# Patient Record
Sex: FEMALE | Race: BLACK OR AFRICAN AMERICAN | NOT HISPANIC OR LATINO | Employment: OTHER | URBAN - METROPOLITAN AREA
[De-identification: names, ages, dates, MRNs, and addresses within clinical notes are randomized per-mention and may not be internally consistent; named-entity substitution may affect disease eponyms.]

---

## 2023-05-10 ENCOUNTER — TELEPHONE (OUTPATIENT)
Dept: FAMILY MEDICINE | Facility: CLINIC | Age: 63
End: 2023-05-10
Payer: COMMERCIAL

## 2023-05-11 ENCOUNTER — LAB VISIT (OUTPATIENT)
Dept: LAB | Facility: HOSPITAL | Age: 63
End: 2023-05-11
Attending: FAMILY MEDICINE
Payer: COMMERCIAL

## 2023-05-11 ENCOUNTER — OFFICE VISIT (OUTPATIENT)
Dept: FAMILY MEDICINE | Facility: CLINIC | Age: 63
End: 2023-05-11
Payer: COMMERCIAL

## 2023-05-11 VITALS
OXYGEN SATURATION: 95 % | SYSTOLIC BLOOD PRESSURE: 136 MMHG | TEMPERATURE: 98 F | DIASTOLIC BLOOD PRESSURE: 80 MMHG | RESPIRATION RATE: 18 BRPM | HEART RATE: 82 BPM | BODY MASS INDEX: 33.72 KG/M2 | HEIGHT: 65 IN | WEIGHT: 202.38 LBS

## 2023-05-11 DIAGNOSIS — Z00.00 WELL WOMAN EXAM WITHOUT GYNECOLOGICAL EXAM: ICD-10-CM

## 2023-05-11 DIAGNOSIS — Z12.11 ENCOUNTER FOR SCREENING COLONOSCOPY: ICD-10-CM

## 2023-05-11 DIAGNOSIS — Z01.419 WELL WOMAN EXAM WITH ROUTINE GYNECOLOGICAL EXAM: ICD-10-CM

## 2023-05-11 DIAGNOSIS — E11.9 NEW ONSET TYPE 2 DIABETES MELLITUS: ICD-10-CM

## 2023-05-11 DIAGNOSIS — Z12.31 SCREENING MAMMOGRAM FOR BREAST CANCER: ICD-10-CM

## 2023-05-11 DIAGNOSIS — Z00.00 WELL WOMAN EXAM WITHOUT GYNECOLOGICAL EXAM: Primary | ICD-10-CM

## 2023-05-11 DIAGNOSIS — Z23 NEED FOR TDAP VACCINATION: ICD-10-CM

## 2023-05-11 LAB
ALBUMIN SERPL BCP-MCNC: 2.9 G/DL (ref 3.5–5.2)
ALP SERPL-CCNC: 73 U/L (ref 55–135)
ALT SERPL W/O P-5'-P-CCNC: 17 U/L (ref 10–44)
ANION GAP SERPL CALC-SCNC: 6 MMOL/L (ref 8–16)
AST SERPL-CCNC: 21 U/L (ref 10–40)
BASOPHILS # BLD AUTO: 0.01 K/UL (ref 0–0.2)
BASOPHILS NFR BLD: 0.2 % (ref 0–1.9)
BILIRUB SERPL-MCNC: 0.5 MG/DL (ref 0.1–1)
BUN SERPL-MCNC: 18 MG/DL (ref 8–23)
CALCIUM SERPL-MCNC: 8.7 MG/DL (ref 8.7–10.5)
CHLORIDE SERPL-SCNC: 102 MMOL/L (ref 95–110)
CHOLEST SERPL-MCNC: 259 MG/DL (ref 120–199)
CHOLEST/HDLC SERPL: 2.7 {RATIO} (ref 2–5)
CO2 SERPL-SCNC: 28 MMOL/L (ref 23–29)
CREAT SERPL-MCNC: 0.9 MG/DL (ref 0.5–1.4)
DIFFERENTIAL METHOD: ABNORMAL
EOSINOPHIL # BLD AUTO: 0.1 K/UL (ref 0–0.5)
EOSINOPHIL NFR BLD: 2.3 % (ref 0–8)
ERYTHROCYTE [DISTWIDTH] IN BLOOD BY AUTOMATED COUNT: 12.6 % (ref 11.5–14.5)
EST. GFR  (NO RACE VARIABLE): >60 ML/MIN/1.73 M^2
ESTIMATED AVG GLUCOSE: 292 MG/DL (ref 68–131)
GLUCOSE SERPL-MCNC: 263 MG/DL (ref 70–110)
HBA1C MFR BLD: 11.8 % (ref 4–5.6)
HCT VFR BLD AUTO: 37.4 % (ref 37–48.5)
HCV AB SERPL QL IA: NORMAL
HDLC SERPL-MCNC: 95 MG/DL (ref 40–75)
HDLC SERPL: 36.7 % (ref 20–50)
HGB BLD-MCNC: 11.7 G/DL (ref 12–16)
IMM GRANULOCYTES # BLD AUTO: 0.01 K/UL (ref 0–0.04)
IMM GRANULOCYTES NFR BLD AUTO: 0.2 % (ref 0–0.5)
LDLC SERPL CALC-MCNC: 149 MG/DL (ref 63–159)
LYMPHOCYTES # BLD AUTO: 1.4 K/UL (ref 1–4.8)
LYMPHOCYTES NFR BLD: 27.3 % (ref 18–48)
MCH RBC QN AUTO: 29.8 PG (ref 27–31)
MCHC RBC AUTO-ENTMCNC: 31.3 G/DL (ref 32–36)
MCV RBC AUTO: 95 FL (ref 82–98)
MONOCYTES # BLD AUTO: 0.2 K/UL (ref 0.3–1)
MONOCYTES NFR BLD: 3.5 % (ref 4–15)
NEUTROPHILS # BLD AUTO: 3.4 K/UL (ref 1.8–7.7)
NEUTROPHILS NFR BLD: 66.5 % (ref 38–73)
NONHDLC SERPL-MCNC: 164 MG/DL
NRBC BLD-RTO: 0 /100 WBC
PLATELET # BLD AUTO: 128 K/UL (ref 150–450)
PMV BLD AUTO: 13.2 FL (ref 9.2–12.9)
POTASSIUM SERPL-SCNC: 4.3 MMOL/L (ref 3.5–5.1)
PROT SERPL-MCNC: 6.2 G/DL (ref 6–8.4)
RBC # BLD AUTO: 3.93 M/UL (ref 4–5.4)
SODIUM SERPL-SCNC: 136 MMOL/L (ref 136–145)
TRIGL SERPL-MCNC: 75 MG/DL (ref 30–150)
TSH SERPL DL<=0.005 MIU/L-ACNC: 1.19 UIU/ML (ref 0.4–4)
WBC # BLD AUTO: 5.13 K/UL (ref 3.9–12.7)

## 2023-05-11 PROCEDURE — 85025 COMPLETE CBC W/AUTO DIFF WBC: CPT | Performed by: FAMILY MEDICINE

## 2023-05-11 PROCEDURE — 90715 TDAP VACCINE 7 YRS/> IM: CPT | Mod: S$GLB,,, | Performed by: FAMILY MEDICINE

## 2023-05-11 PROCEDURE — 90715 TDAP VACCINE GREATER THAN OR EQUAL TO 7YO IM: ICD-10-PCS | Mod: S$GLB,,, | Performed by: FAMILY MEDICINE

## 2023-05-11 PROCEDURE — 99386 PREV VISIT NEW AGE 40-64: CPT | Mod: 25,S$GLB,, | Performed by: FAMILY MEDICINE

## 2023-05-11 PROCEDURE — 99999 PR PBB SHADOW E&M-EST. PATIENT-LVL V: CPT | Mod: PBBFAC,,, | Performed by: FAMILY MEDICINE

## 2023-05-11 PROCEDURE — 1160F RVW MEDS BY RX/DR IN RCRD: CPT | Mod: CPTII,S$GLB,, | Performed by: FAMILY MEDICINE

## 2023-05-11 PROCEDURE — 3008F BODY MASS INDEX DOCD: CPT | Mod: CPTII,S$GLB,, | Performed by: FAMILY MEDICINE

## 2023-05-11 PROCEDURE — 90471 IMMUNIZATION ADMIN: CPT | Mod: S$GLB,,, | Performed by: FAMILY MEDICINE

## 2023-05-11 PROCEDURE — 86803 HEPATITIS C AB TEST: CPT | Performed by: FAMILY MEDICINE

## 2023-05-11 PROCEDURE — 90471 TDAP VACCINE GREATER THAN OR EQUAL TO 7YO IM: ICD-10-PCS | Mod: S$GLB,,, | Performed by: FAMILY MEDICINE

## 2023-05-11 PROCEDURE — 3079F PR MOST RECENT DIASTOLIC BLOOD PRESSURE 80-89 MM HG: ICD-10-PCS | Mod: CPTII,S$GLB,, | Performed by: FAMILY MEDICINE

## 2023-05-11 PROCEDURE — 3075F PR MOST RECENT SYSTOLIC BLOOD PRESS GE 130-139MM HG: ICD-10-PCS | Mod: CPTII,S$GLB,, | Performed by: FAMILY MEDICINE

## 2023-05-11 PROCEDURE — 3075F SYST BP GE 130 - 139MM HG: CPT | Mod: CPTII,S$GLB,, | Performed by: FAMILY MEDICINE

## 2023-05-11 PROCEDURE — 1160F PR REVIEW ALL MEDS BY PRESCRIBER/CLIN PHARMACIST DOCUMENTED: ICD-10-PCS | Mod: CPTII,S$GLB,, | Performed by: FAMILY MEDICINE

## 2023-05-11 PROCEDURE — 84443 ASSAY THYROID STIM HORMONE: CPT | Performed by: FAMILY MEDICINE

## 2023-05-11 PROCEDURE — 99386 PR PREVENTIVE VISIT,NEW,40-64: ICD-10-PCS | Mod: 25,S$GLB,, | Performed by: FAMILY MEDICINE

## 2023-05-11 PROCEDURE — 99999 PR PBB SHADOW E&M-EST. PATIENT-LVL V: ICD-10-PCS | Mod: PBBFAC,,, | Performed by: FAMILY MEDICINE

## 2023-05-11 PROCEDURE — 80061 LIPID PANEL: CPT | Performed by: FAMILY MEDICINE

## 2023-05-11 PROCEDURE — 3079F DIAST BP 80-89 MM HG: CPT | Mod: CPTII,S$GLB,, | Performed by: FAMILY MEDICINE

## 2023-05-11 PROCEDURE — 3008F PR BODY MASS INDEX (BMI) DOCUMENTED: ICD-10-PCS | Mod: CPTII,S$GLB,, | Performed by: FAMILY MEDICINE

## 2023-05-11 PROCEDURE — 83036 HEMOGLOBIN GLYCOSYLATED A1C: CPT | Performed by: FAMILY MEDICINE

## 2023-05-11 PROCEDURE — 36415 COLL VENOUS BLD VENIPUNCTURE: CPT | Mod: PO | Performed by: FAMILY MEDICINE

## 2023-05-11 PROCEDURE — 1159F PR MEDICATION LIST DOCUMENTED IN MEDICAL RECORD: ICD-10-PCS | Mod: CPTII,S$GLB,, | Performed by: FAMILY MEDICINE

## 2023-05-11 PROCEDURE — 80053 COMPREHEN METABOLIC PANEL: CPT | Performed by: FAMILY MEDICINE

## 2023-05-11 PROCEDURE — 1159F MED LIST DOCD IN RCRD: CPT | Mod: CPTII,S$GLB,, | Performed by: FAMILY MEDICINE

## 2023-05-11 RX ORDER — ZOLPIDEM TARTRATE 10 MG/1
TABLET ORAL
COMMUNITY
Start: 2022-10-28 | End: 2024-02-15 | Stop reason: SDUPTHER

## 2023-05-11 RX ORDER — ALPRAZOLAM 0.25 MG/1
0.25 TABLET ORAL DAILY PRN
COMMUNITY
Start: 2023-05-05

## 2023-05-11 RX ORDER — LANCETS
EACH MISCELLANEOUS
Qty: 100 EACH | Refills: 2 | Status: SHIPPED | OUTPATIENT
Start: 2023-05-11

## 2023-05-11 RX ORDER — INSULIN PUMP SYRINGE, 3 ML
EACH MISCELLANEOUS
Qty: 1 EACH | Refills: 0 | Status: SHIPPED | OUTPATIENT
Start: 2023-05-11 | End: 2024-05-10

## 2023-05-11 RX ORDER — GLYBURIDE 5 MG/1
5 TABLET ORAL
Qty: 90 TABLET | Refills: 0 | Status: SHIPPED | OUTPATIENT
Start: 2023-05-11 | End: 2023-07-19

## 2023-05-11 RX ORDER — ESCITALOPRAM OXALATE 20 MG/1
TABLET ORAL
COMMUNITY
Start: 2022-11-10 | End: 2024-02-15

## 2023-05-11 NOTE — PROGRESS NOTES
"Well Woman VISIT      CHIEF COMPLAINT  Chief Complaint   Patient presents with    [facility]       HPI  Machelle Morin is a 63 y.o. female who presents for wellness.     Social Factors  Tobacco use: No  Ready to Quit: No  Alcohol: Yes on occasion  Intimate partner violence screening  "Do you feel safe in your current relationship?"   "Have you ever been in a relationship in which your partner frightened you or hurt you?" No  Living Will/POA: No  Regular Exercise: No    Depression  Over the past two weeks, have you felt down, depressed, or hopeless? No  Over the past two weeks, have you felt little interest or pleasure in doing things? No    Reproductive Health  Dr. Awad    CHD, HTN, DM2  CHD Risk Factors: diabetes mellitus and obesity (BMI >= 30 kg/m2)  Women 45 years and older should be screened for dyslipidemia if at increased risk of CHD  Women 20 to 45 years of age should be screened for dyslipidemia if at increased risk of CHD  Asymptomatic adults with sustained blood pressure greater than 135/80 mm Hg (treated or untreated) should be screened for type 2 diabetes mellitus    Estimated body mass index is 33.68 kg/m² as calculated from the following:    Height as of this encounter: 5' 5" (1.651 m).    Weight as of this encounter: 91.8 kg (202 lb 6.1 oz).      Screening  Mammogram needed: order today  Colonoscopy needed: oredered  Osteoporosis screen needed: n/a     Women 50 to 74 years of age should be screened for breast cancer with mammography biennially.  Women should be screened for cervical cancer with Pap tests beginning at 21 years of age. Low-risk women should receive Pap testing every three years. Co-testing for human papillomavirus is an option beginning at 30 years of age, and can extend the screening interval to five years. Cervical cancer screening should be discontinued at 65 years of age or after total hysterectomy if the woman has a benign gynecologic history  Adults 50 to " "75 years of age should be screened for colorectal cancer with an FOBT annually, sigmoidoscopy every five years with an FOBT every three years, or colonoscopy every 10 years.  Women 65 years and older should be screened for osteoporosis. Women younger than 65 years should be screened if the risk of fracture is greater than or equal to that of a 65-year-old white woman without additional risk factors.      Immunizations  up to date and documented    ALLERGIES and MEDS were verified.   PMHx, PSHx, FHx, SOCIALHx were updated as pertinent.    REVIEW OF SYSTEMS  Review of Systems   Constitutional: Negative.    HENT: Negative.     Eyes:  Positive for blurred vision.   Respiratory: Negative.     Cardiovascular: Negative.    Gastrointestinal: Negative.    Genitourinary: Negative.    Musculoskeletal:  Positive for joint pain. Negative for myalgias.   Skin: Negative.        PHYSICAL EXAM  VITAL SIGNS: /80   Pulse 82   Temp 98.3 °F (36.8 °C) (Oral)   Resp 18   Ht 5' 5" (1.651 m)   Wt 91.8 kg (202 lb 6.1 oz)   SpO2 95%   BMI 33.68 kg/m²   GEN: Well developed, Well nourished, No acute distress.  HENT: Normocephalic, Atraumatic, Bilateral external ears normal, Nose normal, Oropharynx moist, No oral exudates.   Eyes: PERRL, EOMI, Conjunctiva normal, No discharge.   Neck: Supple, No tenderness.  Lymphatic: No cervical or supraclavicular lymphadenopathy noted.   Cardiovascular: Normal heart rate, Normal rhythm, No murmurs, No rubs, No gallops.   Thorax & Lungs: Normal breath sounds, No respiratory distress, No wheezing.  Abdomen: Soft, No tenderness, Bowel sounds normal.   Skin: Warm, Dry, No erythema, No rash.   Extremities: No edema, No tenderness.       ASSESSMENT/PLAN    Machelle was seen today for establish care.    Diagnoses and all orders for this visit:    Well woman exam without gynecological exam  -     CBC Auto Differential; Future  -     Comprehensive Metabolic Panel; Future  -     Lipid Panel; Future  -     " Urinalysis; Future  -     Hemoglobin A1C; Future  -     Hepatitis C Antibody; Future  -     TSH; Future    Screening mammogram for breast cancer  -     Mammo Digital Screening Bilat w/ Jesus; Future    Encounter for screening colonoscopy  -     Ambulatory referral/consult to Endo Procedure ; Future    Need for Tdap vaccination  -     (In Office Administered) Tdap Vaccine    New onset type 2 diabetes mellitus  -     blood-glucose meter kit; To check BG 2 times daily, to use with insurance preferred meter  -     lancets Misc; To check BG 2 times daily, to use with insurance preferred meter  -     blood sugar diagnostic Strp; To check BG 2 times daily, to use with insurance preferred meter  -     glyBURIDE (DIABETA) 5 MG tablet; Take 1 tablet (5 mg total) by mouth daily with breakfast.    Well woman exam with routine gynecological exam  -     Ambulatory referral/consult to Obstetrics / Gynecology; Future         FOLLOW UP: 3 months or sooner if needed  Will try on glyburide as a1c done by andres was 12 and she doesn't want metformin due to effects it had on her husbands kidneys  Testing supplies ordered  Once blood sugars come down some will look at trying on GLP1  Tdap given  Will add ARB and statin once I have a chance to see how medications effect her and to prevent confusion as to what medication would cause any potential side effect        MD Casper Urbano MD

## 2023-05-11 NOTE — PROGRESS NOTES
Patient given tdap vaccine via injection. 0 complaints of, tolerated well. VIS given & advised to wait in lobby 15 mins for monitoring. Verbalized understanding.

## 2023-05-12 ENCOUNTER — TELEPHONE (OUTPATIENT)
Dept: FAMILY MEDICINE | Facility: CLINIC | Age: 63
End: 2023-05-12
Payer: COMMERCIAL

## 2023-05-12 ENCOUNTER — TELEPHONE (OUTPATIENT)
Dept: OBSTETRICS AND GYNECOLOGY | Facility: CLINIC | Age: 63
End: 2023-05-12
Payer: COMMERCIAL

## 2023-05-12 NOTE — TELEPHONE ENCOUNTER
----- Message from Elise Henry MA sent at 5/12/2023  2:34 PM CDT -----  Regarding: referral  Patient has a referral in for Gynecology and is requesting to see Dr Awad  or [facility] can you please assist with scheduling.                      Thanks In Advance

## 2023-05-12 NOTE — TELEPHONE ENCOUNTER
----- Message from Reji Hicks sent at 5/12/2023 12:15 PM CDT -----  Regarding: Returning Call  .Type:  Patient Returning Call    Who Called: Self     Who Left Message for Patient: Renae    Does the patient know what this is regarding?: No    Would the patient rather a call back or a response via [facility]? Call back    Best Call Back Number: [phone]     Additional Information:

## 2023-05-12 NOTE — TELEPHONE ENCOUNTER
----- Message from Casper Saucedo MD sent at 5/12/2023  9:53 AM CDT -----  Please let patient know that her labs are back and her blood sugars are definitely too high as is her cholesterol.  Please check to see if she started her new medication for diabetes and checked her blood sugars.    Thanks,  Dr. Saucedo

## 2023-05-12 NOTE — TELEPHONE ENCOUNTER
Called patient and scheduled new patient appointment for 07/25/23 at 9:00 am. Patient said thanks.

## 2023-05-12 NOTE — PROGRESS NOTES
Please let patient know that her labs are back and her blood sugars are definitely too high as is her cholesterol.  Please check to see if she started her new medication for diabetes and checked her blood sugars.    Thanks,  Dr. Saucedo

## 2023-05-12 NOTE — TELEPHONE ENCOUNTER
Below information given to patient, verbalized   understanding . States the pharmacy was out, will  and start right away, will monitor blood sugars and call on Monday with update

## 2023-05-17 ENCOUNTER — TELEPHONE (OUTPATIENT)
Dept: FAMILY MEDICINE | Facility: CLINIC | Age: 63
End: 2023-05-17
Payer: COMMERCIAL

## 2023-05-17 ENCOUNTER — TELEPHONE (OUTPATIENT)
Dept: OBSTETRICS AND GYNECOLOGY | Facility: CLINIC | Age: 63
End: 2023-05-17
Payer: COMMERCIAL

## 2023-05-17 NOTE — TELEPHONE ENCOUNTER
----- Message from Travis Posey MA sent at 5/12/2023  2:46 PM CDT -----  Regarding: FW: referral    ----- Message -----  From: Elise Henry MA  Sent: 5/12/2023   2:36 PM CDT  To: [facility] ANN Staff, #  Subject: referral                                         Patient has a referral in for Gynecology and is requesting to see Dr Awad  or Guerrero can you please assist with scheduling.                      Thanks In Advance

## 2023-06-02 ENCOUNTER — PATIENT MESSAGE (OUTPATIENT)
Dept: FAMILY MEDICINE | Facility: CLINIC | Age: 63
End: 2023-06-02
Payer: COMMERCIAL

## 2023-06-03 ENCOUNTER — PATIENT MESSAGE (OUTPATIENT)
Dept: FAMILY MEDICINE | Facility: CLINIC | Age: 63
End: 2023-06-03
Payer: COMMERCIAL

## 2023-06-03 DIAGNOSIS — E78.49 OTHER HYPERLIPIDEMIA: ICD-10-CM

## 2023-06-03 DIAGNOSIS — E66.09 CLASS 1 OBESITY DUE TO EXCESS CALORIES WITH SERIOUS COMORBIDITY AND BODY MASS INDEX (BMI) OF 33.0 TO 33.9 IN ADULT: ICD-10-CM

## 2023-06-03 DIAGNOSIS — E11.65 TYPE 2 DIABETES MELLITUS WITH HYPERGLYCEMIA, WITHOUT LONG-TERM CURRENT USE OF INSULIN: ICD-10-CM

## 2023-06-06 PROBLEM — E78.49 OTHER HYPERLIPIDEMIA: Status: ACTIVE | Noted: 2023-06-06

## 2023-06-06 PROBLEM — E66.811 CLASS 1 OBESITY DUE TO EXCESS CALORIES WITH SERIOUS COMORBIDITY AND BODY MASS INDEX (BMI) OF 33.0 TO 33.9 IN ADULT: Status: ACTIVE | Noted: 2023-06-06

## 2023-06-06 PROBLEM — E11.65 TYPE 2 DIABETES MELLITUS WITH HYPERGLYCEMIA, WITHOUT LONG-TERM CURRENT USE OF INSULIN: Status: ACTIVE | Noted: 2023-06-06

## 2023-06-06 PROBLEM — E66.09 CLASS 1 OBESITY DUE TO EXCESS CALORIES WITH SERIOUS COMORBIDITY AND BODY MASS INDEX (BMI) OF 33.0 TO 33.9 IN ADULT: Status: ACTIVE | Noted: 2023-06-06

## 2023-07-11 ENCOUNTER — HOSPITAL ENCOUNTER (OUTPATIENT)
Dept: RADIOLOGY | Facility: HOSPITAL | Age: 63
Discharge: HOME | End: 2023-07-11
Attending: FAMILY MEDICINE
Payer: COMMERCIAL

## 2023-07-11 DIAGNOSIS — Z12.31 SCREENING MAMMOGRAM FOR BREAST CANCER: ICD-10-CM

## 2023-07-11 PROCEDURE — 77067 MAMMO DIGITAL SCREENING BILAT WITH TOMO: ICD-10-PCS | Mod: 26,,, | Performed by: RADIOLOGY

## 2023-07-11 PROCEDURE — 77063 BREAST TOMOSYNTHESIS BI: CPT | Mod: 26,,, | Performed by: RADIOLOGY

## 2023-07-11 PROCEDURE — 77067 SCR MAMMO BI INCL CAD: CPT | Mod: 26,,, | Performed by: RADIOLOGY

## 2023-07-11 PROCEDURE — 77067 SCR MAMMO BI INCL CAD: CPT | Mod: TC,PO

## 2023-07-11 PROCEDURE — 77063 MAMMO DIGITAL SCREENING BILAT WITH TOMO: ICD-10-PCS | Mod: 26,,, | Performed by: RADIOLOGY

## 2023-07-19 ENCOUNTER — OFFICE VISIT (OUTPATIENT)
Dept: ENDOCRINOLOGY | Facility: CLINIC | Age: 63
End: 2023-07-19
Payer: COMMERCIAL

## 2023-07-19 ENCOUNTER — LAB VISIT (OUTPATIENT)
Dept: LAB | Facility: HOSPITAL | Age: 63
End: 2023-07-19
Attending: NURSE PRACTITIONER
Payer: COMMERCIAL

## 2023-07-19 VITALS
SYSTOLIC BLOOD PRESSURE: 144 MMHG | HEART RATE: 76 BPM | BODY MASS INDEX: 31.62 KG/M2 | TEMPERATURE: 98 F | WEIGHT: 190 LBS | DIASTOLIC BLOOD PRESSURE: 76 MMHG

## 2023-07-19 DIAGNOSIS — E11.65 TYPE 2 DIABETES MELLITUS WITH HYPERGLYCEMIA, WITHOUT LONG-TERM CURRENT USE OF INSULIN: ICD-10-CM

## 2023-07-19 DIAGNOSIS — E66.9 NON MORBID OBESITY, UNSPECIFIED OBESITY TYPE: ICD-10-CM

## 2023-07-19 DIAGNOSIS — E11.65 TYPE 2 DIABETES MELLITUS WITH HYPERGLYCEMIA, WITHOUT LONG-TERM CURRENT USE OF INSULIN: Primary | ICD-10-CM

## 2023-07-19 LAB
ESTIMATED AVG GLUCOSE: 192 MG/DL (ref 68–131)
HBA1C MFR BLD: 8.3 % (ref 4–5.6)

## 2023-07-19 PROCEDURE — 1160F PR REVIEW ALL MEDS BY PRESCRIBER/CLIN PHARMACIST DOCUMENTED: ICD-10-PCS | Mod: CPTII,S$GLB,, | Performed by: NURSE PRACTITIONER

## 2023-07-19 PROCEDURE — 99999 PR PBB SHADOW E&M-EST. PATIENT-LVL IV: CPT | Mod: PBBFAC,,, | Performed by: NURSE PRACTITIONER

## 2023-07-19 PROCEDURE — 1160F RVW MEDS BY RX/DR IN RCRD: CPT | Mod: CPTII,S$GLB,, | Performed by: NURSE PRACTITIONER

## 2023-07-19 PROCEDURE — 3046F PR MOST RECENT HEMOGLOBIN A1C LEVEL > 9.0%: ICD-10-PCS | Mod: CPTII,S$GLB,, | Performed by: NURSE PRACTITIONER

## 2023-07-19 PROCEDURE — 3008F BODY MASS INDEX DOCD: CPT | Mod: CPTII,S$GLB,, | Performed by: NURSE PRACTITIONER

## 2023-07-19 PROCEDURE — 1159F PR MEDICATION LIST DOCUMENTED IN MEDICAL RECORD: ICD-10-PCS | Mod: CPTII,S$GLB,, | Performed by: NURSE PRACTITIONER

## 2023-07-19 PROCEDURE — 99204 PR OFFICE/OUTPT VISIT, NEW, LEVL IV, 45-59 MIN: ICD-10-PCS | Mod: S$GLB,,, | Performed by: NURSE PRACTITIONER

## 2023-07-19 PROCEDURE — 36415 COLL VENOUS BLD VENIPUNCTURE: CPT | Performed by: NURSE PRACTITIONER

## 2023-07-19 PROCEDURE — 99999 PR PBB SHADOW E&M-EST. PATIENT-LVL IV: ICD-10-PCS | Mod: PBBFAC,,, | Performed by: NURSE PRACTITIONER

## 2023-07-19 PROCEDURE — 99204 OFFICE O/P NEW MOD 45 MIN: CPT | Mod: S$GLB,,, | Performed by: NURSE PRACTITIONER

## 2023-07-19 PROCEDURE — 3078F DIAST BP <80 MM HG: CPT | Mod: CPTII,S$GLB,, | Performed by: NURSE PRACTITIONER

## 2023-07-19 PROCEDURE — 3046F HEMOGLOBIN A1C LEVEL >9.0%: CPT | Mod: CPTII,S$GLB,, | Performed by: NURSE PRACTITIONER

## 2023-07-19 PROCEDURE — 3077F PR MOST RECENT SYSTOLIC BLOOD PRESSURE >= 140 MM HG: ICD-10-PCS | Mod: CPTII,S$GLB,, | Performed by: NURSE PRACTITIONER

## 2023-07-19 PROCEDURE — 83036 HEMOGLOBIN GLYCOSYLATED A1C: CPT | Performed by: NURSE PRACTITIONER

## 2023-07-19 PROCEDURE — 1159F MED LIST DOCD IN RCRD: CPT | Mod: CPTII,S$GLB,, | Performed by: NURSE PRACTITIONER

## 2023-07-19 PROCEDURE — 3077F SYST BP >= 140 MM HG: CPT | Mod: CPTII,S$GLB,, | Performed by: NURSE PRACTITIONER

## 2023-07-19 PROCEDURE — 3008F PR BODY MASS INDEX (BMI) DOCUMENTED: ICD-10-PCS | Mod: CPTII,S$GLB,, | Performed by: NURSE PRACTITIONER

## 2023-07-19 PROCEDURE — 3078F PR MOST RECENT DIASTOLIC BLOOD PRESSURE < 80 MM HG: ICD-10-PCS | Mod: CPTII,S$GLB,, | Performed by: NURSE PRACTITIONER

## 2023-07-19 RX ORDER — DULAGLUTIDE 0.75 MG/.5ML
0.75 INJECTION, SOLUTION SUBCUTANEOUS
Qty: 4 PEN | Refills: 3 | Status: SHIPPED | OUTPATIENT
Start: 2023-07-19 | End: 2023-10-10 | Stop reason: SDUPTHER

## 2023-07-19 NOTE — PATIENT INSTRUCTIONS
A1C goal: <7%  Fasting/premeal blood glucose goal:   2 hour post-meal blood glucose goal: less than 180    Increase intensity of physical activity as tolerated - discussed dumbbells and brisk walking.   Cut back on coffee for both glycemic and blood pressure.   See Diabetes Education for comprehensive education and injection training.     Discussed oral medication options including metformin (not nephrotoxic). Pt definitely does not want metformin. Interested in Trulicity because her SANDHYA does well on it.     Start Trulicity 0.75 mg once weekly. Potential side effects: nausea, diarrhea, constipation, bloating. Nausea for the first week or two is common.  Avoid big food portions and greasy/heavy foods.     Stop glyburide when Trulicity is started.     Test glucose 1-2x/day. Declines personal CGM today.     Return to clinic in 3 months with a1c prior. Virtual   A1c today.

## 2023-07-19 NOTE — PROGRESS NOTES
CC: This 63 y.o. Black or  female  is here for evaluation of  T2DM along with comorbidities indicated in the Visit Diagnosis section of this encounter.    HPI: Machelle Morin was diagnosed with T2DM in May 2023. Glyburide started at the time of diagnosis.     DM COMPLICATIONS: none    New to Endocrine. Referred by Dr. Saucedo her PCP.   Pt has lost 12 lb since May when she was dx'd with DM. Has cut down significantly on her carb intake.   Pt had messaged Dr. Saucedo that she did not want weekly injection. However, she does not recall this message and she actually is interested in Trulicity as her  brother-in-law has done well on it.   Does not want metformin as she believes it damaged her 's kidneys.       LAST DIABETES EDUCATION: never     HOSPITALIZED FOR DIABETES OR RELATED COMPLICATIONS -  No.    SIGNIFICANT DIABETES MED HISTORY: n/a     PRESCRIBED DIABETES MEDICATIONS:   Glyburide 5 mg once daily before breakfast     Misses medication doses - No        SELF MONITORING BLOOD GLUCOSE: Monitors blood glucose at home 1x/day. Brings logs.           HYPOGLYCEMIC EPISODES: lowest BG was 83, afternoon, likely skipped lunch that day. Felt very weak.   Corrects with peppermint.      CURRENT DIET: drinks coffee with creamer and Truvia - 2 tumblers per day; drinks water and occ diet sprite.   Breakfast - 1 slice of toast; tries to eat breakfast to get 3 meals in per day but doesn't care for it.   Lunch - salad.   Dinner - veggies and protein   Snacks - almonds or pecans. (Switched from chips)       CURRENT EXERCISE: walks in neighborhood 1/2 hour daily.     SOCIAL:   had DM       BP (!) 163/77   Pulse 76   Temp 97.6 °F (36.4 °C)   Wt 86.2 kg (190 lb)   BMI 31.62 kg/m²     ROS:   CONSTITUTIONAL: Appetite good, denies fatigue   EYES: +  visual disturbances - recent   : No urinary frequency  OTHER: no polydipsia     PHYSICAL EXAM:  GENERAL: Well developed, well nourished. No acute  distress.   PSYCH: AAOx3, appropriate mood and affect, conversant, well-groomed. Judgement and insight good.   NEURO: Cranial nerves grossly intact. Speech clear.   CHEST: Respirations even and unlabored.   SKIN: no acanthosis nigricans.        Hemoglobin A1C   Date Value Ref Range Status   05/11/2023 11.8 (H) 4.0 - 5.6 % Final     Comment:     ADA Screening Guidelines:  5.7-6.4%  Consistent with prediabetes  >or=6.5%  Consistent with diabetes    High levels of fetal hemoglobin interfere with the HbA1C  assay. Heterozygous hemoglobin variants (HbS, HgC, etc)do  not significantly interfere with this assay.   However, presence of multiple variants may affect accuracy.         No results found for: CPEPTIDE, GLUTAMICACID, ISLETCELLANT, FRUCTOSAMINE     Lab Results   Component Value Date    CHOL 259 (H) 05/11/2023     Lab Results   Component Value Date    HDL 95 (H) 05/11/2023     Lab Results   Component Value Date    LDLCALC 149.0 05/11/2023     Lab Results   Component Value Date    TRIG 75 05/11/2023     Lab Results   Component Value Date    CHOLHDL 36.7 05/11/2023         Chemistry        Component Value Date/Time     05/11/2023 0920    K 4.3 05/11/2023 0920     05/11/2023 0920    CO2 28 05/11/2023 0920    BUN 18 05/11/2023 0920    CREATININE 0.9 05/11/2023 0920     (H) 05/11/2023 0920        Component Value Date/Time    CALCIUM 8.7 05/11/2023 0920    ALKPHOS 73 05/11/2023 0920    AST 21 05/11/2023 0920    ALT 17 05/11/2023 0920    BILITOT 0.5 05/11/2023 0920           Latest Reference Range & Units Most Recent   BUN 8 - 23 mg/dL 18  5/11/23 09:20   Creatinine 0.5 - 1.4 mg/dL 0.9  5/11/23 09:20   eGFR >60 mL/min/1.73 m^2 >60.0  5/11/23 09:20       No results found for: LABMICR, CREATRANDUR, MICALBCREAT          ASSESSMENT and PLAN:    A1C GOAL: < 7 %     1. Type 2 diabetes mellitus with hyperglycemia, without long-term current use of insulin  Discussed progression of DM disease, long term  complications, and tx options. Reviewed A1c and BG goals.     Increase intensity of physical activity as tolerated - discussed dumbbells and brisk walking.   Cut back on coffee for both glycemic and blood pressure.   See Diabetes Education for comprehensive education and injection training.     Discussed oral medication options including metformin (not nephrotoxic, risk of lactic acidosis). Pt definitely does not want metformin.     Start Trulicity 0.75 mg once weekly. Potential side effects: nausea, diarrhea, constipation, bloating. Nausea for the first week or two is common.  Avoid big food portions and greasy/heavy foods.     Stop glyburide when Trulicity is started.     Test glucose 1-2x/day. Declines personal CGM today.     Return to clinic in 3 months with a1c prior.   A1c today.     Ambulatory referral/consult to Endocrinology    Hemoglobin A1C    Ambulatory referral/consult to Diabetes Education      2. Non morbid obesity, unspecified obesity type  Weight loss noted.           Orders Placed This Encounter   Procedures    Hemoglobin A1C     Standing Status:   Standing     Number of Occurrences:   2     Standing Expiration Date:   9/16/2024    Ambulatory referral/consult to Diabetes Education     Standing Status:   Future     Standing Expiration Date:   7/19/2024     Referral Priority:   Routine     Referral Type:   Consultation     Referral Reason:   Specialty Services Required     Requested Specialty:   Endocrinology     Number of Visits Requested:   1     Expiration Date:   7/19/2024        Follow up in about 3 months (around 10/19/2023) for Virtual Visit.     Thank you very much for allowing me to participate in Machelle Morin's care.

## 2023-07-24 ENCOUNTER — PATIENT MESSAGE (OUTPATIENT)
Dept: RESEARCH | Facility: HOSPITAL | Age: 63
End: 2023-07-24
Payer: COMMERCIAL

## 2023-07-26 DIAGNOSIS — E11.9 TYPE 2 DIABETES MELLITUS WITHOUT COMPLICATION: ICD-10-CM

## 2023-07-26 DIAGNOSIS — E11.9 TYPE 2 DIABETES MELLITUS WITHOUT COMPLICATION, UNSPECIFIED WHETHER LONG TERM INSULIN USE: ICD-10-CM

## 2023-07-27 ENCOUNTER — CLINICAL SUPPORT (OUTPATIENT)
Dept: DIABETES | Facility: CLINIC | Age: 63
End: 2023-07-27
Payer: COMMERCIAL

## 2023-07-27 VITALS — HEIGHT: 65 IN | BODY MASS INDEX: 31.63 KG/M2 | WEIGHT: 189.81 LBS

## 2023-07-27 DIAGNOSIS — E11.65 TYPE 2 DIABETES MELLITUS WITH HYPERGLYCEMIA, WITHOUT LONG-TERM CURRENT USE OF INSULIN: ICD-10-CM

## 2023-07-27 PROCEDURE — G0108 DIAB MANAGE TRN  PER INDIV: HCPCS | Mod: S$GLB,,, | Performed by: FAMILY MEDICINE

## 2023-07-27 PROCEDURE — G0108 PR DIAB MANAGE TRN  PER INDIV: ICD-10-PCS | Mod: S$GLB,,, | Performed by: FAMILY MEDICINE

## 2023-07-27 PROCEDURE — 99999 PR PBB SHADOW E&M-EST. PATIENT-LVL II: ICD-10-PCS | Mod: PBBFAC,,,

## 2023-07-27 PROCEDURE — 99999 PR PBB SHADOW E&M-EST. PATIENT-LVL II: CPT | Mod: PBBFAC,,,

## 2023-07-27 NOTE — PROGRESS NOTES
Diabetes Care Specialist Progress Note  Author: Sarah Reyna RN  Date: 7/27/2023    Program Intake  Reason for Diabetes Program Visit:: Initial Diabetes Assessment  Current diabetes risk level:: moderate  In the last 12 months, have you:: none  Permission to speak with others about care:: yes    Lab Results   Component Value Date    HGBA1C 8.3 (H) 07/19/2023       Clinical  Patient Health Rating  Compared to other people your age, how would you rate your health?: Good  Problem Review  Active comorbidities affecting diabetes self-care.: no  Clinical Assessment  Current Diabetes Treatment: Oral Medication, Injectable (trulicity 0.75mg weekly and Glyburide 5mg before breakfast)  Have you ever experienced hypoglycemia (low blood sugar)?: no  Have you ever experienced hyperglycemia (high blood sugar)?: no    Medication Information  How do you obtain your medications?: Patient drives  How many days a week do you miss your medications?: Never  Do you sometimes have difficulty refilling your medications?: No  Medication adherence impacting ability to self-manage diabetes?: No    Labs  Do you have regular lab work to monitor your medications?: Yes  Type of Regular Lab Work: A1c  Where do you get your labs drawn?: [facility]  Lab Compliance Barriers: No    Nutritional Status  Diet: Regular  Meal Plan 24 Hour Recall: Breakfast, Lunch, Dinner, Snack  Meal Plan 24 Hour Recall - Breakfast: 1 slice of wheat bread and coffee w/truvia and cream  Meal Plan 24 Hour Recall - Lunch: salad w/lettuce, tomatoes, carrots, and avocados + yogurt with water or zero 7up  Meal Plan 24 Hour Recall - Dinner: baked chicken and grilled vegetables (squash, zucchini, eggplant) and water  Meal Plan 24 Hour Recall - Snack: nuts (almonds and pecans)  Change in appetite?: Yes  Recent Changes in Weight: Weight Loss  Was weight loss intentional or unintentional?: Intentional  Current nutritional status an area of need that is impacting patient's ability to  self-manage diabetes?: No    Additional Social History  Support  Does anyone support you with your diabetes care?: yes  Who supports you?: self  Who takes you to your medical appointments?: self  Does the current support meet the patient's needs?: Yes  Is Support an area impacting ability to self-manage diabetes?: No    Access to Mass Media & Technology  Does the patient have access to any of the following devices or technologies?: Smart phone  Media or technology needs impacting ability to self-manage diabetes?: No    Cognitive/Behavioral Health  Alert and Oriented: Yes  Difficulty Thinking: No  Requires Prompting: No  Requires assistance for routine expression?: No  Cognitive or behavioral barriers impacting ability to self-manage diabetes?: No    Culture/Alevism  Culture or Gnosticism beliefs that may impact ability to access healthcare: No    Communication  Language preference: English  Hearing Problems: No  Vision Problems: Yes  Vision problem type:: Decreased Vision  Vision Assistance: Glasses  Communication needs impacting ability to self-manage diabetes?: No    Health Literacy  Preferred Learning Method: Face to Face, Reading Materials  How often do you need to have someone help you read instructions, pamphlets, or written material from your doctor or pharmacy?: Never  Health literacy needs impacting ability to self-manage diabetes?: No      Diabetes Self-Management Skills Assessment  Diabetes Disease Process/Treatment Options  Patient/caregiver able to state what happens when someone has diabetes.: no  Patient/caregiver knows what type of diabetes they have.: yes  Diabetes Type : Type II  Patient/caregiver able to identify at least three signs and symptoms of diabetes.: no  Patient able to identify at least three risk factors for diabetes.: no  Diabetes Disease Process/Treatment Options: Skills Assessment Completed: Yes  Assessment indicates:: Instruction Needed, Knowledge deficit  Area of need?:  Yes    Nutrition/Healthy Eating  Challenges to healthy eating:: portion control  Method of carbohydrate measurement:: no method  Patient can identify foods that impact blood sugar.: yes  Patient-identified foods:: soda, starches (bread, pasta, rice, cereal), sweets  Nutrition/Healthy Eating Skills Assessment Completed:: Yes  Assessment indicates:: Instruction Needed, Knowledge deficit  Area of need?: Yes    Physical Activity/Exercise  Physical Activity/Exercise Skills Assessment Completed: : No  Deffered due to:: Time    Medications  Patient is able to describe current diabetes management routine.: yes  Diabetes management routine:: oral medications, injectable medications  Patient is able to identify current diabetes medications, dosages, and appropriate timing of medications.: yes  Patient understands the purpose of the medications taken for diabetes.: no  Patient reports problems or concerns with current medication regimen.: no  Medication Skills Assessment Completed:: Yes  Assessment indicates:: Instruction Needed, Knowledge deficit  Area of need?: Yes    Home Blood Glucose Monitoring  Patient states that blood sugar is checked at home daily.: yes  Monitoring Method:: home glucometer  Home glucometer meter type:: Insurance Preferred Meter  How often do you check your blood sugar?: Once a day  When do you check your blood sugar?: Before breakfast  When you check what is your typical blood sugar range? : 127,157,187,198,215  Blood glucose logs:: no, encouraged to keep logs, encouraged to bring logs to provider visits  Blood glucose logs reviewed today?: no  Home Blood Glucose Monitoring Skills Assessment Completed: : Yes  Assessment indicates:: Instruction Needed, Knowledge deficit  Area of need?: Yes    Acute Complications  Patient is able to identify types of acute complications: Yes  Patient Identified:: Hypoglycemia  Patient is able to state the basic meaning of hypoglycemia?: No  Able to state the blood sugar  range for hypoglycemia?: yes  Patient stated range:: 70  Patient can identify general symptoms of hypoglycemia: yes  Patient identified:: dizziness  Able to state proper treatment of hypoglycemia?: yes  Patient identified:: 5-6 pieces of hard candy  Acute Complications Skills Assessment Completed: : Yes  Assessment indicates:: Instruction Needed, Knowledge deficit  Area of need?: Yes    Chronic Complications  Chronic Complications Skills Assessment Completed: : No  Deferred due to:: Time    Psychosocial/Coping  Psychosocial/Coping Skills Assessment Completed: : No  Deffered due to:: Time    Assessment Summary and Plan    Based on today's diabetes care assessment, the following areas of need were identified:      Social 7/27/2023   Support No   Access to Mass Media/Tech No   Cognitive/Behavioral Health No   Culture/Bahai No   Communication No   Health Literacy No        Clinical 7/27/2023   Medication Adherence No   Lab Compliance No   Nutritional Status No        Diabetes Self-Management Skills 7/27/2023   Diabetes Disease Process/Treatment Options Yes- Provided DM management guide and discussed the significance of current A1c and blood glucose goals.   Nutrition/Healthy Eating Yes- see care planning   Medication Yes- Reviewed Patient's current medication regimen. MOA reviewed including common side effects.   Home Blood Glucose Monitoring Yes- Patient encouraged to document glucose results and bring them to every clinic visit for review and maintenance of medication.   Acute Complications Yes- Reviewed blood sugar values, prevention, detection, signs and symptoms, and treatment of hypoglycemia following rule of 15. Advised to carry a source of fast acting carbs.           Today's interventions were provided through individual discussion, instruction, and written materials were provided.      Patient verbalized understanding of instruction and written materials.  Pt was able to return back demonstration of  instructions today. Patient understood key points, needs reinforcement and further instruction.     Diabetes Self-Management Care Plan:    Today's Diabetes Self-Management Care Plan was developed with Machelle's input. Macehlle has agreed to work toward the following goal(s) to improve his/her overall diabetes control.      Care Plan: Diabetes Management   Updates made since 6/27/2023 12:00 AM        Problem: Healthy Eating         Goal: Eat 2-3 meals daily with 30-45g/2-3 servings of Carbohydrate per meal. Limit snacking in between meal to 1 serving (15 grams).    Start Date: 7/27/2023   Expected End Date: 8/18/2023   Priority: High   Barriers: No Barriers Identified   Note:    7/27/23 - - We concentrated on portion sizes at today's session. Overall meal planning and various choices for meals. Discussed carb vs non-carb foods, appropriate amount of carbs to have at meals/snacks and acceptable serving sizes of individual carb items. Obtained a 24-hr food recall from patient. Discussed various meal plan options to promote healthy eating. Pt says since May, she's cut out snacking a lot and sweets like apple fritters etc.     - - Practiced reading food labels on various food items with patient focusing on serving size and total carbohydrate intake (not sugar intake). Discussed having lean protein at all meals and adding non starchy vegetables at lunch and dinner. Instructed pt to aim for 2-3 evenly spaced meals or a small carb snack in place of a missed meal. Written education information provided to patient for use at home. Pt verbalized understanding of all the above.       Task: Reviewed the sources and role of Carbohydrate, Protein, and Fat and how each nutrient impacts blood sugar. Completed 7/27/2023        Task: Provided visual examples using dry measuring cups, food models, and other familiar objects such as computer mouse, deck or cards, tennis ball etc. to help with visualization of portions. Completed 7/27/2023      Task: Recommended replacing beverages containing high sugar content with noncaloric/sugar free options and/or water. Completed 7/27/2023        Task: Review the importance of balancing carbohydrates with each meal using portion control techniques to count servings of carbohydrate and label reading to identify serving size and amount of total carbs per serving. Completed 7/27/2023        Task: Provided Sample plate method and reviewed the use of the plate to estimate amounts of carbohydrate per meal. Completed 7/27/2023          Follow Up Plan     Follow up in about 22 days (around 8/18/2023) for F/U #1 review BS logs and meal planning.    Today's care plan and follow up schedule was discussed with patient.  Machelle verbalized understanding of the care plan, goals, and agrees to follow up plan.        The patient was encouraged to communicate with his/her health care provider/physician and care team regarding his/her condition(s) and treatment.  I provided the patient with my contact information today and encouraged to contact me via phone or [facility] Patient Portal as needed.     Length of Visit   Total Time: 60 Minutes

## 2023-07-31 ENCOUNTER — PATIENT MESSAGE (OUTPATIENT)
Dept: RESEARCH | Facility: HOSPITAL | Age: 63
End: 2023-07-31
Payer: COMMERCIAL

## 2023-08-11 ENCOUNTER — OFFICE VISIT (OUTPATIENT)
Dept: FAMILY MEDICINE | Facility: CLINIC | Age: 63
End: 2023-08-11
Payer: COMMERCIAL

## 2023-08-11 VITALS
WEIGHT: 191.69 LBS | DIASTOLIC BLOOD PRESSURE: 72 MMHG | TEMPERATURE: 98 F | HEART RATE: 80 BPM | BODY MASS INDEX: 31.94 KG/M2 | OXYGEN SATURATION: 95 % | HEIGHT: 65 IN | SYSTOLIC BLOOD PRESSURE: 134 MMHG | RESPIRATION RATE: 18 BRPM

## 2023-08-11 DIAGNOSIS — R07.81 RIB PAIN ON RIGHT SIDE: Primary | ICD-10-CM
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
## 2023-08-11 PROCEDURE — 99999 PR PBB SHADOW E&M-EST. PATIENT-LVL IV: CPT | Mod: PBBFAC,,, | Performed by: FAMILY MEDICINE

## 2023-08-11 PROCEDURE — 3008F PR BODY MASS INDEX (BMI) DOCUMENTED: ICD-10-PCS | Mod: CPTII,S$GLB,, | Performed by: FAMILY MEDICINE

## 2023-08-11 PROCEDURE — 1159F PR MEDICATION LIST DOCUMENTED IN MEDICAL RECORD: ICD-10-PCS | Mod: CPTII,S$GLB,, | Performed by: FAMILY MEDICINE

## 2023-08-11 PROCEDURE — 3052F PR MOST RECENT HEMOGLOBIN A1C LEVEL 8.0 - < 9.0%: ICD-10-PCS | Mod: CPTII,S$GLB,, | Performed by: FAMILY MEDICINE

## 2023-08-11 PROCEDURE — 1160F RVW MEDS BY RX/DR IN RCRD: CPT | Mod: CPTII,S$GLB,, | Performed by: FAMILY MEDICINE

## 2023-08-11 PROCEDURE — 3077F SYST BP >= 140 MM HG: CPT | Mod: CPTII,S$GLB,, | Performed by: FAMILY MEDICINE

## 2023-08-11 PROCEDURE — 1159F MED LIST DOCD IN RCRD: CPT | Mod: CPTII,S$GLB,, | Performed by: FAMILY MEDICINE

## 2023-08-11 PROCEDURE — 3077F PR MOST RECENT SYSTOLIC BLOOD PRESSURE >= 140 MM HG: ICD-10-PCS | Mod: CPTII,S$GLB,, | Performed by: FAMILY MEDICINE

## 2023-08-11 PROCEDURE — 99214 OFFICE O/P EST MOD 30 MIN: CPT | Mod: S$GLB,,, | Performed by: FAMILY MEDICINE

## 2023-08-11 PROCEDURE — 3052F HG A1C>EQUAL 8.0%<EQUAL 9.0%: CPT | Mod: CPTII,S$GLB,, | Performed by: FAMILY MEDICINE

## 2023-08-11 PROCEDURE — 3008F BODY MASS INDEX DOCD: CPT | Mod: CPTII,S$GLB,, | Performed by: FAMILY MEDICINE

## 2023-08-11 PROCEDURE — 3079F DIAST BP 80-89 MM HG: CPT | Mod: CPTII,S$GLB,, | Performed by: FAMILY MEDICINE

## 2023-08-11 PROCEDURE — 1160F PR REVIEW ALL MEDS BY PRESCRIBER/CLIN PHARMACIST DOCUMENTED: ICD-10-PCS | Mod: CPTII,S$GLB,, | Performed by: FAMILY MEDICINE

## 2023-08-11 PROCEDURE — 99999 PR PBB SHADOW E&M-EST. PATIENT-LVL IV: ICD-10-PCS | Mod: PBBFAC,,, | Performed by: FAMILY MEDICINE

## 2023-08-11 PROCEDURE — 3079F PR MOST RECENT DIASTOLIC BLOOD PRESSURE 80-89 MM HG: ICD-10-PCS | Mod: CPTII,S$GLB,, | Performed by: FAMILY MEDICINE

## 2023-08-11 PROCEDURE — 99214 PR OFFICE/OUTPT VISIT, EST, LEVL IV, 30-39 MIN: ICD-10-PCS | Mod: S$GLB,,, | Performed by: FAMILY MEDICINE

## 2023-08-11 RX ORDER — IBUPROFEN 800 MG/1
800 TABLET ORAL 3 TIMES DAILY PRN
Qty: 60 TABLET | Refills: 1 | Status: SHIPPED | OUTPATIENT
Start: 2023-08-11 | End: 2024-02-15

## 2023-08-11 RX ORDER — ZOLPIDEM TARTRATE 10 MG/1
TABLET ORAL
Status: CANCELLED | OUTPATIENT
Start: 2023-08-11

## 2023-08-11 RX ORDER — ALPRAZOLAM 0.25 MG/1
TABLET ORAL
Status: CANCELLED | OUTPATIENT
Start: 2023-08-11

## 2023-08-11 NOTE — PROGRESS NOTES
Routine Office Visit    Patient Name: Machelle Morin    : 1960  MRN: [id number]    Subjective:  Machelle is a 63 y.o. female who presents today for:    1. Rib pain  Patient following up after falling out of the tub in a hotel in [place].  She was seen in urgent care up there and pain has improved, but still very much present. There has been some shortness of breath.  No coughing or hemoptysis.  She did hit her head, but no headaches since the fall.  She reports having imaging done at urgent care, but no fractures.    Past Medical History  History reviewed. No pertinent past medical history.    Past Surgical History  History reviewed. No pertinent surgical history.    Family History  Family History   Problem Relation Age of Onset    Miscarriages / Stillbirths Mother     Hypertension Mother     Cancer Mother     Arthritis Mother     Alcohol abuse Father     Miscarriages / Stillbirths Sister     Asthma Sister     Asthma Daughter     Breast cancer Paternal Aunt     Early death Brother     Early death Brother        Social History  Social History     Socioeconomic History    Marital status:     Number of children: 1   Tobacco Use    Smoking status: Never     Passive exposure: Never    Smokeless tobacco: Never   Substance and Sexual Activity    Alcohol use: Yes     Alcohol/week: 1.0 standard drink of alcohol     Types: 1 Glasses of wine per week    Sexual activity: Not Currently     Partners: Male       Current Medications  Current Outpatient Medications on File Prior to Visit   Medication Sig Dispense Refill    ALPRAZolam (XANAX) 0.25 MG tablet       blood sugar diagnostic Strp To check BG 2 times daily, to use with insurance preferred meter 100 each 2    dulaglutide (TRULICITY) 0.75 mg/0.5 mL pen injector Inject 0.75 mg into the skin every 7 days. 4 pen 3    EScitalopram oxalate (LEXAPRO) 20 MG tablet Lexapro 20 mg tablet   Take 1 tablet every day by oral route.      lancets Misc To check BG 2 times  "daily, to use with insurance preferred meter 100 each 2    zolpidem (AMBIEN) 10 mg Tab Ambien 10 mg tablet   Take 1 tablet every day by oral route.      blood-glucose meter kit To check BG 2 times daily, to use with insurance preferred meter (Patient not taking: Reported on 8/11/2023) 1 each 0     No current facility-administered medications on file prior to visit.       Allergies   Review of patient's allergies indicates:   Allergen Reactions    Sulfa (sulfonamide antibiotics) Itching       Review of Systems (Pertinent positives)  Review of Systems   Constitutional: Negative.    HENT: Negative.     Eyes: Negative.    Respiratory: Negative.     Cardiovascular: Negative.    Gastrointestinal: Negative.    Musculoskeletal:  Positive for myalgias.   Skin: Negative.          /72   Pulse 80   Temp 98.3 °F (36.8 °C) (Oral)   Resp 18   Ht 5' 5" (1.651 m)   Wt 87 kg (191 lb 11 oz)   SpO2 95%   BMI 31.90 kg/m²     GENERAL APPEARANCE: in no apparent distress and well developed and well nourished  HEENT: PERRL, EOMI, Sclera clear, anicteric, Oropharynx clear, no lesions, Neck supple with midline trachea  NECK: normal, supple, no adenopathy, thyroid normal in size  RESPIRATORY: appears well, vitals normal, no respiratory distress, acyanotic, normal RR, chest clear, no wheezing, crepitations, rhonchi, normal symmetric air entry  HEART: regular rate and rhythm, S1, S2 normal, no murmur, click, rub or gallop.    ABDOMEN: abdomen is soft without tenderness, no masses, no hernias, no organomegaly, no rebound, no guarding.   SKIN: no rashes, no wounds, no other lesions  PSYCH: Alert, oriented x 3, thought content appropriate, speech normal, pleasant and cooperative, good eye contact, well groomed,    Assessment/Plan:  Machelle Morin is a 63 y.o. female who presents today for :    Machelle was seen today for follow-up, diabetes, medication refill and fall.    Diagnoses and all orders for this visit:    Fall, " subsequent encounter  - Ibuprofen for pain control  - Ibuprofen to be taken with food  - Ice packs or heat for 20 minute intervasl    Rib pain on right side  - Call if no improvement my next week  - Avoid pressure to affected side        Casper Saucedo MD

## 2023-08-18 ENCOUNTER — CLINICAL SUPPORT (OUTPATIENT)
Dept: DIABETES | Facility: CLINIC | Age: 63
End: 2023-08-18
Payer: COMMERCIAL

## 2023-08-18 VITALS — HEIGHT: 65 IN | BODY MASS INDEX: 32.05 KG/M2 | WEIGHT: 192.38 LBS

## 2023-08-18 DIAGNOSIS — E11.65 TYPE 2 DIABETES MELLITUS WITH HYPERGLYCEMIA, WITHOUT LONG-TERM CURRENT USE OF INSULIN: Primary | ICD-10-CM

## 2023-08-18 PROCEDURE — 99999 PR PBB SHADOW E&M-EST. PATIENT-LVL I: ICD-10-PCS | Mod: PBBFAC,,,

## 2023-08-18 PROCEDURE — G0108 DIAB MANAGE TRN  PER INDIV: HCPCS | Mod: S$GLB,,, | Performed by: FAMILY MEDICINE

## 2023-08-18 PROCEDURE — 99999 PR PBB SHADOW E&M-EST. PATIENT-LVL I: CPT | Mod: PBBFAC,,,

## 2023-08-18 PROCEDURE — G0108 PR DIAB MANAGE TRN  PER INDIV: ICD-10-PCS | Mod: S$GLB,,, | Performed by: FAMILY MEDICINE

## 2023-08-18 NOTE — PROGRESS NOTES
Diabetes Care Specialist Progress Note  Author: Sarah Reyna RN  Date: 8/18/2023    Program Intake  Reason for Diabetes Program Visit:: Intervention  Type of Intervention:: Individual  Individual: Education  Education: Self-Management Skill Review, Nutrition and Meal Planning  Current diabetes risk level:: moderate  In the last 12 months, have you:: used emergency room services (Pt had a fall in [place]; went to urgent care)  Was the ER or hospital admission related to diabetes?: No  Permission to speak with others about care:: yes    Lab Results   Component Value Date    HGBA1C 8.3 (H) 07/19/2023       Clinical  Patient Health Rating  Compared to other people your age, how would you rate your health?: Fair    Problem Review  Active comorbidities affecting diabetes self-care.: no    Clinical Assessment  Current Diabetes Treatment: Injectable (Trulicity 0.75mg  weekly)  Have you ever experienced hypoglycemia (low blood sugar)?: yes  In the last month, how often have you experienced low blood sugar?: once every other week  Are you able to tell when your blood sugar is low?: Yes  What symptoms do you experience?: Dizzy/Light-headed  Have you ever been hospitalized because your blood sugar was too low?: no  How do you treat hypoglycemia (low blood sugar)?: 5-6 pieces of hard candy  Have you ever experienced hyperglycemia (high blood sugar)?: no    Medication Information  How do you obtain your medications?: Patient drives  How many days a week do you miss your medications?: Never  Do you sometimes have difficulty refilling your medications?: No  Medication adherence impacting ability to self-manage diabetes?: No    Labs  Do you have regular lab work to monitor your medications?: Yes  Type of Regular Lab Work: A1c  Where do you get your labs drawn?: [facility]  Lab Compliance Barriers: No    Nutritional Status  Diet: Regular  Meal Plan 24 Hour Recall: Breakfast, Lunch, Dinner, Snack  Meal Plan 24 Hour Recall - Breakfast:  bagel w/peanut butter and coffee  Meal Plan 24 Hour Recall - Lunch: sometimes skip  Meal Plan 24 Hour Recall - Dinner: beans and chicken or meat w/carrots, squash, and cucumbers  Meal Plan 24 Hour Recall - Snack: nuts (almonds and pecans)  Change in appetite?: Yes  Recent Changes in Weight: No Recent Weight Change  Current nutritional status an area of need that is impacting patient's ability to self-manage diabetes?: No    Additional Social History  Support  Does anyone support you with your diabetes care?: yes  Who supports you?: self, son/daughter  Who takes you to your medical appointments?: self  Does the current support meet the patient's needs?: Yes  Is Support an area impacting ability to self-manage diabetes?: No    Access to Mass Media & Technology  Does the patient have access to any of the following devices or technologies?: Smart phone  Media or technology needs impacting ability to self-manage diabetes?: No    Cognitive/Behavioral Health  Alert and Oriented: Yes  Difficulty Thinking: No  Requires Prompting: No  Requires assistance for routine expression?: No  Cognitive or behavioral barriers impacting ability to self-manage diabetes?: No    Culture/Tenriism  Culture or Synagogue beliefs that may impact ability to access healthcare: No    Communication  Language preference: English  Hearing Problems: No  Vision Problems: Yes  Vision problem type:: Decreased Vision  Vision Assistance: Glasses  Communication needs impacting ability to self-manage diabetes?: No    Health Literacy  Preferred Learning Method: Face to Face, Reading Materials  How often do you need to have someone help you read instructions, pamphlets, or written material from your doctor or pharmacy?: Never  Health literacy needs impacting ability to self-manage diabetes?: No      Diabetes Self-Management Skills Assessment  Diabetes Disease Process/Treatment Options  Patient/caregiver able to state what happens when someone has diabetes.:  somewhat  Patient/caregiver knows what type of diabetes they have.: yes  Diabetes Type : Type II  Patient/caregiver able to identify at least three signs and symptoms of diabetes.: no  Patient able to identify at least three risk factors for diabetes.: no  Diabetes Disease Process/Treatment Options: Skills Assessment Completed: Yes  Assessment indicates:: Instruction Needed  Area of need?: Yes    Nutrition/Healthy Eating  Challenges to healthy eating:: portion control  Method of carbohydrate measurement:: eyeballing/guessing  Patient can identify foods that impact blood sugar.: yes  Patient-identified foods:: fruit/fruit juice, milk, soda, starches (bread, pasta, rice, cereal), sweets, starchy vegetables (corn, peas, beans)  Nutrition/Healthy Eating Skills Assessment Completed:: Yes  Assessment indicates:: Instruction Needed, Knowledge deficit  Area of need?: Yes    Physical Activity/Exercise  Patient's daily activity level:: lightly active (No exercise since her fall 1 week ago)  Patient formally exercises outside of work.: yes  Exercise Type: walking  Intensity: Low  Frequency: four or more times a week  Duration: 45 min  Patient can identify forms of physical activity.: yes  Stated forms of physical activity:: any movement performed by muscles that uses energy  Patient can identify reasons why exercise/physical activity is important in diabetes management.: no  Physical Activity/Exercise Skills Assessment Completed: : Yes  Assessment indicates:: Instruction Needed, Knowledge deficit  Area of need?: No    Medications  Patient is able to describe current diabetes management routine.: yes  Diabetes management routine:: diet, exercise, injectable medications  Patient is able to identify current diabetes medications, dosages, and appropriate timing of medications.: yes  Patient understands the purpose of the medications taken for diabetes.: no  Patient reports problems or concerns with current medication regimen.:  no  Medication Skills Assessment Completed:: Yes  Assessment indicates:: Instruction Needed, Knowledge deficit  Area of need?: Yes    Home Blood Glucose Monitoring  Patient states that blood sugar is checked at home daily.: yes (Pt have not check BS within the last week due to increase pain following a recent fall.)  Monitoring Method:: home glucometer  Home glucometer meter type:: Insurance Preferred Meter  How often do you check your blood sugar?: Once a day  When do you check your blood sugar?: Before breakfast  Blood glucose logs:: no, encouraged to keep logs, encouraged to bring logs to provider visits  Blood glucose logs reviewed today?: no  Home Blood Glucose Monitoring Skills Assessment Completed: : Yes  Assessment indicates:: Instruction Needed, Knowledge deficit  Area of need?: Yes    Acute Complications  Patient is able to identify types of acute complications: Yes  Patient Identified:: Hypoglycemia  Patient is able to state the basic meaning of hypoglycemia?: No  Able to state the blood sugar range for hypoglycemia?: yes  Patient stated range:: <70  Patient can identify general symptoms of hypoglycemia: yes  Patient identified:: fatigue, dizziness  Able to state proper treatment of hypoglycemia?: yes  Patient identified:: 5-6 pieces of hard candy  Acute Complications Skills Assessment Completed: : Yes  Assessment indicates:: Instruction Needed  Area of need?: Yes    Chronic Complications  Chronic Complications Skills Assessment Completed: : No  Deferred due to:: Time    Psychosocial/Coping  Psychosocial/Coping Skills Assessment Completed: : No  Deffered due to:: Time      Assessment Summary and Plan    Based on today's diabetes care assessment, the following areas of need were identified:      Social 8/18/2023   Support No   Access to Mass Media/Tech No   Cognitive/Behavioral Health No   Culture/Mu-ism No   Communication No   Health Literacy No        Clinical 8/18/2023   Medication Adherence No   Lab  Compliance No   Nutritional Status No        Diabetes Self-Management Skills 8/18/2023   Diabetes Disease Process/Treatment Options Yes- Reviewed DM management guide and discussed the significance of current A1c    Nutrition/Healthy Eating Yes- see care planning   Physical Activity/Exercise No   Medication Yes- Discuss the MOA and common side-effects of Trulicity.   Home Blood Glucose Monitoring Yes- Pt to re-start monitoring/recording BS again; wasn't monitoring post fall 1 week ago. Discussed BS goals and importance of monitoring for review and maintenance of medication.   Acute Complications Yes- Pt had 2 hypo episodes due to skipping meals. Reviewed prevention, detection, signs and symptoms, and treatment of hypoglycemia following rule of 15.           Today's interventions were provided through individual discussion, instruction, and written materials were provided.      Patient verbalized understanding of instruction and written materials.  Pt was able to return back demonstration of instructions today. Patient understood key points, needs reinforcement and further instruction.     Diabetes Self-Management Care Plan:    Today's Diabetes Self-Management Care Plan was developed with Machelle's input. Machelle has agreed to work toward the following goal(s) to improve his/her overall diabetes control.      Care Plan: Diabetes Management   Updates made since 7/19/2023 12:00 AM        Problem: Healthy Eating         Goal: Eat 2-3 meals daily with 30-45g/2-3 servings of Carbohydrate per meal. Limit snacking in between meal to 1 serving (15 grams).    Start Date: 7/27/2023   Expected End Date: 8/18/2023   This Visit's Progress: On track   Priority: High   Barriers: No Barriers Identified   Note:    7/27/23 - - We concentrated on portion sizes at today's session. Overall meal planning and various choices for meals. Discussed carb vs non-carb foods, appropriate amount of carbs to have at meals/snacks and acceptable serving  sizes of individual carb items. Obtained a 24-hr food recall from patient. Discussed various meal plan options to promote healthy eating. Pt says since May, she's cut out snacking a lot and sweets like apple fritters etc.     - - Practiced reading food labels on various food items with patient focusing on serving size and total carbohydrate intake (not sugar intake). Discussed having lean protein at all meals and adding non starchy vegetables at lunch and dinner. Instructed pt to aim for 2-3 evenly spaced meals or a small carb snack in place of a missed meal. Written education information provided to patient for use at home. Pt verbalized understanding of all the above.    Care Plan Update 8/18/23 - - Pt says she hasn't been eating much and she's on Trulicity. Says she's eyeballing her portion sizes and not monitoring. Reminded pt of appropriate amount of carbs to have at meals/snacks and acceptable serving sizes of individual carb items. Also discussed carb sources of foods and having lean protein at all meals and non starchy vegetables at lunch and dinner. Pt verbalized understanding.        Task: Reviewed the sources and role of Carbohydrate, Protein, and Fat and how each nutrient impacts blood sugar. Completed 7/27/2023        Task: Provided visual examples using dry measuring cups, food models, and other familiar objects such as computer mouse, deck or cards, tennis ball etc. to help with visualization of portions. Completed 7/27/2023     Task: Recommended replacing beverages containing high sugar content with noncaloric/sugar free options and/or water. Completed 7/27/2023        Task: Review the importance of balancing carbohydrates with each meal using portion control techniques to count servings of carbohydrate and label reading to identify serving size and amount of total carbs per serving. Completed 7/27/2023        Task: Provided Sample plate method and reviewed the use of the plate to estimate amounts of  carbohydrate per meal. Completed 7/27/2023          Follow Up Plan     Follow up in about 5 weeks (around 9/22/2023) for F/U #2 review BS log and meal planning.    Today's care plan and follow up schedule was discussed with patient.  Machelle verbalized understanding of the care plan, goals, and agrees to follow up plan.        The patient was encouraged to communicate with his/her health care provider/physician and care team regarding his/her condition(s) and treatment.  I provided the patient with my contact information today and encouraged to contact me via phone or [facility] Patient Portal as needed.     Length of Visit   Total Time: 60 Minutes

## 2023-08-22 ENCOUNTER — PATIENT MESSAGE (OUTPATIENT)
Dept: FAMILY MEDICINE | Facility: CLINIC | Age: 63
End: 2023-08-22
Payer: COMMERCIAL

## 2023-09-05 ENCOUNTER — OFFICE VISIT (OUTPATIENT)
Dept: OBSTETRICS AND GYNECOLOGY | Facility: CLINIC | Age: 63
End: 2023-09-05
Attending: OBSTETRICS & GYNECOLOGY
Payer: COMMERCIAL

## 2023-09-05 VITALS
DIASTOLIC BLOOD PRESSURE: 90 MMHG | WEIGHT: 192.81 LBS | HEIGHT: 65 IN | SYSTOLIC BLOOD PRESSURE: 154 MMHG | BODY MASS INDEX: 32.12 KG/M2

## 2023-09-05 DIAGNOSIS — Z01.419 ENCOUNTER FOR GYNECOLOGICAL EXAMINATION WITHOUT ABNORMAL FINDING: Primary | ICD-10-CM

## 2023-09-05 DIAGNOSIS — Z12.4 ENCOUNTER FOR PAPANICOLAOU SMEAR FOR CERVICAL CANCER SCREENING: ICD-10-CM

## 2023-09-05 DIAGNOSIS — N95.2 POSTMENOPAUSAL ATROPHIC VAGINITIS: ICD-10-CM

## 2023-09-05 PROCEDURE — 3008F PR BODY MASS INDEX (BMI) DOCUMENTED: ICD-10-PCS | Mod: CPTII,S$GLB,, | Performed by: OBSTETRICS & GYNECOLOGY

## 2023-09-05 PROCEDURE — 3052F PR MOST RECENT HEMOGLOBIN A1C LEVEL 8.0 - < 9.0%: ICD-10-PCS | Mod: CPTII,S$GLB,, | Performed by: OBSTETRICS & GYNECOLOGY

## 2023-09-05 PROCEDURE — 99999 PR PBB SHADOW E&M-EST. PATIENT-LVL III: ICD-10-PCS | Mod: PBBFAC,,, | Performed by: OBSTETRICS & GYNECOLOGY

## 2023-09-05 PROCEDURE — 87624 HPV HI-RISK TYP POOLED RSLT: CPT | Performed by: OBSTETRICS & GYNECOLOGY

## 2023-09-05 PROCEDURE — 3008F BODY MASS INDEX DOCD: CPT | Mod: CPTII,S$GLB,, | Performed by: OBSTETRICS & GYNECOLOGY

## 2023-09-05 PROCEDURE — 87625 HPV TYPES 16 & 18 ONLY: CPT | Mod: 59 | Performed by: OBSTETRICS & GYNECOLOGY

## 2023-09-05 PROCEDURE — 1159F PR MEDICATION LIST DOCUMENTED IN MEDICAL RECORD: ICD-10-PCS | Mod: CPTII,S$GLB,, | Performed by: OBSTETRICS & GYNECOLOGY

## 2023-09-05 PROCEDURE — 1160F PR REVIEW ALL MEDS BY PRESCRIBER/CLIN PHARMACIST DOCUMENTED: ICD-10-PCS | Mod: CPTII,S$GLB,, | Performed by: OBSTETRICS & GYNECOLOGY

## 2023-09-05 PROCEDURE — 3052F HG A1C>EQUAL 8.0%<EQUAL 9.0%: CPT | Mod: CPTII,S$GLB,, | Performed by: OBSTETRICS & GYNECOLOGY

## 2023-09-05 PROCEDURE — 3080F DIAST BP >= 90 MM HG: CPT | Mod: CPTII,S$GLB,, | Performed by: OBSTETRICS & GYNECOLOGY

## 2023-09-05 PROCEDURE — 3077F SYST BP >= 140 MM HG: CPT | Mod: CPTII,S$GLB,, | Performed by: OBSTETRICS & GYNECOLOGY

## 2023-09-05 PROCEDURE — 99386 PR PREVENTIVE VISIT,NEW,40-64: ICD-10-PCS | Mod: S$GLB,,, | Performed by: OBSTETRICS & GYNECOLOGY

## 2023-09-05 PROCEDURE — 3080F PR MOST RECENT DIASTOLIC BLOOD PRESSURE >= 90 MM HG: ICD-10-PCS | Mod: CPTII,S$GLB,, | Performed by: OBSTETRICS & GYNECOLOGY

## 2023-09-05 PROCEDURE — 88175 CYTOPATH C/V AUTO FLUID REDO: CPT | Performed by: OBSTETRICS & GYNECOLOGY

## 2023-09-05 PROCEDURE — 3077F PR MOST RECENT SYSTOLIC BLOOD PRESSURE >= 140 MM HG: ICD-10-PCS | Mod: CPTII,S$GLB,, | Performed by: OBSTETRICS & GYNECOLOGY

## 2023-09-05 PROCEDURE — 1159F MED LIST DOCD IN RCRD: CPT | Mod: CPTII,S$GLB,, | Performed by: OBSTETRICS & GYNECOLOGY

## 2023-09-05 PROCEDURE — 99999 PR PBB SHADOW E&M-EST. PATIENT-LVL III: CPT | Mod: PBBFAC,,, | Performed by: OBSTETRICS & GYNECOLOGY

## 2023-09-05 PROCEDURE — 99386 PREV VISIT NEW AGE 40-64: CPT | Mod: S$GLB,,, | Performed by: OBSTETRICS & GYNECOLOGY

## 2023-09-05 PROCEDURE — 1160F RVW MEDS BY RX/DR IN RCRD: CPT | Mod: CPTII,S$GLB,, | Performed by: OBSTETRICS & GYNECOLOGY

## 2023-09-05 RX ORDER — ESTRADIOL 0.1 MG/G
1 CREAM VAGINAL
Qty: 42 G | Refills: 3 | Status: SHIPPED | OUTPATIENT
Start: 2023-09-07 | End: 2023-11-14

## 2023-09-05 NOTE — PROGRESS NOTES
Subjective:       Patient ID: Machelle Morin is a 63 y.o. female.    Chief Complaint:  Annual Exam and Gynecologic Exam      History of Present Illness  Gynecologic Exam  Pertinent negatives include no abdominal pain, back pain or headaches.       Machelle Morin is a 63 y.o. female  here for her annual GYN exam.  She has not been seen in many years. Has been caring for her spouse with multiple medical issues due to Agent Orange, he passed away in .    She is menopausal since age 53, and is not on HRT.  denies break through bleeding.   Reports vaginal itching or irritation.(Has had recurrent yeast infections for the past few years), currently no problems was treated with Boric Acid recently by her PCP.   Denies vaginal discharge.  She is not sexually active. She is  since  after 38 years     History of abnormal pap: No  Last Pap: was normal and patient does not recall when last pap was  Last MMG: normal-2023-routine follow-up in 12 months  Last Colonoscopy:  colonoscopy 10 years ago without abnormalities.(Scheduled)  denies domestic violence. She does feel safe at home. (Lives with her daughter)    Past Medical History:   Diagnosis Date    Diabetes mellitus      History reviewed. No pertinent surgical history.  Social History     Socioeconomic History    Marital status:     Number of children: 1   Tobacco Use    Smoking status: Never     Passive exposure: Never    Smokeless tobacco: Never   Substance and Sexual Activity    Alcohol use: Yes     Alcohol/week: 1.0 standard drink of alcohol     Types: 1 Glasses of wine per week    Sexual activity: Not Currently     Partners: Male     Comment:  since  after 38 years marriage     Family History   Problem Relation Age of Onset    Alcohol abuse Father     Miscarriages / Stillbirths Mother     Hypertension Mother     Cancer Mother     Arthritis Mother     Pancreatic cancer Mother 83    Early death Brother     Early  "death Brother     Miscarriages / Stillbirths Sister     Asthma Sister     Asthma Daughter     Colon cancer Neg Hx     Diabetes Neg Hx     Stroke Neg Hx      OB History          2    Para   1    Term   1            AB   1    Living   1         SAB   1    IAB        Ectopic        Multiple        Live Births   1                 BP (!) 154/90   Ht 5' 5" (1.651 m)   Wt 87.5 kg (192 lb 12.8 oz)   BMI 32.08 kg/m²         GYN & OB History  No LMP recorded. Patient is postmenopausal.   Date of Last Pap: No result found    OB History    Para Term  AB Living   2 1 1   1 1   SAB IAB Ectopic Multiple Live Births   1       1      # Outcome Date GA Lbr Liban/2nd Weight Sex Delivery Anes PTL Lv   2 Term     F CS-LTranv   MIKY   1 SAB                Review of Systems  Review of Systems   Constitutional:  Negative for activity change, appetite change, fatigue and unexpected weight change.   HENT: Negative.     Eyes:  Negative for visual disturbance.   Respiratory:  Negative for shortness of breath and wheezing.    Cardiovascular:  Negative for chest pain, palpitations and leg swelling.   Gastrointestinal:  Negative for abdominal pain, bloating and blood in stool.   Endocrine: Positive for diabetes. Negative for hair loss.   Genitourinary:  Positive for vaginal dryness.   Musculoskeletal:  Negative for back pain and joint swelling.   Integumentary:  Negative for acne, hair changes and nipple discharge.   Neurological:  Negative for headaches.   Hematological:  Does not bruise/bleed easily.   Psychiatric/Behavioral:  Positive for depression. Negative for sleep disturbance. The patient is not nervous/anxious.    Breast: Negative for mastodynia and nipple discharge          Objective:      Physical Exam:   Constitutional: She is oriented to person, place, and time. She appears well-developed and well-nourished.    HENT:   Head: Normocephalic and atraumatic.    Eyes: Pupils are equal, round, and reactive to " light. EOM are normal.     Cardiovascular:  Normal rate and regular rhythm.             Pulmonary/Chest: Effort normal and breath sounds normal.   BREASTS:  no mass, no tenderness, no deformity and no retraction. Right breast exhibits no inverted nipple, no mass, no nipple discharge, no skin change, no tenderness, no bleeding and no swelling. Left breast exhibits no inverted nipple, no mass, no nipple discharge, no skin change, no tenderness, no bleeding and no swelling. Breasts are symmetrical.              Abdominal: Soft. Bowel sounds are normal.     Genitourinary:    Pelvic exam was performed with patient supine.      Genitourinary Comments: PELVIC: Normal external genitalia without lesions.  Normal hair distribution.  Adequate perineal body, normal urethral meatus.  Vagina  Dry and poorly rugated, atrophic, without lesions or discharge.  Cervix pink, without lesions, discharge or tenderness.  No significant cystocele or rectocele.  Bimanual exam shows uterus to be normal size, regular, mobile and nontender.  Adnexa without masses or tenderness.    RECTAL:Deferred                 Musculoskeletal: Normal range of motion and moves all extremeties.       Neurological: She is alert and oriented to person, place, and time.    Skin: Skin is warm and dry.    Psychiatric: She has a normal mood and affect.              Assessment:        1. Encounter for gynecological examination without abnormal finding    2. Encounter for Papanicolaou smear for cervical cancer screening    3. Postmenopausal atrophic vaginitis                Plan:        Problem List Items Addressed This Visit    None  Visit Diagnoses       Encounter for gynecological examination without abnormal finding    -  Primary  COUNSELING:  The patient was counseled today on regular weight bearing exercise. Patient was counseled today on the new ACS guidelines for cervical cytology screening as well as the current recommendations for breast cancer screening.  Counseling session lasted approximately 10 minutes, and all her questions were answered. She was advised to see her primary care physician for all other health maintenance.   FOLLOW-UP with me for next routine visit.       Encounter for Papanicolaou smear for cervical cancer screening        Relevant Orders    HPV High Risk Genotypes, PCR    Liquid-Based Pap Smear, Screening    Postmenopausal atrophic vaginitis        Relevant Medications    estradioL (ESTRACE) 0.01 % (0.1 mg/gram) vaginal cream (Start on 9/7/2023)             Follow up in about 1 year (around 9/5/2024).

## 2023-09-12 LAB
CLINICAL INFO: NORMAL
CYTO CVX: NORMAL
CYTOLOGIST CVX/VAG CYTO: NORMAL
CYTOLOGIST CVX/VAG CYTO: NORMAL
CYTOLOGY CMNT CVX/VAG CYTO-IMP: NORMAL
CYTOLOGY PAP THIN PREP EXPLANATION: NORMAL
DATE OF PREVIOUS PAP: NORMAL
DATE PREVIOUS BX: NO
GEN CATEG CVX/VAG CYTO-IMP: NORMAL
HPV I/H RISK 4 DNA CVX QL NAA+PROBE: DETECTED
HPV16 DNA CVX QL PROBE+SIG AMP: DETECTED
HPV18 DNA CVX QL PROBE+SIG AMP: NOT DETECTED
LMP START DATE: NORMAL
MICROORGANISM CVX/VAG CYTO: NORMAL
PATHOLOGIST CVX/VAG CYTO: NORMAL
SERVICE CMNT-IMP: NORMAL
SPECIMEN SOURCE CVX/VAG CYTO: NORMAL
STAT OF ADQ CVX/VAG CYTO-IMP: NORMAL

## 2023-09-15 ENCOUNTER — TELEPHONE (OUTPATIENT)
Dept: FAMILY MEDICINE | Facility: CLINIC | Age: 63
End: 2023-09-15
Payer: COMMERCIAL

## 2023-09-15 NOTE — TELEPHONE ENCOUNTER
Spoke with pt about her [place] [employer] paper work pt states that she has uncontrolled Diabetes and having problems with her eye pt sees the eye doctor once a week vs

## 2023-09-19 ENCOUNTER — CLINICAL SUPPORT (OUTPATIENT)
Dept: ENDOSCOPY | Facility: HOSPITAL | Age: 63
End: 2023-09-19
Attending: FAMILY MEDICINE
Payer: COMMERCIAL

## 2023-09-19 ENCOUNTER — TELEPHONE (OUTPATIENT)
Dept: ENDOSCOPY | Facility: HOSPITAL | Age: 63
End: 2023-09-19

## 2023-09-19 DIAGNOSIS — Z12.11 ENCOUNTER FOR SCREENING COLONOSCOPY: ICD-10-CM

## 2023-09-19 NOTE — TELEPHONE ENCOUNTER
Called patient for PAT appointment to schedule colonoscopy. Patient wants to schedule [place] [facility] [place]. [facility] [place] schedule is completely booked through December. Patient stated she will wait for the January schedule.

## 2023-09-19 NOTE — PLAN OF CARE
Called patient for PAT appointment to schedule colonoscopy. Patient wants to schedule at [facility] endoscopy. [facility] endoscopy schedule is completely booked through December. Patient stated she will wait for the January schedule.

## 2023-09-20 ENCOUNTER — TELEPHONE (OUTPATIENT)
Dept: FAMILY MEDICINE | Facility: CLINIC | Age: 63
End: 2023-09-20
Payer: COMMERCIAL

## 2023-09-22 ENCOUNTER — CLINICAL SUPPORT (OUTPATIENT)
Dept: DIABETES | Facility: CLINIC | Age: 63
End: 2023-09-22
Payer: COMMERCIAL

## 2023-09-22 VITALS — WEIGHT: 192.19 LBS | HEIGHT: 65 IN | BODY MASS INDEX: 32.02 KG/M2

## 2023-09-22 DIAGNOSIS — E11.65 TYPE 2 DIABETES MELLITUS WITH HYPERGLYCEMIA, WITHOUT LONG-TERM CURRENT USE OF INSULIN: Primary | ICD-10-CM

## 2023-09-22 PROCEDURE — 99999 PR PBB SHADOW E&M-EST. PATIENT-LVL II: CPT | Mod: PBBFAC,,,

## 2023-09-22 PROCEDURE — G0108 PR DIAB MANAGE TRN  PER INDIV: ICD-10-PCS | Mod: S$GLB,,, | Performed by: FAMILY MEDICINE

## 2023-09-22 PROCEDURE — 99999 PR PBB SHADOW E&M-EST. PATIENT-LVL II: ICD-10-PCS | Mod: PBBFAC,,,

## 2023-09-22 PROCEDURE — G0108 DIAB MANAGE TRN  PER INDIV: HCPCS | Mod: S$GLB,,, | Performed by: FAMILY MEDICINE

## 2023-09-25 NOTE — PROGRESS NOTES
"Diabetes Care Specialist Progress Note  Author: Sarah Reyna RN  Date: 9/22/2023    Program Intake  Reason for Diabetes Program Visit:: Intervention  Type of Intervention:: Individual  Individual: Education  Education: Self-Management Skill Review, Nutrition and Meal Planning  Current diabetes risk level:: moderate  In the last 12 months, have you:: used emergency room services  Was the ER or hospital admission related to diabetes?: No  Permission to speak with others about care:: yes    Lab Results   Component Value Date    HGBA1C 8.3 (H) 07/19/2023       Clinical  Weight: 87.2 kg (192 lb 3.2 oz)   Height: 5' 5" (165.1 cm)   Body mass index is 31.98 kg/m².    Patient Health Rating  Compared to other people your age, how would you rate your health?: Good    Problem Review  Active comorbidities affecting diabetes self-care.: no    Clinical Assessment  Current Diabetes Treatment: Injectable (Trulicity 0.75 mg weekly)  Have you ever experienced hypoglycemia (low blood sugar)?: no  Are you able to tell when your blood sugar is low?: No  Have you ever been hospitalized because your blood sugar was too low?: no    Medication Information  How do you obtain your medications?: Patient drives  How many days a week do you miss your medications?: Never  Do you sometimes have difficulty refilling your medications?: No  Medication adherence impacting ability to self-manage diabetes?: No    Labs  Do you have regular lab work to monitor your medications?: Yes  Type of Regular Lab Work: A1c  Where do you get your labs drawn?: [facility]  Lab Compliance Barriers: No    Nutritional Status  Diet: Regular  Meal Plan 24 Hour Recall: Breakfast, Lunch, Dinner  Meal Plan 24 Hour Recall - Breakfast: turkey and cheese sandwich on keto bread with coffee  Meal Plan 24 Hour Recall - Lunch: skip 2-3 days.....otherwise, smoked turkey and cheese sandwich on keto bread  Meal Plan 24 Hour Recall - Dinner: non-starchy vegetables, w/baked chicken and " sprite zero  Change in appetite?: No  Recent Changes in Weight: No Recent Weight Change  Was weight loss intentional or unintentional?: Intentional  Current nutritional status an area of need that is impacting patient's ability to self-manage diabetes?: No    Additional Social History  Support  Does anyone support you with your diabetes care?: yes  Who supports you?: son/daughter  Who takes you to your medical appointments?: self  Does the current support meet the patient's needs?: Yes  Is Support an area impacting ability to self-manage diabetes?: No    Access to Mass Media & Technology  Does the patient have access to any of the following devices or technologies?: Smart phone  Media or technology needs impacting ability to self-manage diabetes?: No    Cognitive/Behavioral Health  Alert and Oriented: Yes  Difficulty Thinking: No  Requires Prompting: No  Requires assistance for routine expression?: No  Cognitive or behavioral barriers impacting ability to self-manage diabetes?: No    Culture/Judaism  Culture or Christianity beliefs that may impact ability to access healthcare: No    Communication  Language preference: English  Hearing Problems: No  Vision Problems: Yes  Vision problem type:: Decreased Vision  Vision Assistance: Glasses  Communication needs impacting ability to self-manage diabetes?: No    Health Literacy  Preferred Learning Method: Face to Face, Reading Materials  How often do you need to have someone help you read instructions, pamphlets, or written material from your doctor or pharmacy?: Never  Health literacy needs impacting ability to self-manage diabetes?: No      Diabetes Self-Management Skills Assessment  Diabetes Disease Process/Treatment Options  Patient/caregiver able to state what happens when someone has diabetes.: somewhat  Patient/caregiver knows what type of diabetes they have.: yes  Diabetes Type : Type II  Patient/caregiver able to identify at least three signs and symptoms of  diabetes.: no  Patient able to identify at least three risk factors for diabetes.: no  Diabetes Disease Process/Treatment Options: Skills Assessment Completed: Yes  Assessment indicates:: Instruction Needed, Knowledge deficit  Area of need?: Yes    Nutrition/Healthy Eating  Challenges to healthy eating:: portion control  Method of carbohydrate measurement:: no method  Patient can identify foods that impact blood sugar.: yes  Patient-identified foods:: soda, sweets, starches (bread, pasta, rice, cereal)  Assessment indicates:: Instruction Needed, Knowledge deficit  Area of need?: Yes    Physical Activity/Exercise  Physical Activity/Exercise Skills Assessment Completed: : No  Area of need?: Yes    Medications  Patient is able to describe current diabetes management routine.: yes  Diabetes management routine:: diet, injectable medications  Patient is able to identify current diabetes medications, dosages, and appropriate timing of medications.: yes  Patient understands the purpose of the medications taken for diabetes.: no  Patient reports problems or concerns with current medication regimen.: no  Medication Skills Assessment Completed:: Yes  Assessment indicates:: Instruction Needed  Area of need?: No    Home Blood Glucose Monitoring  Patient states that blood sugar is checked at home daily.: yes  Monitoring Method:: home glucometer  Home glucometer meter type:: Insurance Preferred Meter  How often do you check your blood sugar?: Once a day  When do you check your blood sugar?: Before breakfast  When you check what is your typical blood sugar range? : 109, 119, 160  Blood glucose logs:: no, encouraged to keep logs, encouraged to bring logs to provider visits  Blood glucose logs reviewed today?: no  Home Blood Glucose Monitoring Skills Assessment Completed: : Yes  Assessment indicates:: Instruction Needed  Area of need?: Yes    Acute Complications  Patient is able to identify types of acute complications: No  Acute  Complications Skills Assessment Completed: : Yes  Area of need?: Yes    Chronic Complications  Chronic Complications Skills Assessment Completed: : No  Deferred due to:: Time    Psychosocial/Coping  Patient can identify ways of coping with chronic disease.: no (see comments)  Psychosocial/Coping Skills Assessment Completed: : Yes  Assessment indicates:: Instruction Needed  Area of need?: Yes      Assessment Summary and Plan    Based on today's diabetes care assessment, the following areas of need were identified:          9/22/2023    12:01 AM   Social   Support No   Access to Mass Media/Tech No   Cognitive/Behavioral Health No   Culture/Taoism No   Communication No   Health Literacy No            9/22/2023    12:01 AM   Clinical   Medication Adherence No   Lab Compliance No   Nutritional Status No            9/22/2023    12:01 AM   Diabetes Self-Management Skills   Diabetes Disease Process/Treatment Options Yes- Provided DM management guide and discussed what is diabetes and the significance of current A1c.    Nutrition/Healthy Eating Yes- see care planning   Physical Activity/Exercise Yes- Stressed importance of exercise as it relates to insulin resistance and weight loss.   Medication No   Home Blood Glucose Monitoring Yes- Discussed BS goals and importance of monitoring and recording BS for review and maintenance of medication.   Acute Complications Yes- Discussed prevention, detection, signs and symptoms, and treatment of hypoglycemia following rule of 15.    Psychosocial/Coping Yes- Discussed stress and diabetes and ways to cope with stress such as breathing exercises, movement, and reaching out to family/friends for support, and etc.          Today's interventions were provided through individual discussion, instruction, and written materials were provided.      Patient verbalized understanding of instruction and written materials.  Pt was able to return back demonstration of instructions today. Patient  understood key points, needs reinforcement and further instruction.     Diabetes Self-Management Care Plan:    Today's Diabetes Self-Management Care Plan was developed with Machelle's input. Machelle has agreed to work toward the following goal(s) to improve his/her overall diabetes control.      Care Plan: Diabetes Management   Updates made since 8/26/2023 12:00 AM        Problem: Healthy Eating         Goal: Eat 2-3 meals daily with 30-45g/2-3 servings of Carbohydrate per meal. Limit snacking in between meal to 1 serving (15 grams).    Start Date: 7/27/2023   Expected End Date: 8/18/2023   This Visit's Progress: No change   Recent Progress: On track   Priority: High   Barriers: No Barriers Identified   Note:    7/27/23 - - We concentrated on portion sizes at today's session. Overall meal planning and various choices for meals. Discussed carb vs non-carb foods, appropriate amount of carbs to have at meals/snacks and acceptable serving sizes of individual carb items. Obtained a 24-hr food recall from patient. Discussed various meal plan options to promote healthy eating. Pt says since May, she's cut out snacking a lot and sweets like apple fritters etc.     - - Practiced reading food labels on various food items with patient focusing on serving size and total carbohydrate intake (not sugar intake). Discussed having lean protein at all meals and adding non starchy vegetables at lunch and dinner. Instructed pt to aim for 2-3 evenly spaced meals or a small carb snack in place of a missed meal. Written education information provided to patient for use at home. Pt verbalized understanding of all the above.    Care Plan Update 8/18/23 - - Pt says she hasn't been eating much and she's on Trulicity. Says she's eyeballing her portion sizes and not monitoring. Reminded pt of appropriate amount of carbs to have at meals/snacks and acceptable serving sizes of individual carb items. Also discussed carb sources of foods and having  lean protein at all meals and non starchy vegetables at lunch and dinner. Pt verbalized understanding.     Care Plan Update 9/22/23 - - Pt was confused about serving sizes and sources of carbohydrate foods. We discussed in details carb vs non-carb foods, appropriate amount of carbs to have at meals/snacks and acceptable serving sizes of individual carb items. We reviewed reading food labels on various food items with patient focusing on serving size and total carbohydrate intake (not sugar intake). Encouraged pt to aim for 3 evenly spaced meals or a small carb snack in place of a missed meal. Pt verbalized understanding.        Follow Up Plan     Follow up in about 18 days (around 10/10/2023) for F/U #3 review portion sizes/carb counting and review BS log.    Today's care plan and follow up schedule was discussed with patient.  Machelle verbalized understanding of the care plan, goals, and agrees to follow up plan.        The patient was encouraged to communicate with his/her health care provider/physician and care team regarding his/her condition(s) and treatment.  I provided the patient with my contact information today and encouraged to contact me via phone or [facility] Patient Portal as needed.     Length of Visit   Total Time: 60 Minutes

## 2023-10-02 ENCOUNTER — TELEPHONE (OUTPATIENT)
Dept: FAMILY MEDICINE | Facility: CLINIC | Age: 63
End: 2023-10-02
Payer: COMMERCIAL

## 2023-10-02 NOTE — TELEPHONE ENCOUNTER
Pt states that  she is on her way to urgent care right for headache pt will call me back after leaving the urgent care vs

## 2023-10-03 ENCOUNTER — LAB VISIT (OUTPATIENT)
Dept: LAB | Facility: HOSPITAL | Age: 63
End: 2023-10-03
Attending: NURSE PRACTITIONER
Payer: COMMERCIAL

## 2023-10-03 DIAGNOSIS — E11.65 TYPE 2 DIABETES MELLITUS WITH HYPERGLYCEMIA, WITHOUT LONG-TERM CURRENT USE OF INSULIN: ICD-10-CM

## 2023-10-03 LAB
ESTIMATED AVG GLUCOSE: 154 MG/DL (ref 68–131)
HBA1C MFR BLD: 7 % (ref 4–5.6)

## 2023-10-03 PROCEDURE — 83036 HEMOGLOBIN GLYCOSYLATED A1C: CPT | Performed by: NURSE PRACTITIONER

## 2023-10-03 PROCEDURE — 36415 COLL VENOUS BLD VENIPUNCTURE: CPT | Performed by: NURSE PRACTITIONER

## 2023-10-10 ENCOUNTER — OFFICE VISIT (OUTPATIENT)
Dept: ENDOCRINOLOGY | Facility: CLINIC | Age: 63
End: 2023-10-10
Payer: COMMERCIAL

## 2023-10-10 ENCOUNTER — CLINICAL SUPPORT (OUTPATIENT)
Dept: DIABETES | Facility: CLINIC | Age: 63
End: 2023-10-10
Payer: COMMERCIAL

## 2023-10-10 DIAGNOSIS — E11.65 TYPE 2 DIABETES MELLITUS WITH HYPERGLYCEMIA, WITHOUT LONG-TERM CURRENT USE OF INSULIN: Primary | ICD-10-CM

## 2023-10-10 DIAGNOSIS — E78.5 HYPERLIPIDEMIA, UNSPECIFIED HYPERLIPIDEMIA TYPE: ICD-10-CM

## 2023-10-10 DIAGNOSIS — E66.9 NON MORBID OBESITY, UNSPECIFIED OBESITY TYPE: ICD-10-CM

## 2023-10-10 PROCEDURE — G0108 DIAB MANAGE TRN  PER INDIV: HCPCS | Mod: 95,,, | Performed by: FAMILY MEDICINE

## 2023-10-10 PROCEDURE — 1159F MED LIST DOCD IN RCRD: CPT | Mod: CPTII,95,, | Performed by: NURSE PRACTITIONER

## 2023-10-10 PROCEDURE — 1160F RVW MEDS BY RX/DR IN RCRD: CPT | Mod: CPTII,95,, | Performed by: NURSE PRACTITIONER

## 2023-10-10 PROCEDURE — G0108 PR DIAB MANAGE TRN  PER INDIV: ICD-10-PCS | Mod: 95,,, | Performed by: FAMILY MEDICINE

## 2023-10-10 PROCEDURE — 99214 PR OFFICE/OUTPT VISIT, EST, LEVL IV, 30-39 MIN: ICD-10-PCS | Mod: 95,,, | Performed by: NURSE PRACTITIONER

## 2023-10-10 PROCEDURE — 1159F PR MEDICATION LIST DOCUMENTED IN MEDICAL RECORD: ICD-10-PCS | Mod: CPTII,95,, | Performed by: NURSE PRACTITIONER

## 2023-10-10 PROCEDURE — 99214 OFFICE O/P EST MOD 30 MIN: CPT | Mod: 95,,, | Performed by: NURSE PRACTITIONER

## 2023-10-10 PROCEDURE — 1160F PR REVIEW ALL MEDS BY PRESCRIBER/CLIN PHARMACIST DOCUMENTED: ICD-10-PCS | Mod: CPTII,95,, | Performed by: NURSE PRACTITIONER

## 2023-10-10 PROCEDURE — 3051F PR MOST RECENT HEMOGLOBIN A1C LEVEL 7.0 - < 8.0%: ICD-10-PCS | Mod: CPTII,95,, | Performed by: NURSE PRACTITIONER

## 2023-10-10 PROCEDURE — 3051F HG A1C>EQUAL 7.0%<8.0%: CPT | Mod: CPTII,95,, | Performed by: NURSE PRACTITIONER

## 2023-10-10 RX ORDER — DULAGLUTIDE 0.75 MG/.5ML
0.75 INJECTION, SOLUTION SUBCUTANEOUS
Qty: 4 PEN | Refills: 3 | Status: SHIPPED | OUTPATIENT
Start: 2023-10-10 | End: 2024-01-10 | Stop reason: SDUPTHER

## 2023-10-10 NOTE — PATIENT INSTRUCTIONS
Discussed increasing Trulicity dose - pt opted to wait until next visit and will focus on resuming exercise regimen.   Test glucose 1x/day - pre-meal/2 hours after meals. Reviewed glycemic goals.   Return to clinic in 3 months with labs prior.

## 2023-10-10 NOTE — PROGRESS NOTES
Diabetes Care Specialist Virtual Visit Note    The patient location is: [place]  The chief complaint leading to consultation is: Diabetes  Visit type: audiovisual  Total time spent with patient: 30 min   Each patient to whom he or she provides medical services by telemedicine is:  (1) informed of the relationship between the physician and patient and the respective role of any other health care provider with respect to management of the patient; and (2) notified that he or she may decline to receive medical services by telemedicine and may withdraw from such care at any time.       Diabetes Care Specialist Progress Note  Author: Sarah Reyna RN  Date: 10/10/2023    Program Intake  Reason for Diabetes Program Visit:: Intervention  Type of Intervention:: Individual  Individual: Education  Education: Self-Management Skill Review, Nutrition and Meal Planning  Current diabetes risk level:: moderate  In the last 12 months, have you:: used emergency room services  Was the ER or hospital admission related to diabetes?: No  Permission to speak with others about care:: yes    Lab Results   Component Value Date    HGBA1C 7.0 (H) 10/03/2023       Clinical  Problem Review  Active comorbidities affecting diabetes self-care.: no    Clinical Assessment  Current Diabetes Treatment: Injectable (Trulicity 0.75 mg weekly)    Medication Information  How do you obtain your medications?: Patient drives  How many days a week do you miss your medications?: Never  Do you sometimes have difficulty refilling your medications?: No  Medication adherence impacting ability to self-manage diabetes?: No    Labs  Do you have regular lab work to monitor your medications?: Yes  Type of Regular Lab Work: A1c  Where do you get your labs drawn?: [facility]  Lab Compliance Barriers: No    Nutritional Status  Diet: Regular  Meal Plan 24 Hour Recall:  (Pt has cut back carb intake. Eats more vegetables + protein at meal time.)  Change in appetite?:  No  Recent Changes in Weight: No Recent Weight Change  Current nutritional status an area of need that is impacting patient's ability to self-manage diabetes?: No    Additional Social History  Support  Does anyone support you with your diabetes care?: yes  Who supports you?: son/daughter  Who takes you to your medical appointments?: self  Does the current support meet the patient's needs?: Yes  Is Support an area impacting ability to self-manage diabetes?: No    Access to Mass Media & Technology  Does the patient have access to any of the following devices or technologies?: Smart phone  Media or technology needs impacting ability to self-manage diabetes?: No    Cognitive/Behavioral Health  Alert and Oriented: Yes  Difficulty Thinking: No  Requires Prompting: No  Requires assistance for routine expression?: No  Cognitive or behavioral barriers impacting ability to self-manage diabetes?: No    Culture/Oriental orthodox  Culture or Mandaen beliefs that may impact ability to access healthcare: No    Communication  Language preference: English  Hearing Problems: No  Vision Problems: Yes  Vision problem type:: Decreased Vision  Vision Assistance: Glasses  Communication needs impacting ability to self-manage diabetes?: No    Health Literacy  Preferred Learning Method: Face to Face, Reading Materials  How often do you need to have someone help you read instructions, pamphlets, or written material from your doctor or pharmacy?: Never  Health literacy needs impacting ability to self-manage diabetes?: No      Diabetes Self-Management Skills Assessment  Diabetes Disease Process/Treatment Options  Diabetes Disease Process/Treatment Options: Skills Assessment Completed: No  Area of need?: No    Nutrition/Healthy Eating  Challenges to healthy eating:: portion control  Method of carbohydrate measurement:: portion plate  Patient can identify foods that impact blood sugar.: yes  Patient-identified foods:: fruit/fruit juice, milk, soda,  starchy vegetables (corn, peas, beans), starches (bread, pasta, rice, cereal), sweets  Nutrition/Healthy Eating Skills Assessment Completed:: Yes  Assessment indicates:: Instruction Needed, Knowledge deficit  Area of need?: Yes    Physical Activity/Exercise  Patient's daily activity level:: lightly active  Patient formally exercises outside of work.: yes  Exercise Type: walking  Intensity: Low  Stated forms of physical activity:: any movement performed by muscles that uses energy  Patient can identify reasons why exercise/physical activity is important in diabetes management.: no  Physical Activity/Exercise Skills Assessment Completed: : Yes  Assessment indicates:: Instruction Needed  Area of need?: Yes    Medications  Medication Skills Assessment Completed:: No  Area of need?: No    Home Blood Glucose Monitoring  Patient states that blood sugar is checked at home daily.: yes  Monitoring Method:: home glucometer  Home glucometer meter type:: Insurance Preferred Meter  How often do you check your blood sugar?: Once a day  When do you check your blood sugar?: Before breakfast  When you check what is your typical blood sugar range? : 130  Blood glucose logs:: no, encouraged to keep logs, encouraged to bring logs to provider visits  Blood glucose logs reviewed today?: no  Home Blood Glucose Monitoring Skills Assessment Completed: : Yes  Assessment indicates:: Instruction Needed, Knowledge deficit  Area of need?: Yes    Acute Complications  Acute Complications Skills Assessment Completed: : No  Area of need?: No    Chronic Complications  Chronic Complications Skills Assessment Completed: : No  Deferred due to:: Time    Psychosocial/Coping  Psychosocial/Coping Skills Assessment Completed: : No  Area of need?: No      Assessment Summary and Plan    Based on today's diabetes care assessment, the following areas of need were identified:          10/10/2023    12:05 AM   Social   Support No   Access to [employer]/Tech No    Cognitive/Behavioral Health No   Culture/Anabaptist No   Communication No   Health Literacy No            10/10/2023    12:05 AM   Clinical   Medication Adherence No   Lab Compliance No   Nutritional Status No            10/10/2023    12:05 AM   Diabetes Self-Management Skills   Diabetes Disease Process/Treatment Options No   Nutrition/Healthy Eating Yes- see care planning   Physical Activity/Exercise Yes- Pt to start exercising and increase as tolerated. Discussed benefits of exercise as it relates to insulin resistance and weight loss.   Medication No   Home Blood Glucose Monitoring Yes- Discussed BS goals and importance of monitoring and recording BS for review and maintenance of medication.   Acute Complications No   Psychosocial/Coping No      Today's interventions were provided through individual discussion, instruction, and written materials were provided.      Patient verbalized understanding of instruction and written materials.  Pt was able to return back demonstration of instructions today. Patient understood key points, needs reinforcement and further instruction.     Diabetes Self-Management Care Plan:    Today's Diabetes Self-Management Care Plan was developed with Machelle's input. Machelle has agreed to work toward the following goal(s) to improve his/her overall diabetes control.      Care Plan: Diabetes Management   Updates made since 9/10/2023 12:00 AM        Problem: Healthy Eating         Goal: Eat 2-3 meals daily with 30-45g/2-3 servings of Carbohydrate per meal. Limit snacking in between meal to 1 serving (15 grams).    Start Date: 7/27/2023   Expected End Date: 8/18/2023   This Visit's Progress: On track   Recent Progress: No change   Priority: High   Barriers: No Barriers Identified   Note:    7/27/23 - - We concentrated on portion sizes at today's session. Overall meal planning and various choices for meals. Discussed carb vs non-carb foods, appropriate amount of carbs to have at meals/snacks  and acceptable serving sizes of individual carb items. Obtained a 24-hr food recall from patient. Discussed various meal plan options to promote healthy eating. Pt says since May, she's cut out snacking a lot and sweets like apple fritters etc.     - - Practiced reading food labels on various food items with patient focusing on serving size and total carbohydrate intake (not sugar intake). Discussed having lean protein at all meals and adding non starchy vegetables at lunch and dinner. Instructed pt to aim for 2-3 evenly spaced meals or a small carb snack in place of a missed meal. Written education information provided to patient for use at home. Pt verbalized understanding of all the above.    Care Plan Update 8/18/23 - - Pt says she hasn't been eating much and she's on Trulicity. Says she's eyeballing her portion sizes and not monitoring. Reminded pt of appropriate amount of carbs to have at meals/snacks and acceptable serving sizes of individual carb items. Also discussed carb sources of foods and having lean protein at all meals and non starchy vegetables at lunch and dinner. Pt verbalized understanding.     Care Plan Update 9/22/23 - - Pt was confused about serving sizes and sources of carbohydrate foods. We discussed in details carb vs non-carb foods, appropriate amount of carbs to have at meals/snacks and acceptable serving sizes of individual carb items. We reviewed reading food labels on various food items with patient focusing on serving size and total carbohydrate intake (not sugar intake). Encouraged pt to aim for 3 evenly spaced meals or a small carb snack in place of a missed meal. Pt verbalized understanding.     Care Plan Update 10/10/23 - -  Pt has cut back carb intake. She eats more vegetables + protein at meal time. We reviewed in details carb sources of foods, appropriate amount of carbs to have at meals/snacks and acceptable serving sizes of individual carb items. Encouraged pt to continue  monitoring portion sizes; may use hand method and/or plate method for accurate serving sizes.  Pt verbalized understanding.        Follow Up Plan     No follow-ups on file.    Today's care plan and follow up schedule was discussed with patient.  Machelle verbalized understanding of the care plan, goals, and agrees to follow up plan.        The patient was encouraged to communicate with his/her health care provider/physician and care team regarding his/her condition(s) and treatment.  I provided the patient with my contact information today and encouraged to contact me via phone or [facility] Patient Portal as needed.     Length of Visit   Total Time: 30 Minutes

## 2023-10-10 NOTE — PROGRESS NOTES
CC: This 63 y.o. Black or  female  is here for evaluation of  T2DM along with comorbidities indicated in the Visit Diagnosis section of this encounter.    HPI: Machelle Morin was diagnosed with T2DM in May 2023. Glyburide started at the time of diagnosis.     DM COMPLICATIONS: none    Initial visit 7/19/23  New to Endocrine. Referred by Dr. Saucedo her PCP.   Pt has lost 12 lb since May when she was dx'd with DM. Has cut down significantly on her carb intake.   Pt had messaged Dr. Saucedo that she did not want weekly injection. However, she does not recall this message and she actually is interested in Trulicity as her  brother-in-law has done well on it.   Does not want metformin as she believes it damaged her 's kidneys.   Plan   Increase intensity of physical activity as tolerated - discussed dumbbells and brisk walking.   Cut back on coffee for both glycemic and blood pressure.   See Diabetes Education for comprehensive education and injection training.   Discussed oral medication options including metformin (not nephrotoxic, risk of lactic acidosis). Pt definitely does not want metformin.   Start Trulicity 0.75 mg once weekly. Potential side effects: nausea, diarrhea, constipation, bloating. Nausea for the first week or two is common.  Avoid big food portions and greasy/heavy foods.   Stop glyburide when Trulicity is started.   Test glucose 1-2x/day. Declines personal CGM today.   Return to clinic in 3 months with a1c prior. A1c today.       Interval hx virtual visit   A1c is down from 8.3 to 7%.   She has stopped glyburide and started Trulicity. C/o nausea for 1 day after Trulicity injection. Appetite lower, reports she has lost weight but does not weigh herself.   She has found visits with diabetes educator helpful.       LAST DIABETES EDUCATION: July, Aug, Sept 2023 with ANSON Reyna RN     HOSPITALIZED FOR DIABETES OR RELATED COMPLICATIONS -  No.    SIGNIFICANT DIABETES MED HISTORY:  n/a     PRESCRIBED DIABETES MEDICATIONS:   Trulicity 0.75 mg weekly on        Misses medication doses - No        SELF MONITORING BLOOD GLUCOSE: Monitors blood glucose at home 1x/day at different times of day. No logs. Recalls: 106-150, mostly 130-140s        HYPOGLYCEMIC EPISODES: none      CURRENT DIET: drinks coffee with creamer and Truvia - 1 tumbler per day, sometimes 2, which is an improvement.  drinks water and occ diet sprite.   Breakfast - usually skips breakfast, only has coffee   Lunch - salad.   Dinner - veggies and protein   Snacks - almonds or pecans.   Has stayed with her diet. Avoids ice cream.     Diet recall: breakfast was coffee; supper was cabbage and baked chicken, [employer] Luc[employer] SF, lunch was salad.       CURRENT EXERCISE: none recent; previously: walks in neighborhood 1/2 hour daily.     SOCIAL:   had DM       There were no vitals taken for this visit.    ROS:   CONSTITUTIONAL: Appetite good, denies fatigue  GI: + nausea as above     PHYSICAL EXAM:  GENERAL: Well developed, well nourished. No acute distress.   PSYCH: AAOx3, appropriate mood and affect, conversant, well-groomed. Judgement and insight good.   NEURO: Cranial nerves grossly intact. Speech clear.   CHEST: Respirations even and unlabored.           Hemoglobin A1C   Date Value Ref Range Status   10/03/2023 7.0 (H) 4.0 - 5.6 % Final     Comment:     ADA Screening Guidelines:  5.7-6.4%  Consistent with prediabetes  >or=6.5%  Consistent with diabetes    High levels of fetal hemoglobin interfere with the HbA1C  assay. Heterozygous hemoglobin variants (HbS, HgC, etc)do  not significantly interfere with this assay.   However, presence of multiple variants may affect accuracy.     2023 8.3 (H) 4.0 - 5.6 % Final     Comment:     ADA Screening Guidelines:  5.7-6.4%  Consistent with prediabetes  >or=6.5%  Consistent with diabetes    High levels of fetal hemoglobin interfere with the HbA1C  assay. Heterozygous  "hemoglobin variants (HbS, HgC, etc)do  not significantly interfere with this assay.   However, presence of multiple variants may affect accuracy.     05/11/2023 11.8 (H) 4.0 - 5.6 % Final     Comment:     ADA Screening Guidelines:  5.7-6.4%  Consistent with prediabetes  >or=6.5%  Consistent with diabetes    High levels of fetal hemoglobin interfere with the HbA1C  assay. Heterozygous hemoglobin variants (HbS, HgC, etc)do  not significantly interfere with this assay.   However, presence of multiple variants may affect accuracy.         No results found for: "CPEPTIDE", "GLUTAMICACID", "ISLETCELLANT", "FRUCTOSAMINE"     Lab Results   Component Value Date    CHOL 259 (H) 05/11/2023     Lab Results   Component Value Date    HDL 95 (H) 05/11/2023     Lab Results   Component Value Date    LDLCALC 149.0 05/11/2023     Lab Results   Component Value Date    TRIG 75 05/11/2023     Lab Results   Component Value Date    CHOLHDL 36.7 05/11/2023         Component Value Date/Time     05/11/2023 0920    K 4.3 05/11/2023 0920     05/11/2023 0920    CO2 28 05/11/2023 0920    BUN 18 05/11/2023 0920    CREATININE 0.9 05/11/2023 0920     (H) 05/11/2023 0920    CALCIUM 8.7 05/11/2023 0920    ALKPHOS 73 05/11/2023 0920    AST 21 05/11/2023 0920    ALT 17 05/11/2023 0920    BILITOT 0.5 05/11/2023 0920    EGFRNORACEVR >60.0 05/11/2023 0920         No results found for: "LABMICR", "CREATRANDUR", "MICALBCREAT"          ASSESSMENT and PLAN:    A1C GOAL: < 7 %       1. Type 2 diabetes mellitus with hyperglycemia, without long-term current use of insulin  Discussed increasing Trulicity dose - pt opted to wait until next visit and will focus on resuming exercise regimen.   Test glucose 1x/day - pre-meal/2 hours after meals. Reviewed glycemic goals.   Return to clinic in 3 months with labs prior. - virtual       dulaglutide (TRULICITY) 0.75 mg/0.5 mL pen injector    Hemoglobin A1C      2. Non morbid obesity, unspecified obesity " type  dulaglutide (TRULICITY) 0.75 mg/0.5 mL pen injector      3. Hyperlipidemia, unspecified hyperlipidemia type  Lipid Panel        Orders Placed This Encounter   Procedures    Hemoglobin A1C     Standing Status:   Future     Standing Expiration Date:   12/8/2024    Lipid Panel     Standing Status:   Future     Standing Expiration Date:   10/9/2024        Follow up in about 3 months (around 1/10/2024) for Virtual Visit.       The patient location is: [place]  The chief complaint leading to consultation is: type 2 diabetes     Visit type: audiovisual    Face to Face time with patient: 19 minutes of total time spent on the encounter, which includes face to face time and non-face to face time preparing to see the patient (eg, review of tests), Obtaining and/or reviewing separately obtained history, Documenting clinical information in the electronic or other health record, Independently interpreting results (not separately reported) and communicating results to the patient/family/caregiver, or Care coordination (not separately reported).         Each patient to whom he or she provides medical services by telemedicine is:  (1) informed of the relationship between the physician and patient and the respective role of any other health care provider with respect to management of the patient; and (2) notified that he or she may decline to receive medical services by telemedicine and may withdraw from such care at any time.    Notes:

## 2023-11-14 ENCOUNTER — OFFICE VISIT (OUTPATIENT)
Dept: FAMILY MEDICINE | Facility: CLINIC | Age: 63
End: 2023-11-14
Payer: COMMERCIAL

## 2023-11-14 VITALS
TEMPERATURE: 98 F | SYSTOLIC BLOOD PRESSURE: 140 MMHG | OXYGEN SATURATION: 97 % | BODY MASS INDEX: 31.33 KG/M2 | DIASTOLIC BLOOD PRESSURE: 100 MMHG | WEIGHT: 188.25 LBS | HEART RATE: 89 BPM

## 2023-11-14 DIAGNOSIS — E78.49 OTHER HYPERLIPIDEMIA: ICD-10-CM

## 2023-11-14 DIAGNOSIS — Z76.89 ENCOUNTER TO ESTABLISH CARE WITH NEW DOCTOR: ICD-10-CM

## 2023-11-14 DIAGNOSIS — F33.0 MAJOR DEPRESSIVE DISORDER, RECURRENT, MILD: ICD-10-CM

## 2023-11-14 DIAGNOSIS — I10 BENIGN ESSENTIAL HTN: Primary | ICD-10-CM

## 2023-11-14 DIAGNOSIS — Z23 INFLUENZA VACCINE NEEDED: ICD-10-CM

## 2023-11-14 DIAGNOSIS — Z11.4 SCREENING FOR HIV (HUMAN IMMUNODEFICIENCY VIRUS): ICD-10-CM

## 2023-11-14 DIAGNOSIS — E11.65 TYPE 2 DIABETES MELLITUS WITH HYPERGLYCEMIA, WITHOUT LONG-TERM CURRENT USE OF INSULIN: ICD-10-CM

## 2023-11-14 DIAGNOSIS — G47.09 OTHER INSOMNIA: ICD-10-CM

## 2023-11-14 LAB
ALBUMIN/CREAT UR: 2426.3 UG/MG (ref 0–30)
CREAT UR-MCNC: 80 MG/DL (ref 15–325)
MICROALBUMIN UR DL<=1MG/L-MCNC: 1941 UG/ML

## 2023-11-14 PROCEDURE — 1159F MED LIST DOCD IN RCRD: CPT | Mod: CPTII,S$GLB,, | Performed by: INTERNAL MEDICINE

## 2023-11-14 PROCEDURE — 90686 IIV4 VACC NO PRSV 0.5 ML IM: CPT | Mod: S$GLB,,, | Performed by: INTERNAL MEDICINE

## 2023-11-14 PROCEDURE — 3008F PR BODY MASS INDEX (BMI) DOCUMENTED: ICD-10-PCS | Mod: CPTII,S$GLB,, | Performed by: INTERNAL MEDICINE

## 2023-11-14 PROCEDURE — 90471 FLU VACCINE (QUAD) GREATER THAN OR EQUAL TO 3YO PRESERVATIVE FREE IM: ICD-10-PCS | Mod: S$GLB,,, | Performed by: INTERNAL MEDICINE

## 2023-11-14 PROCEDURE — 1160F RVW MEDS BY RX/DR IN RCRD: CPT | Mod: CPTII,S$GLB,, | Performed by: INTERNAL MEDICINE

## 2023-11-14 PROCEDURE — 4010F ACE/ARB THERAPY RXD/TAKEN: CPT | Mod: CPTII,S$GLB,, | Performed by: INTERNAL MEDICINE

## 2023-11-14 PROCEDURE — 90471 IMMUNIZATION ADMIN: CPT | Mod: S$GLB,,, | Performed by: INTERNAL MEDICINE

## 2023-11-14 PROCEDURE — 3051F HG A1C>EQUAL 7.0%<8.0%: CPT | Mod: CPTII,S$GLB,, | Performed by: INTERNAL MEDICINE

## 2023-11-14 PROCEDURE — 3080F DIAST BP >= 90 MM HG: CPT | Mod: CPTII,S$GLB,, | Performed by: INTERNAL MEDICINE

## 2023-11-14 PROCEDURE — 1160F PR REVIEW ALL MEDS BY PRESCRIBER/CLIN PHARMACIST DOCUMENTED: ICD-10-PCS | Mod: CPTII,S$GLB,, | Performed by: INTERNAL MEDICINE

## 2023-11-14 PROCEDURE — 3077F PR MOST RECENT SYSTOLIC BLOOD PRESSURE >= 140 MM HG: ICD-10-PCS | Mod: CPTII,S$GLB,, | Performed by: INTERNAL MEDICINE

## 2023-11-14 PROCEDURE — 99204 OFFICE O/P NEW MOD 45 MIN: CPT | Mod: 25,S$GLB,, | Performed by: INTERNAL MEDICINE

## 2023-11-14 PROCEDURE — 90686 FLU VACCINE (QUAD) GREATER THAN OR EQUAL TO 3YO PRESERVATIVE FREE IM: ICD-10-PCS | Mod: S$GLB,,, | Performed by: INTERNAL MEDICINE

## 2023-11-14 PROCEDURE — 99999 PR PBB SHADOW E&M-EST. PATIENT-LVL III: CPT | Mod: PBBFAC,,, | Performed by: INTERNAL MEDICINE

## 2023-11-14 PROCEDURE — 3051F PR MOST RECENT HEMOGLOBIN A1C LEVEL 7.0 - < 8.0%: ICD-10-PCS | Mod: CPTII,S$GLB,, | Performed by: INTERNAL MEDICINE

## 2023-11-14 PROCEDURE — 4010F PR ACE/ARB THEARPY RXD/TAKEN: ICD-10-PCS | Mod: CPTII,S$GLB,, | Performed by: INTERNAL MEDICINE

## 2023-11-14 PROCEDURE — 1159F PR MEDICATION LIST DOCUMENTED IN MEDICAL RECORD: ICD-10-PCS | Mod: CPTII,S$GLB,, | Performed by: INTERNAL MEDICINE

## 2023-11-14 PROCEDURE — 3077F SYST BP >= 140 MM HG: CPT | Mod: CPTII,S$GLB,, | Performed by: INTERNAL MEDICINE

## 2023-11-14 PROCEDURE — 3008F BODY MASS INDEX DOCD: CPT | Mod: CPTII,S$GLB,, | Performed by: INTERNAL MEDICINE

## 2023-11-14 PROCEDURE — 82043 UR ALBUMIN QUANTITATIVE: CPT | Performed by: INTERNAL MEDICINE

## 2023-11-14 PROCEDURE — 99999 PR PBB SHADOW E&M-EST. PATIENT-LVL III: ICD-10-PCS | Mod: PBBFAC,,, | Performed by: INTERNAL MEDICINE

## 2023-11-14 PROCEDURE — 3080F PR MOST RECENT DIASTOLIC BLOOD PRESSURE >= 90 MM HG: ICD-10-PCS | Mod: CPTII,S$GLB,, | Performed by: INTERNAL MEDICINE

## 2023-11-14 PROCEDURE — 99204 PR OFFICE/OUTPT VISIT, NEW, LEVL IV, 45-59 MIN: ICD-10-PCS | Mod: 25,S$GLB,, | Performed by: INTERNAL MEDICINE

## 2023-11-14 RX ORDER — ATORVASTATIN CALCIUM 40 MG/1
40 TABLET, FILM COATED ORAL DAILY
Qty: 90 TABLET | Refills: 3 | Status: SHIPPED | OUTPATIENT
Start: 2023-11-14 | End: 2024-11-13

## 2023-11-14 RX ORDER — LOSARTAN POTASSIUM 25 MG/1
25 TABLET ORAL DAILY
Qty: 30 TABLET | Refills: 0 | Status: SHIPPED | OUTPATIENT
Start: 2023-11-14 | End: 2023-11-22 | Stop reason: SDUPTHER

## 2023-11-14 RX ORDER — TRAZODONE HYDROCHLORIDE 50 MG/1
50 TABLET ORAL NIGHTLY
Qty: 30 TABLET | Refills: 0 | Status: SHIPPED | OUTPATIENT
Start: 2023-11-14 | End: 2024-01-05

## 2023-11-14 NOTE — ASSESSMENT & PLAN NOTE
Chronic, uncontrolled. Refer to HPI     - will start losartan 25mg daily. Will reach out in one week about BP.   - counseled patient about lifestyle changes and to continue her good habits

## 2023-11-14 NOTE — PROGRESS NOTES
Patient presents to clinic today to establish as a new patient.     Chief Complaint: Establish Care (Diabetic, hypertension)      Machelle Morin  is a 63 y.o. year old with a PMH of  has a past medical history of Diabetes mellitus.     PCP not covered by insurance hence patient is changing providers    HTN: BP at home have been 160/102, 144/86, 155/90,130/89 (measured in the morning). Has a low salt diet and does eat a good amount of fruit and vegetables.     DM: takes Trulicity on , has GI discomfort till Tuesday. Has no issues today. A1c has improved significantly.     Insomnia: no problems falling asleep but has issues staying asleep. Takes 10mg ambien. Ambien helps stay asleep. Has not tried trazodone.     Scheduled for colonoscopy on [place] 3.     Social hx: Patient does not smoke cigarettes or vape. Patient does drink alcoholic beverages infrequently. Patient's diet is good, but does eat rice and pasta. Works as a teacher.     Past Surgical History:   Procedure Laterality Date     SECTION      ENDOMETRIAL ABLATION      OVARIAN CYST REMOVAL          Family History   Problem Relation Age of Onset    Alcohol abuse Father     Miscarriages / Stillbirths Mother     Hypertension Mother     Cancer Mother     Arthritis Mother     Pancreatic cancer Mother 83    Early death Brother     Early death Brother     Miscarriages / Stillbirths Sister     Asthma Sister     Asthma Daughter     Colon cancer Neg Hx     Diabetes Neg Hx     Stroke Neg Hx         Social History     Socioeconomic History    Marital status:     Number of children: 1   Tobacco Use    Smoking status: Never     Passive exposure: Never    Smokeless tobacco: Never   Substance and Sexual Activity    Alcohol use: Yes     Alcohol/week: 1.0 standard drink of alcohol     Types: 1 Glasses of wine per week    Drug use: Never    Sexual activity: Not Currently     Partners: Male     Comment:  since  after 38 years marriage      Social Determinants of Health     Financial Resource Strain: Low Risk  (11/11/2023)    Overall Financial Resource Strain (CARDIA)     Difficulty of Paying Living Expenses: Not very hard   Food Insecurity: No Food Insecurity (11/11/2023)    Hunger Vital Sign     Worried About Running Out of Food in the Last Year: Never true     Ran Out of Food in the Last Year: Never true   Transportation Needs: No Transportation Needs (11/11/2023)    PRAPARE - Transportation     Lack of Transportation (Medical): No     Lack of Transportation (Non-Medical): No   Physical Activity: Inactive (11/11/2023)    Exercise Vital Sign     Days of Exercise per Week: 0 days     Minutes of Exercise per Session: 0 min   Stress: Stress Concern Present (11/11/2023)    [employer] [place] - Occupational Stress Questionnaire     Feeling of Stress : Very much   Social Connections: Unknown (11/11/2023)    Social Connection and Isolation Panel [NHANES]     Frequency of Communication with Friends and Family: More than three times a week     Frequency of Social Gatherings with Friends and Family: More than three times a week     Active Member of Clubs or Organizations: Yes     Attends Club or Organization Meetings: More than 4 times per year     Marital Status:    Housing Stability: Unknown (11/11/2023)    Housing Stability Vital Sign     Unable to Pay for Housing in the Last Year: Patient refused     Number of Places Lived in the Last Year: 1     Unstable Housing in the Last Year: No         Current Outpatient Medications:     ALPRAZolam (XANAX) 0.25 MG tablet, Take 0.25 mg by mouth daily as needed for Anxiety., Disp: , Rfl:     blood sugar diagnostic Strp, To check BG 2 times daily, to use with insurance preferred meter, Disp: 100 each, Rfl: 2    blood-glucose meter kit, To check BG 2 times daily, to use with insurance preferred meter, Disp: 1 each, Rfl: 0    dulaglutide (TRULICITY) 0.75 mg/0.5 mL pen injector, Inject 0.75  mg into the skin every 7 days., Disp: 4 pen , Rfl: 3    EScitalopram oxalate (LEXAPRO) 20 MG tablet, Lexapro 20 mg tablet  Take 1 tablet every day by oral route., Disp: , Rfl:     ibuprofen (ADVIL,MOTRIN) 800 MG tablet, Take 1 tablet (800 mg total) by mouth 3 (three) times daily as needed for Pain., Disp: 60 tablet, Rfl: 1    lancets Misc, To check BG 2 times daily, to use with insurance preferred meter, Disp: 100 each, Rfl: 2    zolpidem (AMBIEN) 10 mg Tab, Ambien 10 mg tablet  Take 1 tablet every day by oral route., Disp: , Rfl:     atorvastatin (LIPITOR) 40 MG tablet, Take 1 tablet (40 mg total) by mouth once daily., Disp: 90 tablet, Rfl: 3    losartan (COZAAR) 25 MG tablet, Take 1 tablet (25 mg total) by mouth once daily., Disp: 30 tablet, Rfl: 0    traZODone (DESYREL) 50 MG tablet, Take 1 tablet (50 mg total) by mouth every evening., Disp: 30 tablet, Rfl: 0     Review of Systems   Eyes:  Positive for blurred vision.   Respiratory:  Negative for shortness of breath.    Cardiovascular:  Positive for leg swelling. Negative for chest pain.   Gastrointestinal:  Positive for nausea. Negative for abdominal pain.   Neurological:  Positive for tingling (when legs swollen).   Psychiatric/Behavioral:  Positive for memory loss. The patient has insomnia.         Objective:      Vitals:    11/14/23 0839   BP: (!) 140/100   Pulse:    Temp:        Physical Exam  Vitals and nursing note reviewed.   Constitutional:       Appearance: Normal appearance.   HENT:      Head: Normocephalic and atraumatic.   Cardiovascular:      Rate and Rhythm: Normal rate and regular rhythm.   Pulmonary:      Effort: Pulmonary effort is normal.      Breath sounds: Normal breath sounds. No wheezing or rales.   Abdominal:      Palpations: Abdomen is soft.      Tenderness: There is no abdominal tenderness.   Musculoskeletal:      Right lower leg: No edema.      Left lower leg: No edema.   Skin:     General: Skin is warm and dry.   Neurological:       General: No focal deficit present.      Mental Status: She is alert and oriented to person, place, and time.   Psychiatric:         Mood and Affect: Mood normal.         Behavior: Behavior normal.          Protective Sensation (w/ 10 gram monofilament):  Right: Intact  Left: Intact    Visual Inspection:  Normal -  Bilateral    Pedal Pulses:   Right: Present  Left: Present    Posterior Tibialis Pulses:   Right:Absent  Left: Absent      Assessment:       1. Benign essential HTN    2. Type 2 diabetes mellitus with hyperglycemia, without long-term current use of insulin    3. Encounter to establish care with new doctor    4. Major depressive disorder, recurrent, mild    5. Other hyperlipidemia    6. Other insomnia    7. Influenza vaccine needed    8. Screening for HIV (human immunodeficiency virus)          Plan:   1. Benign essential HTN  Assessment & Plan:  Chronic, uncontrolled. Refer to HPI     - will start losartan 25mg daily. Will reach out in one week about BP.   - counseled patient about lifestyle changes and to continue her good habits     Orders:  -     Cancel: Microalbumin/Creatinine Ratio, Urine; Future; Expected date: 11/14/2023  -     losartan (COZAAR) 25 MG tablet; Take 1 tablet (25 mg total) by mouth once daily.  Dispense: 30 tablet; Refill: 0  -     Microalbumin/Creatinine Ratio, Urine    2. Type 2 diabetes mellitus with hyperglycemia, without long-term current use of insulin  Assessment & Plan:  Chronic, improving. Last A1c was 7% down from 11.8%. refer to hpi     - continue trulicity     Orders:  -     atorvastatin (LIPITOR) 40 MG tablet; Take 1 tablet (40 mg total) by mouth once daily.  Dispense: 90 tablet; Refill: 3  -     Cancel: Microalbumin/Creatinine Ratio, Urine; Future; Expected date: 11/14/2023  -     Microalbumin/Creatinine Ratio, Urine  -     Hemoglobin A1C; Future; Expected date: 02/14/2024    3. Encounter to establish care with new doctor  Assessment & Plan:  Previous provider not covered  by [employer]. Reviewed medical, surgical, family and social history. Discussed HCM. Agreed to flu vaccine, shingles at next follow up.         4. Major depressive disorder, recurrent, mild  Assessment & Plan:  Chronic, controlled on lexapro.   Takes xanax only when needed    - continue for now.       5. Other hyperlipidemia  Assessment & Plan:  Total cholesterol was 260s this year.     - still start lipitor 40mg daily       6. Other insomnia  Assessment & Plan:  Issues staying asleep. Refer to HPI     - counseled patient about sleep hygiene habits. Will start trazodone and follow up with sx. If unresolved, will refill ambien.     Orders:  -     traZODone (DESYREL) 50 MG tablet; Take 1 tablet (50 mg total) by mouth every evening.  Dispense: 30 tablet; Refill: 0    7. Influenza vaccine needed  -     Influenza - Quadrivalent (PF)    8. Screening for HIV (human immunodeficiency virus)  -     HIV 1/2 Ag/Ab (4th Gen); Future; Expected date: 02/14/2024         Follow up in about 3 months (around 2/14/2024) for HTN and Dm .

## 2023-11-14 NOTE — PROGRESS NOTES
Health Maintenance Due   Topic     Diabetes Urine Screening  Consult pcp    Foot Exam  Consult pcp    HIV Screening  Consult pcp    Low Dose Statin  Consult pcp    Colorectal Cancer Screening  Consult pcp    Shingles Vaccine (1 of 2)     RSV Vaccine (Age 60+) (1 - 1-dose 60+ series) Not offered at this office    Influenza Vaccine (1)     COVID-19 Vaccine (4 - 2023-24 season) Not offered at this office

## 2023-11-14 NOTE — ASSESSMENT & PLAN NOTE
Previous provider not covered by [employer]. Reviewed medical, surgical, family and social history. Discussed HCM. Agreed to flu vaccine, shingles at next follow up.

## 2023-11-14 NOTE — ASSESSMENT & PLAN NOTE
Issues staying asleep. Refer to HPI     - counseled patient about sleep hygiene habits. Will start trazodone and follow up with sx. If unresolved, will refill ambien.

## 2023-11-22 ENCOUNTER — PATIENT MESSAGE (OUTPATIENT)
Dept: FAMILY MEDICINE | Facility: CLINIC | Age: 63
End: 2023-11-22
Payer: COMMERCIAL

## 2023-11-22 DIAGNOSIS — I10 BENIGN ESSENTIAL HTN: ICD-10-CM

## 2023-11-22 RX ORDER — LOSARTAN POTASSIUM 50 MG/1
50 TABLET ORAL DAILY
Qty: 60 TABLET | Refills: 2 | Status: SHIPPED | OUTPATIENT
Start: 2023-11-22 | End: 2024-01-05

## 2023-12-05 ENCOUNTER — TELEPHONE (OUTPATIENT)
Dept: OBSTETRICS AND GYNECOLOGY | Facility: CLINIC | Age: 63
End: 2023-12-05
Payer: COMMERCIAL

## 2023-12-28 ENCOUNTER — TELEPHONE (OUTPATIENT)
Dept: FAMILY MEDICINE | Facility: CLINIC | Age: 63
End: 2023-12-28

## 2024-01-03 ENCOUNTER — LAB VISIT (OUTPATIENT)
Dept: LAB | Facility: HOSPITAL | Age: 64
End: 2024-01-03
Attending: NURSE PRACTITIONER
Payer: COMMERCIAL

## 2024-01-03 DIAGNOSIS — E11.65 TYPE 2 DIABETES MELLITUS WITH HYPERGLYCEMIA, WITHOUT LONG-TERM CURRENT USE OF INSULIN: ICD-10-CM

## 2024-01-03 DIAGNOSIS — E78.5 HYPERLIPIDEMIA, UNSPECIFIED HYPERLIPIDEMIA TYPE: ICD-10-CM

## 2024-01-03 LAB
CHOLEST SERPL-MCNC: 214 MG/DL (ref 120–199)
CHOLEST/HDLC SERPL: 2.9 {RATIO} (ref 2–5)
ESTIMATED AVG GLUCOSE: 143 MG/DL (ref 68–131)
HBA1C MFR BLD: 6.6 % (ref 4–5.6)
HDLC SERPL-MCNC: 74 MG/DL (ref 40–75)
HDLC SERPL: 34.6 % (ref 20–50)
LDLC SERPL CALC-MCNC: 126.2 MG/DL (ref 63–159)
NONHDLC SERPL-MCNC: 140 MG/DL
TRIGL SERPL-MCNC: 69 MG/DL (ref 30–150)

## 2024-01-03 PROCEDURE — 80061 LIPID PANEL: CPT | Performed by: NURSE PRACTITIONER

## 2024-01-03 PROCEDURE — 36415 COLL VENOUS BLD VENIPUNCTURE: CPT | Performed by: NURSE PRACTITIONER

## 2024-01-03 PROCEDURE — 83036 HEMOGLOBIN GLYCOSYLATED A1C: CPT | Performed by: NURSE PRACTITIONER

## 2024-01-05 ENCOUNTER — OFFICE VISIT (OUTPATIENT)
Dept: FAMILY MEDICINE | Facility: CLINIC | Age: 64
End: 2024-01-05
Payer: COMMERCIAL

## 2024-01-05 VITALS
WEIGHT: 203.06 LBS | HEIGHT: 65 IN | TEMPERATURE: 99 F | HEART RATE: 78 BPM | SYSTOLIC BLOOD PRESSURE: 180 MMHG | RESPIRATION RATE: 16 BRPM | BODY MASS INDEX: 33.83 KG/M2 | DIASTOLIC BLOOD PRESSURE: 104 MMHG | OXYGEN SATURATION: 97 %

## 2024-01-05 DIAGNOSIS — F33.0 MAJOR DEPRESSIVE DISORDER, RECURRENT, MILD: ICD-10-CM

## 2024-01-05 DIAGNOSIS — Z12.11 COLON CANCER SCREENING: ICD-10-CM

## 2024-01-05 DIAGNOSIS — Z23 NEED FOR SHINGLES VACCINE: ICD-10-CM

## 2024-01-05 DIAGNOSIS — M79.89 LEG SWELLING: ICD-10-CM

## 2024-01-05 DIAGNOSIS — G47.09 OTHER INSOMNIA: ICD-10-CM

## 2024-01-05 DIAGNOSIS — E11.3413 TYPE 2 DIABETES MELLITUS WITH BOTH EYES AFFECTED BY SEVERE NONPROLIFERATIVE RETINOPATHY AND MACULAR EDEMA, WITHOUT LONG-TERM CURRENT USE OF INSULIN: ICD-10-CM

## 2024-01-05 DIAGNOSIS — E11.65 TYPE 2 DIABETES MELLITUS WITH HYPERGLYCEMIA, WITHOUT LONG-TERM CURRENT USE OF INSULIN: ICD-10-CM

## 2024-01-05 DIAGNOSIS — I10 BENIGN ESSENTIAL HTN: Primary | ICD-10-CM

## 2024-01-05 PROBLEM — Z76.89 ENCOUNTER TO ESTABLISH CARE WITH NEW DOCTOR: Status: RESOLVED | Noted: 2023-11-14 | Resolved: 2024-01-05

## 2024-01-05 PROCEDURE — 99214 OFFICE O/P EST MOD 30 MIN: CPT | Mod: 25,S$GLB,, | Performed by: INTERNAL MEDICINE

## 2024-01-05 PROCEDURE — 3044F HG A1C LEVEL LT 7.0%: CPT | Mod: CPTII,S$GLB,, | Performed by: INTERNAL MEDICINE

## 2024-01-05 PROCEDURE — 3077F SYST BP >= 140 MM HG: CPT | Mod: CPTII,S$GLB,, | Performed by: INTERNAL MEDICINE

## 2024-01-05 PROCEDURE — 90750 HZV VACC RECOMBINANT IM: CPT | Mod: S$GLB,,, | Performed by: INTERNAL MEDICINE

## 2024-01-05 PROCEDURE — 1160F RVW MEDS BY RX/DR IN RCRD: CPT | Mod: CPTII,S$GLB,, | Performed by: INTERNAL MEDICINE

## 2024-01-05 PROCEDURE — 3080F DIAST BP >= 90 MM HG: CPT | Mod: CPTII,S$GLB,, | Performed by: INTERNAL MEDICINE

## 2024-01-05 PROCEDURE — 3008F BODY MASS INDEX DOCD: CPT | Mod: CPTII,S$GLB,, | Performed by: INTERNAL MEDICINE

## 2024-01-05 PROCEDURE — 1159F MED LIST DOCD IN RCRD: CPT | Mod: CPTII,S$GLB,, | Performed by: INTERNAL MEDICINE

## 2024-01-05 PROCEDURE — 99999 PR PBB SHADOW E&M-EST. PATIENT-LVL IV: CPT | Mod: PBBFAC,,, | Performed by: INTERNAL MEDICINE

## 2024-01-05 PROCEDURE — 90471 IMMUNIZATION ADMIN: CPT | Mod: S$GLB,,, | Performed by: INTERNAL MEDICINE

## 2024-01-05 RX ORDER — HYDROCODONE BITARTRATE AND ACETAMINOPHEN 5; 325 MG/1; MG/1
1 TABLET ORAL EVERY 6 HOURS PRN
COMMUNITY
Start: 2023-09-23 | End: 2024-01-05 | Stop reason: ALTCHOICE

## 2024-01-05 RX ORDER — FUROSEMIDE 20 MG/1
20 TABLET ORAL EVERY MORNING
COMMUNITY
Start: 2023-12-26 | End: 2024-01-05 | Stop reason: SDUPTHER

## 2024-01-05 RX ORDER — CYCLOBENZAPRINE HCL 10 MG
10 TABLET ORAL EVERY 8 HOURS PRN
COMMUNITY
Start: 2023-10-02 | End: 2024-01-05 | Stop reason: ALTCHOICE

## 2024-01-05 RX ORDER — CETIRIZINE HYDROCHLORIDE 10 MG/1
10 TABLET ORAL
COMMUNITY
Start: 2023-12-04 | End: 2024-01-05 | Stop reason: ALTCHOICE

## 2024-01-05 RX ORDER — AZELASTINE 1 MG/ML
1 SPRAY, METERED NASAL 2 TIMES DAILY
COMMUNITY
Start: 2023-12-04

## 2024-01-05 RX ORDER — HYDROCHLOROTHIAZIDE 25 MG/1
25 TABLET ORAL DAILY
Qty: 30 TABLET | Refills: 0 | Status: SHIPPED | OUTPATIENT
Start: 2024-01-05 | End: 2024-02-15 | Stop reason: ALTCHOICE

## 2024-01-05 RX ORDER — FUROSEMIDE 20 MG/1
20 TABLET ORAL EVERY MORNING
Qty: 7 TABLET | Refills: 0 | Status: SHIPPED | OUTPATIENT
Start: 2024-01-05 | End: 2024-02-15 | Stop reason: ALTCHOICE

## 2024-01-05 RX ORDER — BENZONATATE 200 MG/1
200 CAPSULE ORAL 3 TIMES DAILY
COMMUNITY
Start: 2023-12-04 | End: 2024-01-05 | Stop reason: ALTCHOICE

## 2024-01-05 NOTE — ASSESSMENT & PLAN NOTE
Chronic, improving. A1c 6.6% down from 7%  BG controlled 100-126. No >200 or <70.     - continue Trulicity   - counseled about the SE of Trulicity and how to cut down the nausea

## 2024-01-05 NOTE — ASSESSMENT & PLAN NOTE
Chronic, uncontrolled. Losartan ineffective. BL leg edema worsening   Went to UC recently and given 3 days of lasix     - lasix 7 days   - counseled on keeping legs elevated, compression stockings   - HCTZ 25mg daily   - follow up with BP in two weeks   - 3 month follow up

## 2024-01-05 NOTE — PROGRESS NOTES
Health Maintenance Due   Topic Date Due    HIV Screening      Colorectal Cancer Screening      Shingles Vaccine (1 of 2) hx chickenpox ; inform pt can get vaccine at pharmacy.    RSV Vaccine (Age 60+ and Pregnant patients) (1 - 1-dose 60+ series)     COVID-19 Vaccine (6 - 2023-24 season)

## 2024-01-10 ENCOUNTER — OFFICE VISIT (OUTPATIENT)
Dept: ENDOCRINOLOGY | Facility: CLINIC | Age: 64
End: 2024-01-10
Payer: COMMERCIAL

## 2024-01-10 DIAGNOSIS — E78.5 HYPERLIPIDEMIA, UNSPECIFIED HYPERLIPIDEMIA TYPE: ICD-10-CM

## 2024-01-10 DIAGNOSIS — E66.9 NON MORBID OBESITY, UNSPECIFIED OBESITY TYPE: ICD-10-CM

## 2024-01-10 DIAGNOSIS — E11.65 TYPE 2 DIABETES MELLITUS WITH HYPERGLYCEMIA, WITHOUT LONG-TERM CURRENT USE OF INSULIN: Primary | ICD-10-CM

## 2024-01-10 PROCEDURE — 1160F RVW MEDS BY RX/DR IN RCRD: CPT | Mod: CPTII,95,, | Performed by: NURSE PRACTITIONER

## 2024-01-10 PROCEDURE — 1159F MED LIST DOCD IN RCRD: CPT | Mod: CPTII,95,, | Performed by: NURSE PRACTITIONER

## 2024-01-10 PROCEDURE — 99214 OFFICE O/P EST MOD 30 MIN: CPT | Mod: 95,,, | Performed by: NURSE PRACTITIONER

## 2024-01-10 PROCEDURE — 3044F HG A1C LEVEL LT 7.0%: CPT | Mod: CPTII,95,, | Performed by: NURSE PRACTITIONER

## 2024-01-10 RX ORDER — DULAGLUTIDE 0.75 MG/.5ML
0.75 INJECTION, SOLUTION SUBCUTANEOUS
Qty: 4 PEN | Refills: 3 | Status: SHIPPED | OUTPATIENT
Start: 2024-01-10 | End: 2025-01-09

## 2024-01-10 RX ORDER — DULAGLUTIDE 0.75 MG/.5ML
0.75 INJECTION, SOLUTION SUBCUTANEOUS
Qty: 4 PEN | Refills: 3 | Status: SHIPPED | OUTPATIENT
Start: 2024-01-10 | End: 2024-01-10

## 2024-01-10 NOTE — PATIENT INSTRUCTIONS
Start exercise regimen.   Continue Trulicity 0.75 mg weekly. Patient is ok continuing with it, nausea is tolerable.   Continue healthy diet.     Return to clinic as needed.   Follow up with Dr. Ocasio for chronic diabetes treatment.

## 2024-01-10 NOTE — PROGRESS NOTES
CC: This 63 y.o. Black or  female  is here for evaluation of  T2DM along with comorbidities indicated in the Visit Diagnosis section of this encounter.    HPI: Machelle Morin was diagnosed with T2DM in May 2023. Glyburide started at the time of diagnosis.     DM COMPLICATIONS: none      Prior visit 10/2023 virtual visit   A1c is down from 8.3 to 7%.   She has stopped glyburide and started Trulicity. C/o nausea for 1 day after Trulicity injection. Appetite lower, reports she has lost weight but does not weigh herself.   She has found visits with diabetes educator helpful.   Plan Discussed increasing Trulicity dose - pt opted to wait until next visit and will focus on resuming exercise regimen.   Test glucose 1x/day - pre-meal/2 hours after meals. Reviewed glycemic goals.   Return to clinic in 3 months with labs prior. - virtual     Interval hx virtual visit   A1c is down from 7 to 6.6%.         LAST DIABETES EDUCATION: July, Aug, 2023 with ANSON Reyna RN     HOSPITALIZED FOR DIABETES OR RELATED COMPLICATIONS -  No.    SIGNIFICANT DIABETES MED HISTORY:   Does not want metformin as she believes it damaged her 's kidneys.     PRESCRIBED DIABETES MEDICATIONS:   Trulicity 0.75 mg weekly on        Misses medication doses - No      SELF MONITORING BLOOD GLUCOSE: Monitors blood glucose at home 1x/day. -146, mostly 110-120s    HYPOGLYCEMIC EPISODES: none      CURRENT DIET: drinks coffee with creamer and Truvia - 1 tumbler per day, sometimes 2, which is an improvement.  drinks water and occ diet sprite.   Breakfast - usually skips breakfast, only has coffee   Lunch - salad.   Dinner - veggies and protein   Snacks - almonds or pecans.   Has stayed with her diet. Avoids ice cream.       CURRENT EXERCISE: none recent; previously: walks in neighborhood 1/2 hour daily.     SOCIAL:   had DM       There were no vitals taken for this visit.    ROS:   CONSTITUTIONAL:  "Appetite good, denies fatigue  GI: + mild nausea x 2 days after Trulicity is injected      PHYSICAL EXAM:  GENERAL: Well developed, well nourished. No acute distress.   PSYCH: AAOx3, appropriate mood and affect, conversant, well-groomed. Judgement and insight good.         Hemoglobin A1C   Date Value Ref Range Status   01/03/2024 6.6 (H) 4.0 - 5.6 % Final     Comment:     ADA Screening Guidelines:  5.7-6.4%  Consistent with prediabetes  >or=6.5%  Consistent with diabetes    High levels of fetal hemoglobin interfere with the HbA1C  assay. Heterozygous hemoglobin variants (HbS, HgC, etc)do  not significantly interfere with this assay.   However, presence of multiple variants may affect accuracy.     10/03/2023 7.0 (H) 4.0 - 5.6 % Final     Comment:     ADA Screening Guidelines:  5.7-6.4%  Consistent with prediabetes  >or=6.5%  Consistent with diabetes    High levels of fetal hemoglobin interfere with the HbA1C  assay. Heterozygous hemoglobin variants (HbS, HgC, etc)do  not significantly interfere with this assay.   However, presence of multiple variants may affect accuracy.     07/19/2023 8.3 (H) 4.0 - 5.6 % Final     Comment:     ADA Screening Guidelines:  5.7-6.4%  Consistent with prediabetes  >or=6.5%  Consistent with diabetes    High levels of fetal hemoglobin interfere with the HbA1C  assay. Heterozygous hemoglobin variants (HbS, HgC, etc)do  not significantly interfere with this assay.   However, presence of multiple variants may affect accuracy.         No results found for: "CPEPTIDE", "GLUTAMICACID", "ISLETCELLANT", "FRUCTOSAMINE"     Lab Results   Component Value Date    CHOL 214 (H) 01/03/2024    CHOL 259 (H) 05/11/2023     Lab Results   Component Value Date    HDL 74 01/03/2024    HDL 95 (H) 05/11/2023     Lab Results   Component Value Date    LDLCALC 126.2 01/03/2024    LDLCALC 149.0 05/11/2023     Lab Results   Component Value Date    TRIG 69 01/03/2024    TRIG 75 05/11/2023     Lab Results   Component " Value Date    CHOLHDL 34.6 01/03/2024    CHOLHDL 36.7 05/11/2023         Component Value Date/Time     05/11/2023 0920    K 4.3 05/11/2023 0920     05/11/2023 0920    CO2 28 05/11/2023 0920    BUN 18 05/11/2023 0920    CREATININE 0.9 05/11/2023 0920     (H) 05/11/2023 0920    CALCIUM 8.7 05/11/2023 0920    ALKPHOS 73 05/11/2023 0920    AST 21 05/11/2023 0920    ALT 17 05/11/2023 0920    BILITOT 0.5 05/11/2023 0920    EGFRNORACEVR >60.0 05/11/2023 0920         Lab Results   Component Value Date    LABMICR 1941.0 11/14/2023    CREATRANDUR 80.0 11/14/2023    MICALBCREAT 2426.3 (H) 11/14/2023             ASSESSMENT and PLAN:    A1C GOAL: < 7 %     1. Type 2 diabetes mellitus with hyperglycemia, without long-term current use of insulin  Start exercise regimen.   Continue Trulicity 0.75 mg weekly. Patient is ok continuing with it, nausea is tolerable.   Continue healthy diet.     Return to clinic as needed.   Follow up with Dr. Ocasio for chronic diabetes treatment.     dulaglutide (TRULICITY) 0.75 mg/0.5 mL pen injector      2. Non morbid obesity, unspecified obesity type  dulaglutide (TRULICITY) 0.75 mg/0.5 mL pen injector      3. Hyperlipidemia, unspecified hyperlipidemia type  Pt has been taking atorvastatin 40 mg daily. Will hold off on changing to rosuvastatin since pt is being [facility]. Pt will f/u with PCP.         No orders of the defined types were placed in this encounter.       Follow up if symptoms worsen or fail to improve.       The patient location is: [place]  The chief complaint leading to consultation is: type 2 diabetes     Visit type: audiovisual    Face to Face time with patient: 24 minutes of total time spent on the encounter, which includes face to face time and non-face to face time preparing to see the patient (eg, review of tests), Obtaining and/or reviewing separately obtained history, Documenting clinical information in the electronic or other health  record, Independently interpreting results (not separately reported) and communicating results to the patient/family/caregiver, or Care coordination (not separately reported).         Each patient to whom he or she provides medical services by telemedicine is:  (1) informed of the relationship between the physician and patient and the respective role of any other health care provider with respect to management of the patient; and (2) notified that he or she may decline to receive medical services by telemedicine and may withdraw from such care at any time.    Notes:

## 2024-01-10 NOTE — Clinical Note
Riley Ocasio, pt is being transferred back to [facility] with controlled DM. A1c is 6.6%. she has appt with you next month. Thanks, Nancy

## 2024-01-16 ENCOUNTER — TELEPHONE (OUTPATIENT)
Dept: OBSTETRICS AND GYNECOLOGY | Facility: CLINIC | Age: 64
End: 2024-01-16
Payer: COMMERCIAL

## 2024-02-07 ENCOUNTER — LAB VISIT (OUTPATIENT)
Dept: LAB | Facility: HOSPITAL | Age: 64
End: 2024-02-07
Attending: INTERNAL MEDICINE
Payer: COMMERCIAL

## 2024-02-07 DIAGNOSIS — E11.65 TYPE 2 DIABETES MELLITUS WITH HYPERGLYCEMIA, WITHOUT LONG-TERM CURRENT USE OF INSULIN: ICD-10-CM

## 2024-02-07 DIAGNOSIS — Z11.4 SCREENING FOR HIV (HUMAN IMMUNODEFICIENCY VIRUS): ICD-10-CM

## 2024-02-07 LAB
ESTIMATED AVG GLUCOSE: 140 MG/DL (ref 68–131)
HBA1C MFR BLD: 6.5 % (ref 4–5.6)
HIV 1+2 AB+HIV1 P24 AG SERPL QL IA: NORMAL

## 2024-02-07 PROCEDURE — 87389 HIV-1 AG W/HIV-1&-2 AB AG IA: CPT | Performed by: INTERNAL MEDICINE

## 2024-02-07 PROCEDURE — 83036 HEMOGLOBIN GLYCOSYLATED A1C: CPT | Performed by: INTERNAL MEDICINE

## 2024-02-07 PROCEDURE — 36415 COLL VENOUS BLD VENIPUNCTURE: CPT | Mod: PO | Performed by: INTERNAL MEDICINE

## 2024-02-15 ENCOUNTER — OFFICE VISIT (OUTPATIENT)
Dept: FAMILY MEDICINE | Facility: CLINIC | Age: 64
End: 2024-02-15
Payer: COMMERCIAL

## 2024-02-15 VITALS
SYSTOLIC BLOOD PRESSURE: 152 MMHG | OXYGEN SATURATION: 98 % | HEART RATE: 73 BPM | TEMPERATURE: 99 F | BODY MASS INDEX: 32.14 KG/M2 | WEIGHT: 193.13 LBS | DIASTOLIC BLOOD PRESSURE: 104 MMHG

## 2024-02-15 DIAGNOSIS — M79.89 LEG SWELLING: ICD-10-CM

## 2024-02-15 DIAGNOSIS — I10 BENIGN ESSENTIAL HTN: ICD-10-CM

## 2024-02-15 DIAGNOSIS — E11.3413 TYPE 2 DIABETES MELLITUS WITH BOTH EYES AFFECTED BY SEVERE NONPROLIFERATIVE RETINOPATHY AND MACULAR EDEMA, WITHOUT LONG-TERM CURRENT USE OF INSULIN: ICD-10-CM

## 2024-02-15 DIAGNOSIS — G47.09 OTHER INSOMNIA: ICD-10-CM

## 2024-02-15 DIAGNOSIS — F33.1 MODERATE EPISODE OF RECURRENT MAJOR DEPRESSIVE DISORDER: Primary | ICD-10-CM

## 2024-02-15 PROBLEM — F33.0 MAJOR DEPRESSIVE DISORDER, RECURRENT, MILD: Status: RESOLVED | Noted: 2023-11-14 | Resolved: 2024-02-15

## 2024-02-15 PROCEDURE — 1160F RVW MEDS BY RX/DR IN RCRD: CPT | Mod: CPTII,S$GLB,, | Performed by: INTERNAL MEDICINE

## 2024-02-15 PROCEDURE — 99999 PR PBB SHADOW E&M-EST. PATIENT-LVL IV: CPT | Mod: PBBFAC,,, | Performed by: INTERNAL MEDICINE

## 2024-02-15 PROCEDURE — 99214 OFFICE O/P EST MOD 30 MIN: CPT | Mod: S$GLB,,, | Performed by: INTERNAL MEDICINE

## 2024-02-15 PROCEDURE — 3044F HG A1C LEVEL LT 7.0%: CPT | Mod: CPTII,S$GLB,, | Performed by: INTERNAL MEDICINE

## 2024-02-15 PROCEDURE — 3077F SYST BP >= 140 MM HG: CPT | Mod: CPTII,S$GLB,, | Performed by: INTERNAL MEDICINE

## 2024-02-15 PROCEDURE — 3008F BODY MASS INDEX DOCD: CPT | Mod: CPTII,S$GLB,, | Performed by: INTERNAL MEDICINE

## 2024-02-15 PROCEDURE — 3080F DIAST BP >= 90 MM HG: CPT | Mod: CPTII,S$GLB,, | Performed by: INTERNAL MEDICINE

## 2024-02-15 PROCEDURE — 1159F MED LIST DOCD IN RCRD: CPT | Mod: CPTII,S$GLB,, | Performed by: INTERNAL MEDICINE

## 2024-02-15 RX ORDER — ZOLPIDEM TARTRATE 10 MG/1
TABLET ORAL
Qty: 90 TABLET | Refills: 0 | Status: SHIPPED | OUTPATIENT
Start: 2024-02-15 | End: 2024-05-08 | Stop reason: SDUPTHER

## 2024-02-15 RX ORDER — LOSARTAN POTASSIUM AND HYDROCHLOROTHIAZIDE 25; 100 MG/1; MG/1
1 TABLET ORAL DAILY
Qty: 90 TABLET | Refills: 0 | Status: SHIPPED | OUTPATIENT
Start: 2024-02-15 | End: 2024-04-18 | Stop reason: HOSPADM

## 2024-02-15 RX ORDER — SERTRALINE HYDROCHLORIDE 50 MG/1
50 TABLET, FILM COATED ORAL DAILY
Qty: 30 TABLET | Refills: 11 | Status: SHIPPED | OUTPATIENT
Start: 2024-02-15 | End: 2024-04-26 | Stop reason: SINTOL

## 2024-02-15 NOTE — ASSESSMENT & PLAN NOTE
Refer to HPI     - switch to Zoloft 50mg daily, counseled on how to transition   - continue f/up with BT and psych

## 2024-02-15 NOTE — ASSESSMENT & PLAN NOTE
Chronic, unresolved, bilateral LE edema +1    - advised to use compression stockings   - to keep legs elevated when sitting   - try to stay active and avoid prolonged sitting if possible  - continue Hyzaar daily

## 2024-02-15 NOTE — PROGRESS NOTES
Health Maintenance Due   Topic     Colorectal Cancer Screening  Consult pcp    RSV Vaccine (Age 60+ and Pregnant patients) (1 - 1-dose 60+ series) Not offered at this office    COVID-19 Vaccine (6 - 2023-24 season) Not offered at this office

## 2024-02-15 NOTE — PROGRESS NOTES
Chief Complaint: Follow-up (Pt states that feet have been swelling for a while)      Machelle Morin  is a 63 y.o. year old patient who presents today for f/up    Patient reports feeling depressed and stressed.  passed away 4 years ago. Her mother passed away in 2017. Has been having family issues and burdens since then. Takes up a lot of family burdens on herself. Reports that she lacks energy to get things done anymore. Has been on lexapro with minimal effect. Has a therapist and a psychiatrist.       Past Surgical History:   Procedure Laterality Date     SECTION      ENDOMETRIAL ABLATION      OVARIAN CYST REMOVAL          Family History   Problem Relation Age of Onset    Alcohol abuse Father     Miscarriages / Stillbirths Mother     Hypertension Mother     Cancer Mother     Arthritis Mother     Pancreatic cancer Mother 83    Early death Brother     Early death Brother     Miscarriages / Stillbirths Sister     Asthma Sister     Asthma Daughter     Colon cancer Neg Hx     Diabetes Neg Hx     Stroke Neg Hx         Social History     Socioeconomic History    Marital status:     Number of children: 1   Tobacco Use    Smoking status: Never     Passive exposure: Never    Smokeless tobacco: Never   Substance and Sexual Activity    Alcohol use: Yes     Alcohol/week: 1.0 standard drink of alcohol     Types: 1 Glasses of wine per week    Drug use: Never    Sexual activity: Not Currently     Partners: Male     Comment:  since 2020 after 38 years marriage     Social Determinants of Health     Financial Resource Strain: Low Risk  (2023)    Overall Financial Resource Strain (CARDIA)     Difficulty of Paying Living Expenses: Not very hard   Food Insecurity: No Food Insecurity (2023)    Hunger Vital Sign     Worried About Running Out of Food in the Last Year: Never true     Ran Out of Food in the Last Year: Never true   Transportation Needs: No Transportation Needs (2023)     PRAPARE - Transportation     Lack of Transportation (Medical): No     Lack of Transportation (Non-Medical): No   Physical Activity: Inactive (11/11/2023)    Exercise Vital Sign     Days of Exercise per Week: 0 days     Minutes of Exercise per Session: 0 min   Stress: Stress Concern Present (11/11/2023)    [employer] [place] [employer] - Occupational Stress Questionnaire     Feeling of Stress : Very much   Social Connections: Unknown (11/11/2023)    Social Connection and Isolation Panel [NHANES]     Frequency of Communication with Friends and Family: More than three times a week     Frequency of Social Gatherings with Friends and Family: More than three times a week     Active Member of Clubs or Organizations: Yes     Attends Club or Organization Meetings: More than 4 times per year     Marital Status:    Housing Stability: Unknown (11/11/2023)    Housing Stability Vital Sign     Unable to Pay for Housing in the Last Year: Patient refused     Number of Places Lived in the Last Year: 1     Unstable Housing in the Last Year: No         Current Outpatient Medications:     atorvastatin (LIPITOR) 40 MG tablet, Take 1 tablet (40 mg total) by mouth once daily., Disp: 90 tablet, Rfl: 3    azelastine (ASTELIN) 137 mcg (0.1 %) nasal spray, 1 spray 2 (two) times daily., Disp: , Rfl:     blood sugar diagnostic Strp, To check BG 2 times daily, to use with insurance preferred meter, Disp: 100 each, Rfl: 2    blood-glucose meter kit, To check BG 2 times daily, to use with insurance preferred meter, Disp: 1 each, Rfl: 0    dulaglutide (TRULICITY) 0.75 mg/0.5 mL pen injector, Inject 0.75 mg into the skin every 7 days., Disp: 4 pen , Rfl: 3    lancets Misc, To check BG 2 times daily, to use with insurance preferred meter, Disp: 100 each, Rfl: 2    ALPRAZolam (XANAX) 0.25 MG tablet, Take 0.25 mg by mouth daily as needed for Anxiety., Disp: , Rfl:     losartan-hydrochlorothiazide 100-25 mg (HYZAAR) 100-25 mg per  tablet, Take 1 tablet by mouth once daily., Disp: 90 tablet, Rfl: 0    sertraline (ZOLOFT) 50 MG tablet, Take 1 tablet (50 mg total) by mouth once daily., Disp: 30 tablet, Rfl: 11    zolpidem (AMBIEN) 10 mg Tab, Ambien 10 mg tablet  Take 1 tablet every day by oral route., Disp: 90 tablet, Rfl: 0     Review of Systems   Cardiovascular:  Positive for leg swelling.   Psychiatric/Behavioral:  Positive for depression.         Objective:      Vitals:    02/15/24 0912   BP: (!) 152/104   Pulse:    Temp:        Physical Exam  Musculoskeletal:      Right lower leg: Edema (+1) present.      Left lower leg: Edema (+1) present.   Psychiatric:         Thought Content: Thought content normal.         Judgment: Judgment normal.      Comments: Feeling down, slow speech, low energy          Assessment:       1. Moderate episode of recurrent major depressive disorder    2. Benign essential HTN    3. 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 Leg swelling    5. Other insomnia          Plan:   1. Moderate episode of recurrent major depressive disorder  Assessment & Plan:  Refer to HPI     - switch to Zoloft 50mg daily, counseled on how to transition   - continue f/up with BT and psych    Orders:  -     sertraline (ZOLOFT) 50 MG tablet; Take 1 tablet (50 mg total) by mouth once daily.  Dispense: 30 tablet; Refill: 11    2. Benign essential HTN  Assessment & Plan:  Chronic, uncontrolled. BP: (!) 152/104 (2/15/2024  9:12 AM)    - counseled patient on lifestyle changes to reduce blood pressure including reducing salt intake, weight loss, eating plenty of fruits and vegetables, cutting down on alcohol and exercise  - switch to Hyzaar   - two week BP check, nurse visit. Advised to measure BP at least three times a week   - three month follow up       Orders:  -     losartan-hydrochlorothiazide 100-25 mg (HYZAAR) 100-25 mg per  tablet; Take 1 tablet by mouth once daily.  Dispense: 90 tablet; Refill: 0    3. 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 Patient's last A1c was   Lab Results   Component Value Date    HGBA1C 6.5 (H) 02/07/2024    .     -  recommended a diet with reduced processed carbs like rice and white bread, reduce sugar/dessert intake and encouraged weight loss.   - continue Trulicity        4. Leg swelling  Assessment & Plan:  Chronic, unresolved, bilateral LE edema +1    - advised to use compression stockings   - to keep legs elevated when sitting   - try to stay active and avoid prolonged sitting if possible  - continue Hyzaar daily         5. Other insomnia  Assessment & Plan:  Chronic, stable     - continue ambien (90 day supply)    Orders:  -     zolpidem (AMBIEN) 10 mg Tab; Ambien 10 mg tablet  Take 1 tablet every day by oral route.  Dispense: 90 tablet; Refill: 0         Follow up in about 3 months (around 5/15/2024) for f/up.

## 2024-02-15 NOTE — ASSESSMENT & PLAN NOTE
Chronic, controlled. Patient's last A1c was   Lab Results   Component Value Date    HGBA1C 6.5 (H) 02/07/2024    .     -  recommended a diet with reduced processed carbs like rice and white bread, reduce sugar/dessert intake and encouraged weight loss.   - continue Trulicity

## 2024-02-15 NOTE — ASSESSMENT & PLAN NOTE
Chronic, uncontrolled. BP: (!) 152/104 (2/15/2024  9:12 AM)    - counseled patient on lifestyle changes to reduce blood pressure including reducing salt intake, weight loss, eating plenty of fruits and vegetables, cutting down on alcohol and exercise  - switch to Hyzaar   - two week BP check, nurse visit. Advised to measure BP at least three times a week   - three month follow up

## 2024-02-23 NOTE — PROGRESS NOTES
Antibiotics as prescribed.  Please return to the ER for reevaluation if there is any worsening or if there is not improvement in the next 2 to 3 days.  Someone from hospice/palliative care should be reaching out to you in the next 1 to 2 days for further discussion of care options.  However if you do not hear from them in the next couple of days, you can also call them at the number listed on the paperwork.  With no fever, no abdominal tenderness, and only 1 episode of diarrhea daily this would not be expected to be consistent with C. difficile.  However if there is development of fever, abdominal pain, or worsening of diarrhea please come back for a recheck.  Please call your primary care doctor to arrange a follow-up appointment as soon as possible.  You have also been referred to urology and will likely hear from them in the next couple of days, but if you do not you can call them at the number listed on your paperwork.   Chief Complaint: Foot Swelling      Machelle Morin  is a 63 y.o. year old patient who presents today for leg swelling and BP     Patient has not responded well to increase in losartan. Also reports increased leg swelling. Reports nausea about 5-6 days after trulicity. No effect from trazodone. BG controlled 100-126. No >200 or <70.     Past Surgical History:   Procedure Laterality Date     SECTION      ENDOMETRIAL ABLATION      OVARIAN CYST REMOVAL          Family History   Problem Relation Age of Onset    Alcohol abuse Father     Miscarriages / Stillbirths Mother     Hypertension Mother     Cancer Mother     Arthritis Mother     Pancreatic cancer Mother 83    Early death Brother     Early death Brother     Miscarriages / Stillbirths Sister     Asthma Sister     Asthma Daughter     Colon cancer Neg Hx     Diabetes Neg Hx     Stroke Neg Hx         Social History     Socioeconomic History    Marital status:     Number of children: 1   Tobacco Use    Smoking status: Never     Passive exposure: Never    Smokeless tobacco: Never   Substance and Sexual Activity    Alcohol use: Yes     Alcohol/week: 1.0 standard drink of alcohol     Types: 1 Glasses of wine per week    Drug use: Never    Sexual activity: Not Currently     Partners: Male     Comment:  since  after 38 years marriage     Social Determinants of Health     Financial Resource Strain: Low Risk  (2023)    Overall Financial Resource Strain (CARDIA)     Difficulty of Paying Living Expenses: Not very hard   Food Insecurity: No Food Insecurity (2023)    Hunger Vital Sign     Worried About Running Out of Food in the Last Year: Never true     Ran Out of Food in the Last Year: Never true   Transportation Needs: No Transportation Needs (2023)    PRAPARE - Transportation     Lack of Transportation (Medical): No     Lack of Transportation (Non-Medical): No   Physical Activity: Inactive (2023)    Exercise Vital Sign      Days of Exercise per Week: 0 days     Minutes of Exercise per Session: 0 min   Stress: Stress Concern Present (11/11/2023)    [employer] [place] [employer] - Occupational Stress Questionnaire     Feeling of Stress : Very much   Social Connections: Unknown (11/11/2023)    Social Connection and Isolation Panel [NHANES]     Frequency of Communication with Friends and Family: More than three times a week     Frequency of Social Gatherings with Friends and Family: More than three times a week     Active Member of Clubs or Organizations: Yes     Attends Club or Organization Meetings: More than 4 times per year     Marital Status:    Housing Stability: Unknown (11/11/2023)    Housing Stability Vital Sign     Unable to Pay for Housing in the Last Year: Patient refused     Number of Places Lived in the Last Year: 1     Unstable Housing in the Last Year: No         Current Outpatient Medications:     ALPRAZolam (XANAX) 0.25 MG tablet, Take 0.25 mg by mouth daily as needed for Anxiety., Disp: , Rfl:     atorvastatin (LIPITOR) 40 MG tablet, Take 1 tablet (40 mg total) by mouth once daily., Disp: 90 tablet, Rfl: 3    azelastine (ASTELIN) 137 mcg (0.1 %) nasal spray, 1 spray 2 (two) times daily., Disp: , Rfl:     blood sugar diagnostic Strp, To check BG 2 times daily, to use with insurance preferred meter, Disp: 100 each, Rfl: 2    blood-glucose meter kit, To check BG 2 times daily, to use with insurance preferred meter, Disp: 1 each, Rfl: 0    dulaglutide (TRULICITY) 0.75 mg/0.5 mL pen injector, Inject 0.75 mg into the skin every 7 days., Disp: 4 pen , Rfl: 3    EScitalopram oxalate (LEXAPRO) 20 MG tablet, Lexapro 20 mg tablet  Take 1 tablet every day by oral route., Disp: , Rfl:     ibuprofen (ADVIL,MOTRIN) 800 MG tablet, Take 1 tablet (800 mg total) by mouth 3 (three) times daily as needed for Pain., Disp: 60 tablet, Rfl: 1    lancets Misc, To check BG 2 times daily, to use with insurance preferred meter,  Disp: 100 each, Rfl: 2    zolpidem (AMBIEN) 10 mg Tab, Ambien 10 mg tablet  Take 1 tablet every day by oral route., Disp: , Rfl:     furosemide (LASIX) 20 MG tablet, Take 1 tablet (20 mg total) by mouth every morning. for 7 days, Disp: 7 tablet, Rfl: 0    hydroCHLOROthiazide (HYDRODIURIL) 25 MG tablet, Take 1 tablet (25 mg total) by mouth once daily., Disp: 30 tablet, Rfl: 0     Review of Systems   Eyes:  Negative for blurred vision.   Respiratory:  Negative for shortness of breath.    Cardiovascular:  Positive for leg swelling. Negative for chest pain.   Gastrointestinal:  Positive for nausea.        Objective:      Vitals:    01/05/24 0708   BP: (!) 180/104   Pulse: 78   Resp: 16   Temp: 98.6 °F (37 °C)       Physical Exam  Vitals and nursing note reviewed.   Constitutional:       Appearance: Normal appearance.   HENT:      Head: Normocephalic and atraumatic.   Cardiovascular:      Rate and Rhythm: Normal rate and regular rhythm.   Pulmonary:      Effort: Pulmonary effort is normal.      Breath sounds: Normal breath sounds. No wheezing or rales.   Musculoskeletal:      Right lower leg: Edema (+3) present.      Left lower leg: Edema (+3) present.   Skin:     General: Skin is warm and dry.   Neurological:      General: No focal deficit present.      Mental Status: She is alert and oriented to person, place, and time.   Psychiatric:         Mood and Affect: Mood normal.         Behavior: Behavior normal.          Assessment:       1. Benign essential HTN    2. Leg swelling    3. Need for shingles vaccine    4. Colon cancer screening    5. Major depressive disorder, recurrent, mild    6. Type 2 diabetes mellitus with hyperglycemia, without long-term current use of insulin    7. 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 Other insomnia          Plan:   1. Benign essential HTN  Assessment & Plan:  Chronic,  uncontrolled. Losartan ineffective. BL leg edema worsening   Went to  recently and given 3 days of lasix     - lasix 7 days   - counseled on keeping legs elevated, compression stockings   - HCTZ 25mg daily   - follow up with BP in two weeks   - 3 month follow up     Orders:  -     hydroCHLOROthiazide (HYDRODIURIL) 25 MG tablet; Take 1 tablet (25 mg total) by mouth once daily.  Dispense: 30 tablet; Refill: 0    2. Leg swelling  Assessment & Plan:  Refer to plan under HTN     - 7 days lasix     Orders:  -     furosemide (LASIX) 20 MG tablet; Take 1 tablet (20 mg total) by mouth every morning. for 7 days  Dispense: 7 tablet; Refill: 0    3. Need for shingles vaccine  -     (In Office Administered) Zoster Recombinant Vaccine    4. Colon cancer screening  -     Ambulatory referral/consult to Endo Procedure ; Future; Expected date: 01/06/2024    5. Major depressive disorder, recurrent, mild  Assessment & Plan:  Chronic, controlled on Lexapro     - continue lexapro       6. Type 2 diabetes mellitus with hyperglycemia, without long-term current use of insulin  Assessment & Plan:  Chronic, improving. A1c 6.6% down from 7%  BG controlled 100-126. No >200 or <70.     - continue Trulicity   - counseled about the SE of Trulicity and how to cut down the nausea      7. 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 A1c 6.6% down from 7%  BG controlled 100-126. No >200 or <70.     - continue Trulicity   - counseled about the SE of Trulicity and how to cut down the nausea      8. Other insomnia  Assessment & Plan:  Chronic, controlled. No effect from trazodone.     - continue ambien PRN for sleep            Follow up in about 3 months (around 4/5/2024) for HTN.

## 2024-02-26 ENCOUNTER — TELEPHONE (OUTPATIENT)
Dept: FAMILY MEDICINE | Facility: CLINIC | Age: 64
End: 2024-02-26
Payer: COMMERCIAL

## 2024-02-26 NOTE — TELEPHONE ENCOUNTER
Phone pt to shy her appt. On 02/29/24 @ 11:00 am pt cancel she will call to shy her appt.   Done vs

## 2024-02-29 ENCOUNTER — TELEPHONE (OUTPATIENT)
Dept: FAMILY MEDICINE | Facility: CLINIC | Age: 64
End: 2024-02-29
Payer: COMMERCIAL

## 2024-02-29 NOTE — TELEPHONE ENCOUNTER
Tried to reach pt about the no show/cancel/ reschedule  appt  on 02/29//24 therefore this appt has been rescheduled to  09/14/24 @ 11:00 am new appointment letter has been sent out to the pt in the mail also a my chart message   Done vs

## 2024-03-01 ENCOUNTER — CLINICAL SUPPORT (OUTPATIENT)
Dept: FAMILY MEDICINE | Facility: CLINIC | Age: 64
End: 2024-03-01
Payer: COMMERCIAL

## 2024-03-01 ENCOUNTER — PATIENT MESSAGE (OUTPATIENT)
Dept: ADMINISTRATIVE | Facility: HOSPITAL | Age: 64
End: 2024-03-01
Payer: COMMERCIAL

## 2024-03-01 VITALS — SYSTOLIC BLOOD PRESSURE: 142 MMHG | DIASTOLIC BLOOD PRESSURE: 84 MMHG

## 2024-03-01 DIAGNOSIS — I10 BENIGN ESSENTIAL HTN: Primary | ICD-10-CM

## 2024-03-01 PROCEDURE — 99999 PR PBB SHADOW E&M-EST. PATIENT-LVL II: CPT | Mod: PBBFAC,,,

## 2024-03-01 NOTE — PROGRESS NOTES
Machelle Morin 63 y.o. female is here today for Blood Pressure check.   History of HTN yes.    Review of patient's allergies indicates:   Allergen Reactions    Sulfa (sulfonamide antibiotics) Itching    Cephalexin Nausea And Vomiting     Creatinine   Date Value Ref Range Status   05/11/2023 0.9 0.5 - 1.4 mg/dL Final     Sodium   Date Value Ref Range Status   05/11/2023 136 136 - 145 mmol/L Final     Potassium   Date Value Ref Range Status   05/11/2023 4.3 3.5 - 5.1 mmol/L Final   ]  Patient verifies taking blood pressure medications on a regular basis at the same time of the day.     Current Outpatient Medications:     ALPRAZolam (XANAX) 0.25 MG tablet, Take 0.25 mg by mouth daily as needed for Anxiety., Disp: , Rfl:     atorvastatin (LIPITOR) 40 MG tablet, Take 1 tablet (40 mg total) by mouth once daily., Disp: 90 tablet, Rfl: 3    azelastine (ASTELIN) 137 mcg (0.1 %) nasal spray, 1 spray 2 (two) times daily., Disp: , Rfl:     blood sugar diagnostic Strp, To check BG 2 times daily, to use with insurance preferred meter, Disp: 100 each, Rfl: 2    blood-glucose meter kit, To check BG 2 times daily, to use with insurance preferred meter, Disp: 1 each, Rfl: 0    dulaglutide (TRULICITY) 0.75 mg/0.5 mL pen injector, Inject 0.75 mg into the skin every 7 days., Disp: 4 pen , Rfl: 3    lancets Misc, To check BG 2 times daily, to use with insurance preferred meter, Disp: 100 each, Rfl: 2    losartan-hydrochlorothiazide 100-25 mg (HYZAAR) 100-25 mg per tablet, Take 1 tablet by mouth once daily., Disp: 90 tablet, Rfl: 0    sertraline (ZOLOFT) 50 MG tablet, Take 1 tablet (50 mg total) by mouth once daily., Disp: 30 tablet, Rfl: 11    zolpidem (AMBIEN) 10 mg Tab, Ambien 10 mg tablet  Take 1 tablet every day by oral route., Disp: 90 tablet, Rfl: 0  Does patient have record of home blood pressure readings no. Readings have been averaging not done.   Last dose of blood pressure medication was taken at 7:45 this  morning.  Patient is asymptomatic.   Complains of no complaints.    Vitals:    03/01/24 0827 03/01/24 0840   BP: (!) 160/84 (!) 142/84   BP Location: Right arm Right arm   Patient Position: Sitting Sitting   BP Method: Large (Manual) Large (Manual)         Dr. Ocasio informed of nurse visit.

## 2024-03-07 ENCOUNTER — TELEPHONE (OUTPATIENT)
Dept: ENDOSCOPY | Facility: HOSPITAL | Age: 64
End: 2024-03-07
Payer: COMMERCIAL

## 2024-03-07 ENCOUNTER — PATIENT MESSAGE (OUTPATIENT)
Dept: ENDOSCOPY | Facility: HOSPITAL | Age: 64
End: 2024-03-07
Payer: COMMERCIAL

## 2024-03-11 ENCOUNTER — TELEPHONE (OUTPATIENT)
Dept: ENDOSCOPY | Facility: HOSPITAL | Age: 64
End: 2024-03-11
Payer: COMMERCIAL

## 2024-03-12 ENCOUNTER — PATIENT MESSAGE (OUTPATIENT)
Dept: ADMINISTRATIVE | Facility: HOSPITAL | Age: 64
End: 2024-03-12
Payer: COMMERCIAL

## 2024-03-12 ENCOUNTER — PATIENT OUTREACH (OUTPATIENT)
Dept: ADMINISTRATIVE | Facility: HOSPITAL | Age: 64
End: 2024-03-12
Payer: COMMERCIAL

## 2024-03-12 NOTE — PROGRESS NOTES
Population Health Chart Review & Patient Outreach Details      Additional Pop Health Notes:      Due for overdue HM (colon screening), need to get recent records if already done. If not done, orders/referrals need to be place and schedule. Please forward messages to me.   Please advise of message above.  Left message for patient to return call.  Sent [employer] message. Please advise of message above.         Updates Requested / Reviewed:      Updated Care Coordination Note, Care Everywhere, and Care Team Updated         Health Maintenance Topics Overdue:      VBHM Score: 2     Colon Cancer Screening  Uncontrolled BP    Shingles/Zoster Vaccine  RSV Vaccine                  Health Maintenance Topic(s) Outreach Outcomes & Actions Taken:    Colorectal Cancer Screening - Outreach Outcomes & Actions Taken  : LM and sent portal message.

## 2024-03-15 ENCOUNTER — PATIENT MESSAGE (OUTPATIENT)
Dept: FAMILY MEDICINE | Facility: CLINIC | Age: 64
End: 2024-03-15
Payer: COMMERCIAL

## 2024-03-15 DIAGNOSIS — E11.9 NEW ONSET TYPE 2 DIABETES MELLITUS: ICD-10-CM

## 2024-03-15 NOTE — TELEPHONE ENCOUNTER
Care Due:                  Date            Visit Type   Department     Provider  --------------------------------------------------------------------------------                                EP -                              PRIMARY      [facility]  Last Visit: 02-      CARE ([facility])   MEDICINE       Tricia Ocasio                              EP -                              PRIMARY      [facility]  Next Visit: 05-      CARE ([facility])   MEDICINE       Tricia Ocasio                                                            Last  Test          Frequency    Reason                     Performed    Due Date  --------------------------------------------------------------------------------    CMP.........  12 months..  atorvastatin,              05- 05-                             losartan-hydrochlorothiaz                             manuel......................    [employer] Embedded Care Due Messages. Reference number: [id number].   3/15/2024 5:15:07 PM CDT

## 2024-03-16 RX ORDER — CALCIUM CITRATE/VITAMIN D3 200MG-6.25
TABLET ORAL 2 TIMES DAILY
Qty: 100 STRIP | Refills: 3 | Status: SHIPPED | OUTPATIENT
Start: 2024-03-16 | End: 2024-04-17 | Stop reason: CLARIF

## 2024-03-16 NOTE — TELEPHONE ENCOUNTER
Provider Staff:  Action required for this patient    Requires labs      Please see care gap opportunities below in Care Due Message.    Thanks!  [facility]     Appointments      Date Provider   Last Visit   2/15/2024 Tricia Ocasio MD   Next Visit   5/15/2024 Tricia Ocasio MD     Refill Decision Note   Machelle Morin  is requesting a refill authorization.  Brief Assessment and Rationale for Refill:  Approve     Medication Therapy Plan:        Comments:     Note composed:12:03 AM 03/16/2024

## 2024-03-27 ENCOUNTER — PATIENT OUTREACH (OUTPATIENT)
Dept: ADMINISTRATIVE | Facility: HOSPITAL | Age: 64
End: 2024-03-27
Payer: COMMERCIAL

## 2024-03-27 ENCOUNTER — PATIENT MESSAGE (OUTPATIENT)
Dept: ADMINISTRATIVE | Facility: HOSPITAL | Age: 64
End: 2024-03-27
Payer: COMMERCIAL

## 2024-03-27 NOTE — PROGRESS NOTES
Population Health Chart Review & Patient Outreach Details      Additional Pop Health Notes:    Due for overdue HM below          Colon Cancer Screening  Uncontrolled BP  -Need remote home blood pressure reading.             -- need to get recent records if already done. If not done, orders/referrals need to be place and schedule. Please forward messages to me.   LM and sent portal message.          Updates Requested / Reviewed:      Updated Care Coordination Note and Care Everywhere         Health Maintenance Topics Overdue:      VBHM Score: 2     Colon Cancer Screening  Uncontrolled BP    Shingles/Zoster Vaccine  RSV Vaccine                  Health Maintenance Topic(s) Outreach Outcomes & Actions Taken:    Colorectal Cancer Screening - Outreach Outcomes & Actions Taken  : LM and sent portal message.

## 2024-04-10 DIAGNOSIS — Z78.0 MENOPAUSE: ICD-10-CM

## 2024-04-17 ENCOUNTER — HOSPITAL ENCOUNTER (INPATIENT)
Facility: HOSPITAL | Age: 64
LOS: 1 days | Discharge: HOME | DRG: 304 | End: 2024-04-18
Attending: STUDENT IN AN ORGANIZED HEALTH CARE EDUCATION/TRAINING PROGRAM | Admitting: INTERNAL MEDICINE
Payer: COMMERCIAL

## 2024-04-17 DIAGNOSIS — I16.1 HYPERTENSIVE EMERGENCY: Primary | ICD-10-CM

## 2024-04-17 DIAGNOSIS — R07.9 CHEST PAIN: ICD-10-CM

## 2024-04-17 DIAGNOSIS — R79.89 ELEVATED BRAIN NATRIURETIC PEPTIDE (BNP) LEVEL: ICD-10-CM

## 2024-04-17 DIAGNOSIS — M79.89 LEG SWELLING: ICD-10-CM

## 2024-04-17 DIAGNOSIS — R80.9 NEPHROTIC RANGE PROTEINURIA: ICD-10-CM

## 2024-04-17 DIAGNOSIS — I10 HYPERTENSION, UNSPECIFIED TYPE: ICD-10-CM

## 2024-04-17 PROBLEM — N17.9 AKI (ACUTE KIDNEY INJURY): Status: ACTIVE | Noted: 2024-04-17

## 2024-04-17 PROBLEM — E87.6 HYPOKALEMIA: Status: ACTIVE | Noted: 2024-04-17

## 2024-04-17 LAB
ALBUMIN SERPL BCP-MCNC: 2.8 G/DL (ref 3.5–5.2)
ALP SERPL-CCNC: 69 U/L (ref 55–135)
ALT SERPL W/O P-5'-P-CCNC: 33 U/L (ref 10–44)
ANION GAP SERPL CALC-SCNC: 8 MMOL/L (ref 8–16)
AST SERPL-CCNC: 61 U/L (ref 10–40)
BASOPHILS # BLD AUTO: 0.01 K/UL (ref 0–0.2)
BASOPHILS NFR BLD: 0.2 % (ref 0–1.9)
BILIRUB SERPL-MCNC: 0.4 MG/DL (ref 0.1–1)
BNP SERPL-MCNC: 200 PG/ML (ref 0–99)
BUN SERPL-MCNC: 33 MG/DL (ref 8–23)
CALCIUM SERPL-MCNC: 8.5 MG/DL (ref 8.7–10.5)
CHLORIDE SERPL-SCNC: 107 MMOL/L (ref 95–110)
CO2 SERPL-SCNC: 25 MMOL/L (ref 23–29)
CREAT SERPL-MCNC: 1.6 MG/DL (ref 0.5–1.4)
DIFFERENTIAL METHOD BLD: ABNORMAL
EOSINOPHIL # BLD AUTO: 0.2 K/UL (ref 0–0.5)
EOSINOPHIL NFR BLD: 3 % (ref 0–8)
ERYTHROCYTE [DISTWIDTH] IN BLOOD BY AUTOMATED COUNT: 13.4 % (ref 11.5–14.5)
EST. GFR  (NO RACE VARIABLE): 36 ML/MIN/1.73 M^2
GLUCOSE SERPL-MCNC: 124 MG/DL (ref 70–110)
HCT VFR BLD AUTO: 31.2 % (ref 37–48.5)
HGB BLD-MCNC: 10.1 G/DL (ref 12–16)
IMM GRANULOCYTES # BLD AUTO: 0.01 K/UL (ref 0–0.04)
IMM GRANULOCYTES NFR BLD AUTO: 0.2 % (ref 0–0.5)
LYMPHOCYTES # BLD AUTO: 1.8 K/UL (ref 1–4.8)
LYMPHOCYTES NFR BLD: 28.8 % (ref 18–48)
MCH RBC QN AUTO: 29.7 PG (ref 27–31)
MCHC RBC AUTO-ENTMCNC: 32.4 G/DL (ref 32–36)
MCV RBC AUTO: 92 FL (ref 82–98)
MONOCYTES # BLD AUTO: 0.4 K/UL (ref 0.3–1)
MONOCYTES NFR BLD: 5.5 % (ref 4–15)
NEUTROPHILS # BLD AUTO: 3.9 K/UL (ref 1.8–7.7)
NEUTROPHILS NFR BLD: 62.3 % (ref 38–73)
NRBC BLD-RTO: 0 /100 WBC
PLATELET # BLD AUTO: 140 K/UL (ref 150–450)
PMV BLD AUTO: 12 FL (ref 9.2–12.9)
POTASSIUM SERPL-SCNC: 3 MMOL/L (ref 3.5–5.1)
PROT SERPL-MCNC: 6.3 G/DL (ref 6–8.4)
RBC # BLD AUTO: 3.4 M/UL (ref 4–5.4)
SODIUM SERPL-SCNC: 140 MMOL/L (ref 136–145)
TROPONIN I SERPL DL<=0.01 NG/ML-MCNC: 0.02 NG/ML (ref 0–0.03)
WBC # BLD AUTO: 6.32 K/UL (ref 3.9–12.7)

## 2024-04-17 PROCEDURE — 99291 CRITICAL CARE FIRST HOUR: CPT

## 2024-04-17 PROCEDURE — 84484 ASSAY OF TROPONIN QUANT: CPT | Performed by: NURSE PRACTITIONER

## 2024-04-17 PROCEDURE — 25000003 PHARM REV CODE 250: Performed by: STUDENT IN AN ORGANIZED HEALTH CARE EDUCATION/TRAINING PROGRAM

## 2024-04-17 PROCEDURE — 93005 ELECTROCARDIOGRAM TRACING: CPT

## 2024-04-17 PROCEDURE — 83880 ASSAY OF NATRIURETIC PEPTIDE: CPT | Performed by: NURSE PRACTITIONER

## 2024-04-17 PROCEDURE — 96375 TX/PRO/DX INJ NEW DRUG ADDON: CPT

## 2024-04-17 PROCEDURE — 96374 THER/PROPH/DIAG INJ IV PUSH: CPT | Mod: 59

## 2024-04-17 PROCEDURE — 93010 ELECTROCARDIOGRAM REPORT: CPT | Mod: ,,, | Performed by: INTERNAL MEDICINE

## 2024-04-17 PROCEDURE — 63600175 PHARM REV CODE 636 W HCPCS: Performed by: STUDENT IN AN ORGANIZED HEALTH CARE EDUCATION/TRAINING PROGRAM

## 2024-04-17 PROCEDURE — 80053 COMPREHEN METABOLIC PANEL: CPT | Performed by: NURSE PRACTITIONER

## 2024-04-17 PROCEDURE — 12000002 HC ACUTE/MED SURGE SEMI-PRIVATE ROOM

## 2024-04-17 PROCEDURE — 96365 THER/PROPH/DIAG IV INF INIT: CPT

## 2024-04-17 PROCEDURE — 85025 COMPLETE CBC W/AUTO DIFF WBC: CPT | Performed by: NURSE PRACTITIONER

## 2024-04-17 RX ORDER — POLYETHYLENE GLYCOL 3350 17 G/17G
17 POWDER, FOR SOLUTION ORAL DAILY
Status: DISCONTINUED | OUTPATIENT
Start: 2024-04-18 | End: 2024-04-19 | Stop reason: HOSPADM

## 2024-04-17 RX ORDER — ENALAPRILAT 1.25 MG/ML
2.5 INJECTION INTRAVENOUS
Status: DISCONTINUED | OUTPATIENT
Start: 2024-04-17 | End: 2024-04-17

## 2024-04-17 RX ORDER — ALUMINUM HYDROXIDE, MAGNESIUM HYDROXIDE, AND SIMETHICONE 1200; 120; 1200 MG/30ML; MG/30ML; MG/30ML
30 SUSPENSION ORAL 4 TIMES DAILY PRN
Status: DISCONTINUED | OUTPATIENT
Start: 2024-04-17 | End: 2024-04-19 | Stop reason: HOSPADM

## 2024-04-17 RX ORDER — NICARDIPINE HYDROCHLORIDE 0.2 MG/ML
0-15 INJECTION INTRAVENOUS CONTINUOUS
Status: DISCONTINUED | OUTPATIENT
Start: 2024-04-17 | End: 2024-04-18

## 2024-04-17 RX ORDER — LANOLIN ALCOHOL/MO/W.PET/CERES
800 CREAM (GRAM) TOPICAL
Status: DISCONTINUED | OUTPATIENT
Start: 2024-04-17 | End: 2024-04-18

## 2024-04-17 RX ORDER — HYDRALAZINE HYDROCHLORIDE 20 MG/ML
10 INJECTION INTRAMUSCULAR; INTRAVENOUS
Status: COMPLETED | OUTPATIENT
Start: 2024-04-17 | End: 2024-04-17

## 2024-04-17 RX ORDER — SODIUM,POTASSIUM PHOSPHATES 280-250MG
2 POWDER IN PACKET (EA) ORAL
Status: DISCONTINUED | OUTPATIENT
Start: 2024-04-17 | End: 2024-04-18

## 2024-04-17 RX ORDER — IBUPROFEN 200 MG
16 TABLET ORAL
Status: DISCONTINUED | OUTPATIENT
Start: 2024-04-17 | End: 2024-04-19 | Stop reason: HOSPADM

## 2024-04-17 RX ORDER — ATORVASTATIN CALCIUM 10 MG/1
40 TABLET, FILM COATED ORAL DAILY
Status: DISCONTINUED | OUTPATIENT
Start: 2024-04-18 | End: 2024-04-19 | Stop reason: HOSPADM

## 2024-04-17 RX ORDER — PROCHLORPERAZINE EDISYLATE 5 MG/ML
5 INJECTION INTRAMUSCULAR; INTRAVENOUS EVERY 6 HOURS PRN
Status: DISCONTINUED | OUTPATIENT
Start: 2024-04-17 | End: 2024-04-19 | Stop reason: HOSPADM

## 2024-04-17 RX ORDER — BISACODYL 10 MG/1
10 SUPPOSITORY RECTAL DAILY PRN
Status: DISCONTINUED | OUTPATIENT
Start: 2024-04-17 | End: 2024-04-19 | Stop reason: HOSPADM

## 2024-04-17 RX ORDER — INSULIN ASPART 100 [IU]/ML
0-5 INJECTION, SOLUTION INTRAVENOUS; SUBCUTANEOUS
Status: DISCONTINUED | OUTPATIENT
Start: 2024-04-17 | End: 2024-04-19 | Stop reason: HOSPADM

## 2024-04-17 RX ORDER — HYDRALAZINE HYDROCHLORIDE 20 MG/ML
10 INJECTION INTRAMUSCULAR; INTRAVENOUS
Status: DISCONTINUED | OUTPATIENT
Start: 2024-04-17 | End: 2024-04-17

## 2024-04-17 RX ORDER — ACETAMINOPHEN 325 MG/1
650 TABLET ORAL EVERY 8 HOURS PRN
Status: DISCONTINUED | OUTPATIENT
Start: 2024-04-17 | End: 2024-04-19 | Stop reason: HOSPADM

## 2024-04-17 RX ORDER — GLUCAGON 1 MG
1 KIT INJECTION
Status: DISCONTINUED | OUTPATIENT
Start: 2024-04-17 | End: 2024-04-19 | Stop reason: HOSPADM

## 2024-04-17 RX ORDER — HYDRALAZINE HYDROCHLORIDE 25 MG/1
50 TABLET, FILM COATED ORAL EVERY 8 HOURS
Status: DISCONTINUED | OUTPATIENT
Start: 2024-04-17 | End: 2024-04-17

## 2024-04-17 RX ORDER — SODIUM CHLORIDE 0.9 % (FLUSH) 0.9 %
10 SYRINGE (ML) INJECTION EVERY 12 HOURS PRN
Status: DISCONTINUED | OUTPATIENT
Start: 2024-04-17 | End: 2024-04-19 | Stop reason: HOSPADM

## 2024-04-17 RX ORDER — ZOLPIDEM TARTRATE 5 MG/1
10 TABLET ORAL NIGHTLY PRN
Status: DISCONTINUED | OUTPATIENT
Start: 2024-04-17 | End: 2024-04-19 | Stop reason: HOSPADM

## 2024-04-17 RX ORDER — ENALAPRILAT 1.25 MG/ML
2.5 INJECTION INTRAVENOUS
Status: COMPLETED | OUTPATIENT
Start: 2024-04-17 | End: 2024-04-17

## 2024-04-17 RX ORDER — NALOXONE HCL 0.4 MG/ML
0.02 VIAL (ML) INJECTION
Status: DISCONTINUED | OUTPATIENT
Start: 2024-04-17 | End: 2024-04-19 | Stop reason: HOSPADM

## 2024-04-17 RX ORDER — IBUPROFEN 200 MG
24 TABLET ORAL
Status: DISCONTINUED | OUTPATIENT
Start: 2024-04-17 | End: 2024-04-19 | Stop reason: HOSPADM

## 2024-04-17 RX ORDER — TALC
6 POWDER (GRAM) TOPICAL NIGHTLY PRN
Status: DISCONTINUED | OUTPATIENT
Start: 2024-04-17 | End: 2024-04-19 | Stop reason: HOSPADM

## 2024-04-17 RX ORDER — FUROSEMIDE 10 MG/ML
40 INJECTION INTRAMUSCULAR; INTRAVENOUS EVERY 12 HOURS
Status: DISCONTINUED | OUTPATIENT
Start: 2024-04-17 | End: 2024-04-19 | Stop reason: HOSPADM

## 2024-04-17 RX ORDER — SIMETHICONE 80 MG
1 TABLET,CHEWABLE ORAL 4 TIMES DAILY PRN
Status: DISCONTINUED | OUTPATIENT
Start: 2024-04-17 | End: 2024-04-19 | Stop reason: HOSPADM

## 2024-04-17 RX ORDER — LOSARTAN POTASSIUM AND HYDROCHLOROTHIAZIDE 12.5; 5 MG/1; MG/1
2 TABLET ORAL ONCE
Status: DISCONTINUED | OUTPATIENT
Start: 2024-04-17 | End: 2024-04-17

## 2024-04-17 RX ORDER — ACETAMINOPHEN 325 MG/1
650 TABLET ORAL EVERY 4 HOURS PRN
Status: DISCONTINUED | OUTPATIENT
Start: 2024-04-17 | End: 2024-04-19 | Stop reason: HOSPADM

## 2024-04-17 RX ORDER — IPRATROPIUM BROMIDE AND ALBUTEROL SULFATE 2.5; .5 MG/3ML; MG/3ML
3 SOLUTION RESPIRATORY (INHALATION) EVERY 4 HOURS PRN
Status: DISCONTINUED | OUTPATIENT
Start: 2024-04-17 | End: 2024-04-18

## 2024-04-17 RX ORDER — ENOXAPARIN SODIUM 100 MG/ML
40 INJECTION SUBCUTANEOUS EVERY 24 HOURS
Status: DISCONTINUED | OUTPATIENT
Start: 2024-04-17 | End: 2024-04-19 | Stop reason: HOSPADM

## 2024-04-17 RX ORDER — LOSARTAN POTASSIUM AND HYDROCHLOROTHIAZIDE 12.5; 5 MG/1; MG/1
2 TABLET ORAL DAILY
Status: DISCONTINUED | OUTPATIENT
Start: 2024-04-18 | End: 2024-04-18

## 2024-04-17 RX ORDER — ONDANSETRON HYDROCHLORIDE 2 MG/ML
4 INJECTION, SOLUTION INTRAVENOUS EVERY 8 HOURS PRN
Status: DISCONTINUED | OUTPATIENT
Start: 2024-04-17 | End: 2024-04-19 | Stop reason: HOSPADM

## 2024-04-17 RX ORDER — SERTRALINE HYDROCHLORIDE 50 MG/1
50 TABLET, FILM COATED ORAL DAILY
Status: DISCONTINUED | OUTPATIENT
Start: 2024-04-18 | End: 2024-04-19 | Stop reason: HOSPADM

## 2024-04-17 RX ADMIN — NICARDIPINE HYDROCHLORIDE 5 MG/HR: 0.2 INJECTION, SOLUTION INTRAVENOUS at 07:04

## 2024-04-17 RX ADMIN — HYDRALAZINE HYDROCHLORIDE 10 MG: 20 INJECTION INTRAMUSCULAR; INTRAVENOUS at 05:04

## 2024-04-17 RX ADMIN — FUROSEMIDE 40 MG: 10 INJECTION, SOLUTION INTRAVENOUS at 10:04

## 2024-04-17 RX ADMIN — ENOXAPARIN SODIUM 40 MG: 40 INJECTION SUBCUTANEOUS at 10:04

## 2024-04-17 RX ADMIN — HYDRALAZINE HYDROCHLORIDE 50 MG: 25 TABLET, FILM COATED ORAL at 08:04

## 2024-04-17 RX ADMIN — ENALAPRILAT 2.5 MG: 2.5 INJECTION INTRAVENOUS at 05:04

## 2024-04-17 RX ADMIN — POTASSIUM BICARBONATE 50 MEQ: 977.5 TABLET, EFFERVESCENT ORAL at 06:04

## 2024-04-17 NOTE — Clinical Note
Diagnosis: Hypertensive emergency [306303]  Future Attending Provider: JASMIN SOTO [[id number]]  Bed request comments: 4th floor.  Reason for IP Medical Treatment  (Clinical interventions that can only be accomplished in the IP setting? ) :: HTN  I c ertify that Inpatient services for greater than or equal to 2 midnights are medically necessary:: Yes  Plans for Post-Acute care--if anticipated (pick the single best option):: A. No post acute care anticipated at this time  Special Needs:: No Specia l Needs [1]  [facility] Provider Team:: Staff

## 2024-04-17 NOTE — FIRST PROVIDER EVALUATION
Medical screening examination initiated.  I have conducted a focused provider triage encounter, findings are as follows:    Brief history of present illness:  Patient has stopped taking her losartan/HCTZ 2 weeks ago because she thought it was making her nauseated.  She is now having swelling to her feet with shortness of breath on exertion.  Blurry vision that started today.  She denies headaches or chest pain.    Vitals:    04/17/24 1702   BP: (!) 238/129   Pulse: 91   Resp: 18   Temp: 98.2 °F (36.8 °C)   TempSrc: Oral   SpO2: 99%   Weight: 83.9 kg (185 lb)       Pertinent physical exam:  2+ pitting edema noted to bilateral lower extremities, no evidence of respiratory distress.  Patient is speaking in full sentences    Brief workup plan:  Labs, EKG, chest x-ray    Preliminary workup initiated; this workup will be continued and followed by the physician or advanced practice provider that is assigned to the patient when roomed.

## 2024-04-17 NOTE — DISCHARGE INSTRUCTIONS

## 2024-04-17 NOTE — ED TRIAGE NOTES
Pt presents to ED via POV with c/o BLE swelling since Saturday. Pt also noted to be hypertensive. States she stopped taking all of her medications appx 2 weeks ago because of nausea. States had several recent medication changes, so she was unsure which was the cause, so she stopped them all. Denies dizziness, weakness, blurred vision, chest pain, SOB.

## 2024-04-17 NOTE — ED PROVIDER NOTES
"Encounter Date: 2024       History     Chief Complaint   Patient presents with    Leg Swelling     Pt reports to ED for bilateral leg and feet swelling for 3 weeks. States she stop taking her BP medications for a little over 2 weeks. Denies HA. Endorses blurry vision today.      64-year-old female with history of hypertension, hyperlipidemia, diabetes, depression, with recent medication changes including Trulicity, combination losartan hydrochlorothiazide, Zoloft presents now for bilateral lower extremity swelling, intermittent and now constant, painless, ongoing since Saturday.  Patient has been on Trulicity for months, and it has caused her nausea, decreased appetite.  She also recently switched her antihypertensives a few weeks ago, but decided to stop taking her new losartan hydrochlorothiazide because she thought it maybe causing her nausea.  Patient also was changed from Lexapro to Zoloft about a week ago, but discontinued the Zoloft because of side effects as well.  She denies suicidal or homicidal ideation.  But she reports "rolled has been turned upside down" swelling started on Saturday, initially unilateral left lower extremity, then bilateral, then bilateral legs, and her niece then called 911 to have her sent to the ER.  The patient reports trying to elevate her feet and intermittently use compression stockings without relief.      Review of patient's allergies indicates:   Allergen Reactions    Sulfa (sulfonamide antibiotics) Itching    Cephalexin Nausea And Vomiting     Past Medical History:   Diagnosis Date    Diabetes mellitus      Past Surgical History:   Procedure Laterality Date     SECTION      ENDOMETRIAL ABLATION      OVARIAN CYST REMOVAL       Family History   Problem Relation Name Age of Onset    Alcohol abuse Father      Miscarriages / Stillbirths Mother      Hypertension Mother      Cancer Mother      Arthritis Mother      Pancreatic cancer Mother  83    Early death Brother   "    Early death Brother      Miscarriages / Stillbirths Sister      Asthma Sister      Asthma Daughter      Colon cancer Neg Hx      Diabetes Neg Hx      Stroke Neg Hx       Social History     Tobacco Use    Smoking status: Never     Passive exposure: Never    Smokeless tobacco: Never   Substance Use Topics    Alcohol use: Yes     Alcohol/week: 1.0 standard drink of alcohol     Types: 1 Glasses of wine per week    Drug use: Never     Review of Systems   Constitutional:  Negative for activity change, appetite change, chills, fever and unexpected weight change.   HENT:  Negative for dental problem and drooling.    Eyes:  Negative for discharge and itching.   Respiratory:  Negative for cough, chest tightness, shortness of breath, wheezing and stridor.    Cardiovascular:  Positive for leg swelling. Negative for chest pain and palpitations.   Gastrointestinal:  Negative for abdominal distention, abdominal pain, diarrhea and nausea.   Genitourinary:  Negative for difficulty urinating, dysuria, frequency and urgency.   Musculoskeletal:  Negative for back pain, gait problem and joint swelling.   Neurological:  Negative for dizziness, syncope, numbness and headaches.   Psychiatric/Behavioral:  Negative for agitation, behavioral problems and confusion.        Physical Exam     Initial Vitals [04/17/24 1702]   BP Pulse Resp Temp SpO2   (!) 238/129 91 18 98.2 °F (36.8 °C) 99 %      MAP       --         Physical Exam    Nursing note and vitals reviewed.  Constitutional: She appears well-developed and well-nourished. She is not diaphoretic.   HENT:   Head: Normocephalic and atraumatic.   Mouth/Throat: Oropharynx is clear and moist.   Eyes: EOM are normal. Pupils are equal, round, and reactive to light. Right eye exhibits no discharge. Left eye exhibits no discharge.   Neck: No tracheal deviation present.   Normal range of motion.  Cardiovascular:  Normal rate, regular rhythm and intact distal pulses.           Pulmonary/Chest: No  respiratory distress. She has no wheezes. She exhibits no tenderness.   Abdominal: Abdomen is soft. She exhibits no distension. There is no abdominal tenderness.   Musculoskeletal:         General: Edema present. No tenderness. Normal range of motion.      Cervical back: Normal range of motion.      Comments: 2+ bilateral lower extremity edema to the knees     Neurological: She is alert and oriented to person, place, and time. She has normal strength. No cranial nerve deficit or sensory deficit. GCS eye subscore is 4. GCS verbal subscore is 5. GCS motor subscore is 6.   Skin: Skin is warm and dry. No rash noted.   Psychiatric: She has a normal mood and affect. Her behavior is normal. Thought content normal.         ED Course   Critical Care    Date/Time: 4/17/2024 11:59 PM    Performed by: Domingo Oakes MD  Authorized by: Domingo Oakes MD  Direct patient critical care time: 15 minutes  Additional history critical care time: 5 minutes  Ordering / reviewing critical care time: 5 minutes  Documentation critical care time: 5 minutes  Total critical care time (exclusive of procedural time) : 30 minutes  Critical care was necessary to treat or prevent imminent or life-threatening deterioration of the following conditions: circulatory failure.  Critical care was time spent personally by me on the following activities: examination of patient, evaluation of patient's response to treatment, obtaining history from patient or surrogate, ordering and performing treatments and interventions, ordering and review of radiographic studies, ordering and review of laboratory studies, pulse oximetry and re-evaluation of patient's condition.        Labs Reviewed   CBC W/ AUTO DIFFERENTIAL - Abnormal; Notable for the following components:       Result Value    RBC 3.40 (*)     Hemoglobin 10.1 (*)     Hematocrit 31.2 (*)     Platelets 140 (*)     All other components within normal limits   COMPREHENSIVE METABOLIC PANEL -  Abnormal; Notable for the following components:    Potassium 3.0 (*)     Glucose 124 (*)     BUN 33 (*)     Creatinine 1.6 (*)     Calcium 8.5 (*)     Albumin 2.8 (*)     AST 61 (*)     eGFR 36 (*)     All other components within normal limits   B-TYPE NATRIURETIC PEPTIDE - Abnormal; Notable for the following components:     (*)     All other components within normal limits   TROPONIN I   URINALYSIS, REFLEX TO URINE CULTURE     EKG Readings: (Independently Interpreted)   EKG with regular rate, regular rhythm, normal axis, no acute ST elevations or depressions, normal AR, QRS and QT interval. Interpreted by me.         Imaging Results              X-Ray Chest AP Portable (Final result)  Result time 04/17/24 17:59:59      Final result by Eber Andrews MD (04/17/24 17:59:59)                   Impression:      Enlarged cardiac silhouette.  Potential pericardial effusion not excluded as no prior studies are available for comparison.  Otherwise no other acute cardiopulmonary process identified.      Electronically signed by: Eber Andrews MD  Date:    04/17/2024  Time:    17:59               Narrative:    EXAMINATION:  XR CHEST AP PORTABLE    CLINICAL HISTORY:  Chest Pain;    TECHNIQUE:  Single frontal view of the chest was performed.    COMPARISON:  None    FINDINGS:  Cardiac silhouette is enlarged.  Lungs are symmetrically expanded.  No evidence of focal consolidative process, pneumothorax, or significant pleural effusion.  No acute osseous abnormality identified.                                    X-Rays:   Independently Interpreted Readings:   Other Readings:  I reviewed the CXR independently of the radiologist.     Chest x-ray was negative for acute cardiopulmonary abnormalities, infiltrates or bony abnormalities.        Medications   niCARdipine 40 mg/200 mL (0.2 mg/mL) infusion (0 mg/hr Intravenous Paused 4/17/24 2121)   atorvastatin tablet 40 mg (has no administration in time range)    losartan-hydrochlorothiazide 50-12.5 mg per tablet 2 tablet (has no administration in time range)   sertraline tablet 50 mg (has no administration in time range)   zolpidem tablet 10 mg (has no administration in time range)   sodium chloride 0.9% flush 10 mL (has no administration in time range)   albuterol-ipratropium 2.5 mg-0.5 mg/3 mL nebulizer solution 3 mL (has no administration in time range)   melatonin tablet 6 mg (has no administration in time range)   ondansetron injection 4 mg (has no administration in time range)   prochlorperazine injection Soln 5 mg (has no administration in time range)   polyethylene glycol packet 17 g (has no administration in time range)   bisacodyL suppository 10 mg (has no administration in time range)   acetaminophen tablet 650 mg (has no administration in time range)   simethicone chewable tablet 80 mg (has no administration in time range)   aluminum-magnesium hydroxide-simethicone 200-200-20 mg/5 mL suspension 30 mL (has no administration in time range)   acetaminophen tablet 650 mg (has no administration in time range)   naloxone 0.4 mg/mL injection 0.02 mg (has no administration in time range)   potassium bicarbonate disintegrating tablet 50 mEq (has no administration in time range)   potassium bicarbonate disintegrating tablet 35 mEq (has no administration in time range)   potassium bicarbonate disintegrating tablet 60 mEq (has no administration in time range)   magnesium oxide tablet 800 mg (has no administration in time range)   magnesium oxide tablet 800 mg (has no administration in time range)   potassium, sodium phosphates 280-160-250 mg packet 2 packet (has no administration in time range)   potassium, sodium phosphates 280-160-250 mg packet 2 packet (has no administration in time range)   potassium, sodium phosphates 280-160-250 mg packet 2 packet (has no administration in time range)   glucose chewable tablet 16 g (has no administration in time range)   glucose  chewable tablet 24 g (has no administration in time range)   glucagon (human recombinant) injection 1 mg (has no administration in time range)   enoxaparin injection 40 mg (40 mg Subcutaneous Given 4/17/24 2222)   insulin aspart U-100 pen 0-5 Units (has no administration in time range)   dextrose 10% bolus 125 mL 125 mL (has no administration in time range)   dextrose 10% bolus 250 mL 250 mL (has no administration in time range)   furosemide injection 40 mg (40 mg Intravenous Given 4/17/24 2221)   hydrALAZINE injection 10 mg (0 mg Intravenous Hold 4/17/24 1830)   enalaprilat injection 2.5 mg (2.5 mg Intravenous Given 4/17/24 1724)   potassium bicarbonate disintegrating tablet 50 mEq (50 mEq Oral Given 4/17/24 1838)     Medical Decision Making  Differential diagnosis includes uncontrolled hypertension, renal failure, CHF, dependent bilateral lower extremity edema.  Vitals here notable for hypertension with blood pressure of 238/129, baseline systolic was 180 per recent chart reviews of PCP's.  She was afebrile with normal heart rate normal respirations and normal oxygen sats.  Exam consistent with painless bilateral lower extremity edema, 2+ to the knees.  Patient has no tenderness to palpation, swelling is bilateral, doubt cellulitis or DVT.    Amount and/or Complexity of Data Reviewed  Labs:  Decision-making details documented in ED Course.    Risk  Prescription drug management.  Decision regarding hospitalization.               ED Course as of 04/17/24 2359 Wed Apr 17, 2024   1739 CBC auto differential(!)  CBC notable for hemoglobin of 10.1 and platelets of 140, similar with chronic anemia and chronic mild thrombocytopenia. [BS]   2358 Comprehensive metabolic panel(!) [BS]   2358 B-Type natriuretic peptide (BNP)(!) [BS]   2358 CBC auto differential(!) [BS]   2358 64-year-old female presents for evaluation of hypertension, bilateral lower extremity swelling.  Workup here notable for KISHAN with creatinine of 1.6,  BNP elevated to 200, she has no echo on record.  patient's blood pressure refractory to hydralazine, enalapril, will place on Cardene drip admit to ICU for hypertensive emergency. [BS]      ED Course User Index  [BS] Domingo Oakes MD                           Clinical Impression:  Final diagnoses:  [M79.89] Leg swelling  [I10] Hypertension, unspecified type  [I16.1] Hypertensive emergency (Primary)          ED Disposition Condition    Admit Stable                Domingo Oakes MD  04/17/24 4606

## 2024-04-18 VITALS
RESPIRATION RATE: 20 BRPM | OXYGEN SATURATION: 100 % | SYSTOLIC BLOOD PRESSURE: 185 MMHG | WEIGHT: 185 LBS | HEART RATE: 78 BPM | DIASTOLIC BLOOD PRESSURE: 78 MMHG | BODY MASS INDEX: 30.79 KG/M2 | TEMPERATURE: 99 F

## 2024-04-18 PROBLEM — R80.9 NEPHROTIC RANGE PROTEINURIA: Status: ACTIVE | Noted: 2024-04-18

## 2024-04-18 PROBLEM — I50.31 ACUTE DIASTOLIC CONGESTIVE HEART FAILURE: Status: ACTIVE | Noted: 2024-04-17

## 2024-04-18 LAB
ALBUMIN SERPL BCP-MCNC: 2.2 G/DL (ref 3.5–5.2)
ALP SERPL-CCNC: 68 U/L (ref 55–135)
ALT SERPL W/O P-5'-P-CCNC: 24 U/L (ref 10–44)
ANION GAP SERPL CALC-SCNC: 4 MMOL/L (ref 8–16)
ASCENDING AORTA: 3.11 CM
AST SERPL-CCNC: 43 U/L (ref 10–40)
AV INDEX (PROSTH): 0.74
AV MEAN GRADIENT: 5 MMHG
AV PEAK GRADIENT: 10 MMHG
AV VALVE AREA BY VELOCITY RATIO: 2.29 CM²
AV VALVE AREA: 2.36 CM²
AV VELOCITY RATIO: 0.71
BACTERIA #/AREA URNS HPF: ABNORMAL /HPF
BASOPHILS # BLD AUTO: 0.01 K/UL (ref 0–0.2)
BASOPHILS NFR BLD: 0.2 % (ref 0–1.9)
BILIRUB SERPL-MCNC: 0.3 MG/DL (ref 0.1–1)
BILIRUB UR QL STRIP: NEGATIVE
BUN SERPL-MCNC: 34 MG/DL (ref 8–23)
CALCIUM SERPL-MCNC: 8.1 MG/DL (ref 8.7–10.5)
CHLORIDE SERPL-SCNC: 109 MMOL/L (ref 95–110)
CLARITY UR: CLEAR
CO2 SERPL-SCNC: 27 MMOL/L (ref 23–29)
COLOR UR: YELLOW
CREAT SERPL-MCNC: 1.6 MG/DL (ref 0.5–1.4)
CREAT UR-MCNC: 63.6 MG/DL (ref 15–325)
CV ECHO LV RWT: 0.55 CM
DIFFERENTIAL METHOD BLD: ABNORMAL
DOP CALC AO PEAK VEL: 1.57 M/S
DOP CALC AO VTI: 30.9 CM
DOP CALC LVOT AREA: 3.2 CM2
DOP CALC LVOT DIAMETER: 2.02 CM
DOP CALC LVOT PEAK VEL: 1.12 M/S
DOP CALC LVOT STROKE VOLUME: 73.03 CM3
DOP CALC MV VTI: 33.7 CM
DOP CALCLVOT PEAK VEL VTI: 22.8 CM
E WAVE DECELERATION TIME: 226.49 MSEC
E/A RATIO: 0.72
E/E' RATIO: 16 M/S
ECHO LV POSTERIOR WALL: 1.36 CM (ref 0.6–1.1)
EOSINOPHIL # BLD AUTO: 0.1 K/UL (ref 0–0.5)
EOSINOPHIL NFR BLD: 1.9 % (ref 0–8)
ERYTHROCYTE [DISTWIDTH] IN BLOOD BY AUTOMATED COUNT: 13.4 % (ref 11.5–14.5)
EST. GFR  (NO RACE VARIABLE): 36 ML/MIN/1.73 M^2
FRACTIONAL SHORTENING: 30 % (ref 28–44)
GLUCOSE SERPL-MCNC: 163 MG/DL (ref 70–110)
GLUCOSE UR QL STRIP: ABNORMAL
HCT VFR BLD AUTO: 27 % (ref 37–48.5)
HGB BLD-MCNC: 8.8 G/DL (ref 12–16)
HGB UR QL STRIP: ABNORMAL
HYALINE CASTS #/AREA URNS LPF: 6 /LPF
IMM GRANULOCYTES # BLD AUTO: 0.01 K/UL (ref 0–0.04)
IMM GRANULOCYTES NFR BLD AUTO: 0.2 % (ref 0–0.5)
INTERVENTRICULAR SEPTUM: 1.31 CM (ref 0.6–1.1)
IVC DIAMETER: 2.29 CM
IVRT: 119.89 MSEC
KETONES UR QL STRIP: NEGATIVE
LA MAJOR: 5.07 CM
LA MINOR: 5.55 CM
LA WIDTH: 4.8 CM
LEFT ATRIUM SIZE: 3.57 CM
LEFT ATRIUM VOLUME: 77.19 CM3
LEFT INTERNAL DIMENSION IN SYSTOLE: 3.49 CM (ref 2.1–4)
LEFT VENTRICLE DIASTOLIC VOLUME: 117.08 ML
LEFT VENTRICLE SYSTOLIC VOLUME: 50.43 ML
LEFT VENTRICULAR INTERNAL DIMENSION IN DIASTOLE: 4.98 CM (ref 3.5–6)
LEFT VENTRICULAR MASS: 270.33 G
LEUKOCYTE ESTERASE UR QL STRIP: NEGATIVE
LV LATERAL E/E' RATIO: 14.67 M/S
LV SEPTAL E/E' RATIO: 17.6 M/S
LVOT MG: 2.75 MMHG
LVOT MV: 0.78 CM/S
LYMPHOCYTES # BLD AUTO: 1.3 K/UL (ref 1–4.8)
LYMPHOCYTES NFR BLD: 23.6 % (ref 18–48)
MAGNESIUM SERPL-MCNC: 1.9 MG/DL (ref 1.6–2.6)
MCH RBC QN AUTO: 29.6 PG (ref 27–31)
MCHC RBC AUTO-ENTMCNC: 32.6 G/DL (ref 32–36)
MCV RBC AUTO: 91 FL (ref 82–98)
MICROSCOPIC COMMENT: ABNORMAL
MONOCYTES # BLD AUTO: 0.3 K/UL (ref 0.3–1)
MONOCYTES NFR BLD: 4.8 % (ref 4–15)
MV MEAN GRADIENT: 5 MMHG
MV PEAK A VEL: 1.23 M/S
MV PEAK E VEL: 0.88 M/S
MV PEAK GRADIENT: 12 MMHG
MV STENOSIS PRESSURE HALF TIME: 65.68 MS
MV VALVE AREA BY CONTINUITY EQUATION: 2.17 CM2
MV VALVE AREA P 1/2 METHOD: 3.35 CM2
NEUTROPHILS # BLD AUTO: 3.7 K/UL (ref 1.8–7.7)
NEUTROPHILS NFR BLD: 69.3 % (ref 38–73)
NITRITE UR QL STRIP: NEGATIVE
NRBC BLD-RTO: 0 /100 WBC
OHS CV RV/LV RATIO: 0.72 CM
OHS QRS DURATION: 92 MS
OHS QTC CALCULATION: 472 MS
PH UR STRIP: 7 [PH] (ref 5–8)
PHOSPHATE SERPL-MCNC: 3.5 MG/DL (ref 2.7–4.5)
PISA TR MAX VEL: 2.7 M/S
PLATELET # BLD AUTO: 123 K/UL (ref 150–450)
PMV BLD AUTO: 11.6 FL (ref 9.2–12.9)
POCT GLUCOSE: 131 MG/DL (ref 70–110)
POTASSIUM SERPL-SCNC: 3.2 MMOL/L (ref 3.5–5.1)
PROT SERPL-MCNC: 5.1 G/DL (ref 6–8.4)
PROT UR QL STRIP: ABNORMAL
PROT UR-MCNC: 438 MG/DL
PROT/CREAT UR: 6.89 MG/G{CREAT} (ref 0–0.2)
PV PEAK GRADIENT: 3 MMHG
PV PEAK VELOCITY: 0.84 M/S
RA MAJOR: 4.74 CM
RA PRESSURE ESTIMATED: 15 MMHG
RA WIDTH: 3.4 CM
RBC # BLD AUTO: 2.97 M/UL (ref 4–5.4)
RBC #/AREA URNS HPF: 5 /HPF (ref 0–4)
RIGHT VENTRICULAR END-DIASTOLIC DIMENSION: 3.58 CM
RV TB RVSP: 18 MMHG
RV TISSUE DOPPLER FREE WALL SYSTOLIC VELOCITY 1 (APICAL 4 CHAMBER VIEW): 15.76 CM/S
SINUS: 3.2 CM
SODIUM SERPL-SCNC: 140 MMOL/L (ref 136–145)
SP GR UR STRIP: 1.01 (ref 1–1.03)
STJ: 2.9 CM
TDI LATERAL: 0.06 M/S
TDI SEPTAL: 0.05 M/S
TDI: 0.06 M/S
TR MAX PG: 29 MMHG
TRICUSPID ANNULAR PLANE SYSTOLIC EXCURSION: 2.95 CM
TV REST PULMONARY ARTERY PRESSURE: 44 MMHG
URN SPEC COLLECT METH UR: ABNORMAL
UROBILINOGEN UR STRIP-ACNC: NEGATIVE EU/DL
WBC # BLD AUTO: 5.39 K/UL (ref 3.9–12.7)
WBC #/AREA URNS HPF: 2 /HPF (ref 0–5)

## 2024-04-18 PROCEDURE — 63600175 PHARM REV CODE 636 W HCPCS: Performed by: STUDENT IN AN ORGANIZED HEALTH CARE EDUCATION/TRAINING PROGRAM

## 2024-04-18 PROCEDURE — 25000003 PHARM REV CODE 250: Performed by: STUDENT IN AN ORGANIZED HEALTH CARE EDUCATION/TRAINING PROGRAM

## 2024-04-18 PROCEDURE — 63600175 PHARM REV CODE 636 W HCPCS: Performed by: HOSPITALIST

## 2024-04-18 PROCEDURE — 80053 COMPREHEN METABOLIC PANEL: CPT | Performed by: STUDENT IN AN ORGANIZED HEALTH CARE EDUCATION/TRAINING PROGRAM

## 2024-04-18 PROCEDURE — 82570 ASSAY OF URINE CREATININE: CPT | Performed by: HOSPITALIST

## 2024-04-18 PROCEDURE — 83735 ASSAY OF MAGNESIUM: CPT | Performed by: STUDENT IN AN ORGANIZED HEALTH CARE EDUCATION/TRAINING PROGRAM

## 2024-04-18 PROCEDURE — 84100 ASSAY OF PHOSPHORUS: CPT | Performed by: STUDENT IN AN ORGANIZED HEALTH CARE EDUCATION/TRAINING PROGRAM

## 2024-04-18 PROCEDURE — 85025 COMPLETE CBC W/AUTO DIFF WBC: CPT | Performed by: STUDENT IN AN ORGANIZED HEALTH CARE EDUCATION/TRAINING PROGRAM

## 2024-04-18 PROCEDURE — 12000002 HC ACUTE/MED SURGE SEMI-PRIVATE ROOM

## 2024-04-18 PROCEDURE — 81000 URINALYSIS NONAUTO W/SCOPE: CPT | Performed by: STUDENT IN AN ORGANIZED HEALTH CARE EDUCATION/TRAINING PROGRAM

## 2024-04-18 RX ORDER — HYDRALAZINE HYDROCHLORIDE 20 MG/ML
10 INJECTION INTRAMUSCULAR; INTRAVENOUS EVERY 6 HOURS PRN
Status: DISCONTINUED | OUTPATIENT
Start: 2024-04-18 | End: 2024-04-19 | Stop reason: HOSPADM

## 2024-04-18 RX ORDER — LOSARTAN POTASSIUM 100 MG/1
100 TABLET ORAL DAILY
Qty: 90 TABLET | Refills: 3 | Status: SHIPPED | OUTPATIENT
Start: 2024-04-18 | End: 2024-05-15 | Stop reason: ALTCHOICE

## 2024-04-18 RX ORDER — FUROSEMIDE 40 MG/1
40 TABLET ORAL 2 TIMES DAILY
Qty: 60 TABLET | Refills: 2 | Status: SHIPPED | OUTPATIENT
Start: 2024-04-18 | End: 2024-07-17

## 2024-04-18 RX ORDER — LOSARTAN POTASSIUM 25 MG/1
100 TABLET ORAL DAILY
Status: DISCONTINUED | OUTPATIENT
Start: 2024-04-19 | End: 2024-04-19 | Stop reason: HOSPADM

## 2024-04-18 RX ORDER — CARVEDILOL 25 MG/1
25 TABLET ORAL 2 TIMES DAILY WITH MEALS
Qty: 180 TABLET | Refills: 3 | Status: SHIPPED | OUTPATIENT
Start: 2024-04-18 | End: 2025-04-18

## 2024-04-18 RX ORDER — LOSARTAN POTASSIUM 25 MG/1
100 TABLET ORAL DAILY
Status: DISCONTINUED | OUTPATIENT
Start: 2024-04-18 | End: 2024-04-18

## 2024-04-18 RX ADMIN — POLYETHYLENE GLYCOL 3350 17 G: 17 POWDER, FOR SOLUTION ORAL at 08:04

## 2024-04-18 RX ADMIN — HYDRALAZINE HYDROCHLORIDE 10 MG: 20 INJECTION INTRAMUSCULAR; INTRAVENOUS at 08:04

## 2024-04-18 RX ADMIN — SERTRALINE HYDROCHLORIDE 50 MG: 50 TABLET ORAL at 08:04

## 2024-04-18 RX ADMIN — ATORVASTATIN CALCIUM 40 MG: 10 TABLET, FILM COATED ORAL at 08:04

## 2024-04-18 RX ADMIN — LOSARTAN POTASSIUM AND HYDROCHLOROTHIAZIDE 2 TABLET: 12.5; 5 TABLET ORAL at 08:04

## 2024-04-18 RX ADMIN — ZOLPIDEM TARTRATE 10 MG: 5 TABLET ORAL at 01:04

## 2024-04-18 RX ADMIN — FUROSEMIDE 40 MG: 10 INJECTION, SOLUTION INTRAVENOUS at 08:04

## 2024-04-18 NOTE — ADMISSIONCARE
AdmissionCare    Guideline: Hypertension - INPT, Inpatient    Based on the indications selected for the patient, the bed status of Inpatient was determined to be MET    The following indications were selected as present at the time of evaluation of the patient:      - Hypertensive emergency indicated by SBP greater than 180 mm Hg or DBP greater than 120 mm Hg with evidence of acute or worsening target organ damage, as indicated by 1 or more of the following:    - Hypertensive encephalopathy (eg, Altered mental status)    - Heart failure (eg, pulmonary edema). See Heart Failure guideline.    AdmissionCare documentation entered by: ANSON Choi    [facility], 27th edition, Copyright © 2023 [facility] [employer], [facility] All Rights Reserved.  [id number]:09:50-05:00

## 2024-04-18 NOTE — ASSESSMENT & PLAN NOTE
"Patient's FSGs are controlled on current medication regimen.  Last A1c reviewed-   Lab Results   Component Value Date    HGBA1C 6.5 (H) 02/07/2024     Most recent fingerstick glucose reviewed- No results for input(s): "POCTGLUCOSE" in the last 24 hours.  Current correctional scale  Low  Maintain anti-hyperglycemic dose as follows-   Antihyperglycemics (From admission, onward)      Start     Stop Route Frequency Ordered    04/17/24 2215  insulin aspart U-100 pen 0-5 Units         -- SubQ Before meals & nightly PRN 04/17/24 2116          Hold Oral hypoglycemics while patient is in the hospital.  "

## 2024-04-18 NOTE — DISCHARGE SUMMARY
[facility] [place] [facility]  [facility] Medicine  Discharge Summary      Patient Name: Machelle Morin  MRN: [id number]  SALVATORE: [id number]  Patient Class: IP- Inpatient  Admission Date: 4/17/2024  Hospital Length of Stay: 1 days  Discharge Date and Time:  04/18/2024 12:50 PM  Attending Physician: Cinthya Jorgensen MD   Discharging Provider: Cinthya Jorgensen MD  Primary Care Provider: Tricia Ocasio MD    Primary Care Team: Networked reference to record PCT     HPI:   This is a 64-year-old female with a past medical history of hypertension, type 2 diabetes, hyperlipidemia, depression, who presents with leg swelling.     Patient presents for evaluation of lower extremity swelling that started about 2 weeks prior to presentation.  Patient reports intermittent nausea since she was started on Trulicity, but had several changes to her medications recently.  Her losartan was switched to losartan-hydrochlorothiazide, and her Lexapro was switched to Zoloft.  Due to concerns of side effects, she discontinued her medications and has not been taking most of them for about 2 weeks.  She denies having dyspnea or orthopnea and uses compression stockings/leg elevation to help with her lower extremity swelling.    In the ED, the patient was hypertensive (233/199, MAP: 167).  Labs remarkable for elevated creatinine (1.6-baseline of 0.9), elevated BNP (200), normocytic anemia (10.1), hypokalemia (3.0).  Chest x-ray showed possible pericardial effusion, with no other acute cardiopulmonary process.  Patient was given hydralazine 10 mg IV x1, enalaprilat 2.5 mg IV, potassium bicarbonate 50 mEq and was started on a nicardipine drip.  She was admitted for further management.    * No surgery found *      Hospital Course:   Ms Machelle Morin is a 64 y.o. woman with HTN, DM who presented with hypertensive emergency, KISHAN vs newly recognized CKD, and lower extremity edema. BP control improved with PO Rx. Cr 1.6 on admit with last labs about  1 year ago. 3+ protein on UA and >6g protein on UPC. TTE with G1DD. Suspect nephrotic range proteinuria from KISHAN vs newly recognized CKD as cause of lower extremity edema. Suspect proteinuria from DM. Acute diastolic CHF may also contribute. Started IV lasix with improvement in edema but no improvement in renal function. Discussed Rx going forward. Continue losartan for proteinuria, DC HCTZ and add lasix for edema, and add coreg for BP control (no amlodipine due to edema already present). Follow up with PCP for BP control. Referral to Nephrology placed. She is having nausea from trulicity that lasts for 3-4 days and is not tolerable. Family is already making follow up with Endocrine to discuss other Rx options. Stable for discharge to home.      Goals of Care Treatment Preferences:  Code Status: Full Code      Consults:     Cardiac/Vascular  Acute diastolic congestive heart failure  Diuresis PRN  Obtain echocardiogram       Renal/  Hypokalemia  Patient has hypokalemia which is Acute and currently controlled. Most recent potassium levels reviewed-   Lab Results   Component Value Date    K 3.0 (L) 04/17/2024   . Will continue potassium replacement per protocol and recheck repeat levels after replacement completed.     KISHAN (acute kidney injury)  Patient with acute kidney injury/acute renal failure likely due to  possibly cardiorenal  KISHAN is currently stable. Baseline creatinine  0.9  - Labs reviewed- Renal function/electrolytes with Estimated Creatinine Clearance: 37.5 mL/min (A) (based on SCr of 1.6 mg/dL (H)). according to latest data. Monitor urine output and serial BMP and adjust therapy as needed. Avoid nephrotoxins and renally dose meds for GFR listed above.      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 Call in 1 day.    Specialty: Internal Medicine  Why: To set up a follow-up appointment, To recheck today's symptoms  Contact information:  [address] [place] [id number] [phone]                           Patient Instructions:      Ambulatory referral/consult to Nephrology   Standing Status: Future   Referral Priority: Routine Referral Type: Consultation   Referral Reason: Specialty Services Required   Requested Specialty: Nephrology   Number of Visits Requested: 1     Diet diabetic     Notify your health care provider if you experience any of the following:  temperature >100.4     Notify your health care provider if you experience any of the following:  persistent nausea and vomiting or diarrhea     Notify your health care provider if you experience any of the following:  severe uncontrolled pain     Notify your health care provider if you experience any of the following:  redness, tenderness, or signs of infection (pain, swelling, redness, odor or green/yellow discharge around incision site)     Notify your health care provider if you experience any of the following:  difficulty breathing or increased cough     Notify your health care provider if you experience any of the following:   severe persistent headache     Notify your health care provider if you experience any of the following:  worsening rash     Notify your health care provider if you experience any of the following:  persistent dizziness, light-headedness, or visual disturbances     Notify your health care provider if you experience any of the following:  increased confusion or weakness     Activity as tolerated       Significant Diagnostic Studies: N/A    Pending Diagnostic Studies:       None           Medications:  Reconciled Home Medications:      Medication List        START taking these medications      carvediloL 25 MG tablet  Commonly known as: COREG  Take 1 tablet (25 mg total) by mouth 2 (two) times daily with meals.     furosemide 40 MG tablet  Commonly known as: LASIX  Take 1 tablet (40 mg total) by mouth 2 (two) times daily.     losartan 100 MG tablet  Commonly known as: COZAAR  Take 1 tablet (100 mg total) by mouth once daily.            CONTINUE taking these medications      atorvastatin 40 MG tablet  Commonly known as: LIPITOR  Take 1 tablet (40 mg total) by mouth once daily.     sertraline 50 MG tablet  Commonly known as: ZOLOFT  Take 1 tablet (50 mg total) by mouth once daily.     TRULICITY 0.75 mg/0.5 mL pen injector  Generic drug: dulaglutide  Inject 0.75 mg into the skin every 7 days.     zolpidem 10 mg Tab  Commonly known as: AMBIEN  Ambien 10 mg tablet  Take 1 tablet every day by oral route.            STOP taking these medications      ALPRAZolam 0.25 MG tablet  Commonly known as: XANAX     azelastine 137 mcg (0.1 %) nasal spray  Commonly known as: ASTELIN     losartan-hydrochlorothiazide 100-25 mg 100-25 mg per tablet  Commonly known as: HYZAAR              Indwelling Lines/Drains at time of discharge:   none      Time spent on the discharge of patient: 35 minutes         Cinthya Jorgensen MD  Department of Hospital Medicine  [facility] - [place] - Emergency Dept

## 2024-04-18 NOTE — SUBJECTIVE & OBJECTIVE
Past Medical History:   Diagnosis Date    Diabetes mellitus        Past Surgical History:   Procedure Laterality Date     SECTION      ENDOMETRIAL ABLATION      OVARIAN CYST REMOVAL         Review of patient's allergies indicates:   Allergen Reactions    Sulfa (sulfonamide antibiotics) Itching    Cephalexin Nausea And Vomiting       Current Facility-Administered Medications   Medication Dose Route Frequency Provider Last Rate Last Admin    hydrALAZINE tablet 50 mg  50 mg Oral Q8H Ken Giordano MD   50 mg at 24    niCARdipine 40 mg/200 mL (0.2 mg/mL) infusion  0-15 mg/hr Intravenous Continuous Domingo Oakes MD 12.5 mL/hr at 24 2.5 mg/hr at 24     Current Outpatient Medications   Medication Sig Dispense Refill    ALPRAZolam (XANAX) 0.25 MG tablet Take 0.25 mg by mouth daily as needed for Anxiety.      atorvastatin (LIPITOR) 40 MG tablet Take 1 tablet (40 mg total) by mouth once daily. 90 tablet 3    azelastine (ASTELIN) 137 mcg (0.1 %) nasal spray 1 spray 2 (two) times daily.      dulaglutide (TRULICITY) 0.75 mg/0.5 mL pen injector Inject 0.75 mg into the skin every 7 days. 4 pen 3    losartan-hydrochlorothiazide 100-25 mg (HYZAAR) 100-25 mg per tablet Take 1 tablet by mouth once daily. 90 tablet 0    sertraline (ZOLOFT) 50 MG tablet Take 1 tablet (50 mg total) by mouth once daily. 30 tablet 11    zolpidem (AMBIEN) 10 mg Tab Ambien 10 mg tablet  Take 1 tablet every day by oral route. 90 tablet 0     Family History       Problem Relation (Age of Onset)    Alcohol abuse Father    Arthritis Mother    Asthma Sister, Daughter    Cancer Mother    Early death Brother, Brother    Hypertension Mother    Miscarriages / Stillbirths Mother, Sister    Pancreatic cancer Mother (83)          Tobacco Use    Smoking status: Never     Passive exposure: Never    Smokeless tobacco: Never   Substance and Sexual Activity    Alcohol use: Yes     Alcohol/week: 1.0 standard drink of alcohol      Types: 1 Glasses of wine per week    Drug use: Never    Sexual activity: Not Currently     Partners: Male     Comment:  since 2020 after 38 years marriage     Review of Systems   Constitutional: Negative.    HENT: Negative.     Eyes: Negative.    Respiratory: Negative.     Cardiovascular:  Positive for leg swelling.   Gastrointestinal: Negative.    Endocrine: Negative.    Genitourinary: Negative.    Musculoskeletal: Negative.    Skin: Negative.    Allergic/Immunologic: Negative.    Neurological: Negative.    Psychiatric/Behavioral: Negative.       Objective:     Vital Signs (Most Recent):  Temp: 98.2 °F (36.8 °C) (04/17/24 1702)  Pulse: 95 (04/17/24 2102)  Resp: 17 (04/17/24 1902)  BP: (!) 180/86 (04/17/24 2102)  SpO2: 100 % (04/17/24 2102) Vital Signs (24h Range):  Temp:  [98.2 °F (36.8 °C)] 98.2 °F (36.8 °C)  Pulse:  [] 95  Resp:  [14-19] 17  SpO2:  [99 %-100 %] 100 %  BP: (145-238)/() 180/86     Weight: 83.9 kg (185 lb)  Body mass index is 30.79 kg/m².     Physical Exam  Vitals and nursing note reviewed.   Constitutional:       General: She is not in acute distress.     Appearance: Normal appearance. She is not ill-appearing.   HENT:      Head: Normocephalic and atraumatic.      Nose: Nose normal.      Mouth/Throat:      Mouth: Mucous membranes are moist.   Eyes:      Extraocular Movements: Extraocular movements intact.   Cardiovascular:      Rate and Rhythm: Normal rate.      Pulses: Normal pulses.      Heart sounds: No murmur heard.  Pulmonary:      Effort: Pulmonary effort is normal. No respiratory distress.   Abdominal:      General: Abdomen is flat.      Palpations: Abdomen is soft.      Tenderness: There is no abdominal tenderness.   Musculoskeletal:      Right lower leg: Edema present.      Left lower leg: Edema present.   Skin:     General: Skin is warm.      Capillary Refill: Capillary refill takes less than 2 seconds.   Neurological:      General: No focal deficit present.      Mental  Status: She is alert.   Psychiatric:         Mood and Affect: Mood normal.                Significant Labs: All pertinent labs within the past 24 hours have been reviewed.    Significant Imaging: I have reviewed all pertinent imaging results/findings within the past 24 hours.

## 2024-04-18 NOTE — ED NOTES
Received report from COLE Gill. Introduced self at bedside, pt is resting comfortably, denies any pain at this time. Pt is alert, calm, and oriented x4, no acute distress noted. Family at bedside

## 2024-04-18 NOTE — ASSESSMENT & PLAN NOTE
Patient has a current diagnosis of Hypertensive emergency with end organ damage evidenced by acute kidney injury which is uncontrolled.  Latest blood pressure and vitals reviewed-   Temp:  [98.2 °F (36.8 °C)]   Pulse:  []   Resp:  [14-19]   BP: (145-238)/()   SpO2:  [99 %-100 %] .   Patient currently on IV antihypertensives.   Home meds for hypertension were reviewed and noted below.   Hypertension Medications               losartan-hydrochlorothiazide 100-25 mg (HYZAAR) 100-25 mg per tablet Take 1 tablet by mouth once daily.            Medication adjustment for hospital antihypertensives is as follows-   Wean nicardipine as tolerated  Resume home medications  Diuresis PRN     Will aim for controlled BP reduction by medications noted above. Monitor and mitigate end organ damage as indicated.

## 2024-04-18 NOTE — HOSPITAL COURSE
Ms Machelle Morin is a 64 y.o. woman with HTN, DM who presented with hypertensive emergency, KISHAN vs newly recognized CKD, and lower extremity edema. BP control improved with PO Rx. Cr 1.6 on admit with last labs about 1 year ago. 3+ protein on UA and >6g protein on UPC. TTE with G1DD. Suspect nephrotic range proteinuria from KISHAN vs newly recognized CKD as cause of lower extremity edema. Suspect proteinuria from DM. Acute diastolic CHF may also contribute. Started IV lasix with improvement in edema but no improvement in renal function. Discussed Rx going forward. Continue losartan for proteinuria, DC HCTZ and add lasix for edema, and add coreg for BP control (no amlodipine due to edema already present). Follow up with PCP for BP control. Referral to Nephrology placed. She is having nausea from trulicity that lasts for 3-4 days and is not tolerable. Family is already making follow up with Endocrine to discuss other Rx options. Stable for discharge to home.

## 2024-04-18 NOTE — H&P
[facility] [place] [facility]  [facility] Medicine  History & Physical    Patient Name: Machelle Morin  MRN: [id number]  Patient Class: IP- Inpatient  Admission Date: 4/17/2024  Attending Physician: Godwin Chester MD   Primary Care Provider: Tricia Oacsio MD         Patient information was obtained from patient, relative(s), and ER records.     Subjective:     Principal Problem:Hypertensive emergency    Chief Complaint:   Chief Complaint   Patient presents with    Leg Swelling     Pt reports to ED for bilateral leg and feet swelling for 3 weeks. States she stop taking her BP medications for a little over 2 weeks. Denies HA. Endorses blurry vision today.         HPI: This is a 64-year-old female with a past medical history of hypertension, type 2 diabetes, hyperlipidemia, depression, who presents with leg swelling.     Patient presents for evaluation of lower extremity swelling that started about 2 weeks prior to presentation.  Patient reports intermittent nausea since she was started on Trulicity, but had several changes to her medications recently.  Her losartan was switched to losartan-hydrochlorothiazide, and her Lexapro was switched to Zoloft.  Due to concerns of side effects, she discontinued her medications and has not been taking most of them for about 2 weeks.  She denies having dyspnea or orthopnea and uses compression stockings/leg elevation to help with her lower extremity swelling.    In the ED, the patient was hypertensive (233/199, MAP: 167).  Labs remarkable for elevated creatinine (1.6-baseline of 0.9), elevated BNP (200), normocytic anemia (10.1), hypokalemia (3.0).  Chest x-ray showed possible pericardial effusion, with no other acute cardiopulmonary process.  Patient was given hydralazine 10 mg IV x1, enalaprilat 2.5 mg IV, potassium bicarbonate 50 mEq and was started on a nicardipine drip.  She was admitted for further management.    Past Medical History:   Diagnosis Date    Diabetes mellitus         Past Surgical History:   Procedure Laterality Date     SECTION      ENDOMETRIAL ABLATION      OVARIAN CYST REMOVAL         Review of patient's allergies indicates:   Allergen Reactions    Sulfa (sulfonamide antibiotics) Itching    Cephalexin Nausea And Vomiting       Current Facility-Administered Medications   Medication Dose Route Frequency Provider Last Rate Last Admin    hydrALAZINE tablet 50 mg  50 mg Oral Q8H Ken Giordano MD   50 mg at 24    niCARdipine 40 mg/200 mL (0.2 mg/mL) infusion  0-15 mg/hr Intravenous Continuous Domingo Oakes MD 12.5 mL/hr at 24 2.5 mg/hr at 24     Current Outpatient Medications   Medication Sig Dispense Refill    ALPRAZolam (XANAX) 0.25 MG tablet Take 0.25 mg by mouth daily as needed for Anxiety.      atorvastatin (LIPITOR) 40 MG tablet Take 1 tablet (40 mg total) by mouth once daily. 90 tablet 3    azelastine (ASTELIN) 137 mcg (0.1 %) nasal spray 1 spray 2 (two) times daily.      dulaglutide (TRULICITY) 0.75 mg/0.5 mL pen injector Inject 0.75 mg into the skin every 7 days. 4 pen 3    losartan-hydrochlorothiazide 100-25 mg (HYZAAR) 100-25 mg per tablet Take 1 tablet by mouth once daily. 90 tablet 0    sertraline (ZOLOFT) 50 MG tablet Take 1 tablet (50 mg total) by mouth once daily. 30 tablet 11    zolpidem (AMBIEN) 10 mg Tab Ambien 10 mg tablet  Take 1 tablet every day by oral route. 90 tablet 0     Family History       Problem Relation (Age of Onset)    Alcohol abuse Father    Arthritis Mother    Asthma Sister, Daughter    Cancer Mother    Early death Brother, Brother    Hypertension Mother    Miscarriages / Stillbirths Mother, Sister    Pancreatic cancer Mother (83)          Tobacco Use    Smoking status: Never     Passive exposure: Never    Smokeless tobacco: Never   Substance and Sexual Activity    Alcohol use: Yes     Alcohol/week: 1.0 standard drink of alcohol     Types: 1 Glasses of wine per week    Drug use: Never    Sexual  activity: Not Currently     Partners: Male     Comment:  since 2020 after 38 years marriage     Review of Systems   Constitutional: Negative.    HENT: Negative.     Eyes: Negative.    Respiratory: Negative.     Cardiovascular:  Positive for leg swelling.   Gastrointestinal: Negative.    Endocrine: Negative.    Genitourinary: Negative.    Musculoskeletal: Negative.    Skin: Negative.    Allergic/Immunologic: Negative.    Neurological: Negative.    Psychiatric/Behavioral: Negative.       Objective:     Vital Signs (Most Recent):  Temp: 98.2 °F (36.8 °C) (04/17/24 1702)  Pulse: 95 (04/17/24 2102)  Resp: 17 (04/17/24 1902)  BP: (!) 180/86 (04/17/24 2102)  SpO2: 100 % (04/17/24 2102) Vital Signs (24h Range):  Temp:  [98.2 °F (36.8 °C)] 98.2 °F (36.8 °C)  Pulse:  [] 95  Resp:  [14-19] 17  SpO2:  [99 %-100 %] 100 %  BP: (145-238)/() 180/86     Weight: 83.9 kg (185 lb)  Body mass index is 30.79 kg/m².     Physical Exam  Vitals and nursing note reviewed.   Constitutional:       General: She is not in acute distress.     Appearance: Normal appearance. She is not ill-appearing.   HENT:      Head: Normocephalic and atraumatic.      Nose: Nose normal.      Mouth/Throat:      Mouth: Mucous membranes are moist.   Eyes:      Extraocular Movements: Extraocular movements intact.   Cardiovascular:      Rate and Rhythm: Normal rate.      Pulses: Normal pulses.      Heart sounds: No murmur heard.  Pulmonary:      Effort: Pulmonary effort is normal. No respiratory distress.   Abdominal:      General: Abdomen is flat.      Palpations: Abdomen is soft.      Tenderness: There is no abdominal tenderness.   Musculoskeletal:      Right lower leg: Edema present.      Left lower leg: Edema present.   Skin:     General: Skin is warm.      Capillary Refill: Capillary refill takes less than 2 seconds.   Neurological:      General: No focal deficit present.      Mental Status: She is alert.   Psychiatric:         Mood and Affect:  Mood normal.                Significant Labs: All pertinent labs within the past 24 hours have been reviewed.    Significant Imaging: I have reviewed all pertinent imaging results/findings within the past 24 hours.  Assessment/Plan:     * Hypertensive emergency  Patient has a current diagnosis of Hypertensive emergency with end organ damage evidenced by acute kidney injury which is uncontrolled.  Latest blood pressure and vitals reviewed-   Temp:  [98.2 °F (36.8 °C)]   Pulse:  []   Resp:  [14-19]   BP: (145-238)/()   SpO2:  [99 %-100 %] .   Patient currently on IV antihypertensives.   Home meds for hypertension were reviewed and noted below.   Hypertension Medications               losartan-hydrochlorothiazide 100-25 mg (HYZAAR) 100-25 mg per tablet Take 1 tablet by mouth once daily.            Medication adjustment for hospital antihypertensives is as follows-   Wean nicardipine as tolerated  Resume home medications  Diuresis PRN     Will aim for controlled BP reduction by medications noted above. Monitor and mitigate end organ damage as indicated.    Elevated brain natriuretic peptide (BNP) level  Diuresis PRN  Obtain echocardiogram       Hypokalemia  Patient has hypokalemia which is Acute and currently controlled. Most recent potassium levels reviewed-   Lab Results   Component Value Date    K 3.0 (L) 04/17/2024   . Will continue potassium replacement per protocol and recheck repeat levels after replacement completed.     KISHAN (acute kidney injury)  Patient with acute kidney injury/acute renal failure likely due to  possibly cardiorenal  KISHAN is currently stable. Baseline creatinine  0.9  - Labs reviewed- Renal function/electrolytes with Estimated Creatinine Clearance: 37.5 mL/min (A) (based on SCr of 1.6 mg/dL (H)). according to latest data. Monitor urine output and serial BMP and adjust therapy as needed. Avoid nephrotoxins and renally dose meds for GFR listed above.    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  Last A1c reviewed-   Lab Results   Component Value Date    HGBA1C 6.5 (H) 02/07/2024     Most recent fingerstick glucose reviewed- No results for input(s): "POCTGLUCOSE" in the last 24 hours.  Current correctional scale  Low  Maintain anti-hyperglycemic dose as follows-   Antihyperglycemics (From admission, onward)      Start     Stop Route Frequency Ordered    04/17/24 2215  insulin aspart U-100 pen 0-5 Units         -- SubQ Before meals & nightly PRN 04/17/24 2116          Hold Oral hypoglycemics while patient is in the hospital.      VTE Risk Mitigation (From admission, onward)           Ordered     enoxaparin injection 40 mg  Daily         04/17/24 2116     IP VTE HIGH RISK PATIENT  Once         04/17/24 2116     Place sequential compression device  Until discontinued         04/17/24 2116                       Critical care time spent on the evaluation and treatment of severe organ dysfunction, review of pertinent labs and imaging studies, discussions with consulting providers and discussions with patient/family: 35 minutes.          AdmissionCare    Guideline: Hypertension - INPT, Inpatient    Based on the indications selected for the patient, the bed status of Inpatient was determined to be MET    The following indications were selected as present at the time of evaluation of the patient:      - Hypertensive emergency indicated by SBP greater than 180 mm Hg or DBP greater than 120 mm Hg with evidence of acute or worsening target organ damage, as indicated by 1 or more of the following:    - Hypertensive encephalopathy (eg, Altered mental status)    - Heart failure (eg, pulmonary edema). See Heart Failure " guideline.    AdmissionCare documentation entered by: ANSON Choi    [facility], 27th edition, Copyright © 2023 [facility], [facility] All Rights Reserved.  [id number]:09:50-05:00    Ken Giordano MD  Department of Hospital Medicine  [facility] [place] - Emergency Dept

## 2024-04-18 NOTE — ASSESSMENT & PLAN NOTE
Patient with acute kidney injury/acute renal failure likely due to  possibly cardiorenal  KISHAN is currently stable. Baseline creatinine  0.9  - Labs reviewed- Renal function/electrolytes with Estimated Creatinine Clearance: 37.5 mL/min (A) (based on SCr of 1.6 mg/dL (H)). according to latest data. Monitor urine output and serial BMP and adjust therapy as needed. Avoid nephrotoxins and renally dose meds for GFR listed above.

## 2024-04-18 NOTE — NURSING
Car assumed. Pt alert and orientated x4. Respirations even and unlabored. Denies pain, HA, SOB at present. +3 pitting edema noted to BLE. Pt connected to cardiac monitor, BP cuff. Oriented to room, call light system. Family at bedside.

## 2024-04-18 NOTE — ASSESSMENT & PLAN NOTE
Patient has hypokalemia which is Acute and currently controlled. Most recent potassium levels reviewed-   Lab Results   Component Value Date    K 3.0 (L) 04/17/2024   . Will continue potassium replacement per protocol and recheck repeat levels after replacement completed.

## 2024-04-18 NOTE — HPI
This is a 64-year-old female with a past medical history of hypertension, type 2 diabetes, hyperlipidemia, depression, who presents with leg swelling.     Patient presents for evaluation of lower extremity swelling that started about 2 weeks prior to presentation.  Patient reports intermittent nausea since she was started on Trulicity, but had several changes to her medications recently.  Her losartan was switched to losartan-hydrochlorothiazide, and her Lexapro was switched to Zoloft.  Due to concerns of side effects, she discontinued her medications and has not been taking most of them for about 2 weeks.  She denies having dyspnea or orthopnea and uses compression stockings/leg elevation to help with her lower extremity swelling.    In the ED, the patient was hypertensive (233/199, MAP: 167).  Labs remarkable for elevated creatinine (1.6-baseline of 0.9), elevated BNP (200), normocytic anemia (10.1), hypokalemia (3.0).  Chest x-ray showed possible pericardial effusion, with no other acute cardiopulmonary process.  Patient was given hydralazine 10 mg IV x1, enalaprilat 2.5 mg IV, potassium bicarbonate 50 mEq and was started on a nicardipine drip.  She was admitted for further management.

## 2024-04-23 ENCOUNTER — PATIENT OUTREACH (OUTPATIENT)
Dept: ADMINISTRATIVE | Facility: CLINIC | Age: 64
End: 2024-04-23
Payer: COMMERCIAL

## 2024-04-23 ENCOUNTER — TELEPHONE (OUTPATIENT)
Dept: FAMILY MEDICINE | Facility: CLINIC | Age: 64
End: 2024-04-23
Payer: COMMERCIAL

## 2024-04-23 ENCOUNTER — PATIENT MESSAGE (OUTPATIENT)
Dept: ADMINISTRATIVE | Facility: CLINIC | Age: 64
End: 2024-04-23
Payer: COMMERCIAL

## 2024-04-23 NOTE — TELEPHONE ENCOUNTER
LM informing pt hosp f/u has been scheduled for Friday 4/26 at 9:40 with Dr Ocasio; please call office if need to change

## 2024-04-23 NOTE — PROGRESS NOTES
C3 nurse attempted to contact Machelle Morin for a TCC post hospital discharge follow up call. No answer. Left voicemail with callback information. The patient does not have a scheduled HOSFU appointment. Message sent to PCP staff for assistance with scheduling visit with patient. The patient has a previously scheduled 3 month follow up with Tricia Ocasio MD on 5/15/24, but this is not in the 5-7 days window for HOSFU.

## 2024-04-23 NOTE — PROGRESS NOTES
C3 nurse spoke with Machelle Morin for a TCC post hospital discharge follow up call. The patient has a scheduled [facility] appointment with Tricia Ocasio MD (PCP) on 4/26/24 @ 9:40am.

## 2024-04-26 ENCOUNTER — LAB VISIT (OUTPATIENT)
Dept: LAB | Facility: HOSPITAL | Age: 64
End: 2024-04-26
Attending: INTERNAL MEDICINE
Payer: COMMERCIAL

## 2024-04-26 ENCOUNTER — PATIENT MESSAGE (OUTPATIENT)
Dept: ADMINISTRATIVE | Facility: HOSPITAL | Age: 64
End: 2024-04-26
Payer: COMMERCIAL

## 2024-04-26 ENCOUNTER — TELEPHONE (OUTPATIENT)
Dept: ADMINISTRATIVE | Facility: HOSPITAL | Age: 64
End: 2024-04-26
Payer: COMMERCIAL

## 2024-04-26 ENCOUNTER — OFFICE VISIT (OUTPATIENT)
Dept: FAMILY MEDICINE | Facility: CLINIC | Age: 64
End: 2024-04-26
Payer: COMMERCIAL

## 2024-04-26 ENCOUNTER — PATIENT OUTREACH (OUTPATIENT)
Dept: ADMINISTRATIVE | Facility: HOSPITAL | Age: 64
End: 2024-04-26
Payer: COMMERCIAL

## 2024-04-26 VITALS
RESPIRATION RATE: 18 BRPM | OXYGEN SATURATION: 97 % | WEIGHT: 199.94 LBS | BODY MASS INDEX: 33.31 KG/M2 | SYSTOLIC BLOOD PRESSURE: 144 MMHG | HEART RATE: 73 BPM | DIASTOLIC BLOOD PRESSURE: 86 MMHG | TEMPERATURE: 98 F | HEIGHT: 65 IN

## 2024-04-26 DIAGNOSIS — I10 BENIGN ESSENTIAL HTN: Primary | ICD-10-CM

## 2024-04-26 DIAGNOSIS — D69.6 THROMBOCYTOPENIA, UNSPECIFIED: ICD-10-CM

## 2024-04-26 DIAGNOSIS — E87.6 HYPOKALEMIA: ICD-10-CM

## 2024-04-26 DIAGNOSIS — Z23 NEED FOR PNEUMOCOCCAL VACCINE: ICD-10-CM

## 2024-04-26 DIAGNOSIS — Z23 NEED FOR SHINGLES VACCINE: ICD-10-CM

## 2024-04-26 DIAGNOSIS — N17.9 AKI (ACUTE KIDNEY INJURY): ICD-10-CM

## 2024-04-26 DIAGNOSIS — F33.1 MODERATE EPISODE OF RECURRENT MAJOR DEPRESSIVE DISORDER: ICD-10-CM

## 2024-04-26 DIAGNOSIS — R11.0 NAUSEA: ICD-10-CM

## 2024-04-26 DIAGNOSIS — E11.3413 TYPE 2 DIABETES MELLITUS WITH BOTH EYES AFFECTED BY SEVERE NONPROLIFERATIVE RETINOPATHY AND MACULAR EDEMA, WITHOUT LONG-TERM CURRENT USE OF INSULIN: ICD-10-CM

## 2024-04-26 DIAGNOSIS — M79.89 LEG SWELLING: ICD-10-CM

## 2024-04-26 DIAGNOSIS — Z12.11 ENCOUNTER FOR COLORECTAL CANCER SCREENING: ICD-10-CM

## 2024-04-26 DIAGNOSIS — Z12.12 ENCOUNTER FOR COLORECTAL CANCER SCREENING: ICD-10-CM

## 2024-04-26 DIAGNOSIS — Z12.31 ENCOUNTER FOR SCREENING MAMMOGRAM FOR BREAST CANCER: ICD-10-CM

## 2024-04-26 PROBLEM — I16.1 HYPERTENSIVE EMERGENCY: Status: RESOLVED | Noted: 2024-04-17 | Resolved: 2024-04-26

## 2024-04-26 LAB
ANION GAP SERPL CALC-SCNC: 8 MMOL/L (ref 8–16)
BUN SERPL-MCNC: 30 MG/DL (ref 8–23)
CALCIUM SERPL-MCNC: 8.4 MG/DL (ref 8.7–10.5)
CHLORIDE SERPL-SCNC: 104 MMOL/L (ref 95–110)
CO2 SERPL-SCNC: 28 MMOL/L (ref 23–29)
CREAT SERPL-MCNC: 1.4 MG/DL (ref 0.5–1.4)
EST. GFR  (NO RACE VARIABLE): 41.8 ML/MIN/1.73 M^2
GLUCOSE SERPL-MCNC: 185 MG/DL (ref 70–110)
POTASSIUM SERPL-SCNC: 3.1 MMOL/L (ref 3.5–5.1)
SODIUM SERPL-SCNC: 140 MMOL/L (ref 136–145)

## 2024-04-26 PROCEDURE — 90750 HZV VACC RECOMBINANT IM: CPT | Mod: S$GLB,,, | Performed by: INTERNAL MEDICINE

## 2024-04-26 PROCEDURE — 90471 IMMUNIZATION ADMIN: CPT | Mod: S$GLB,,, | Performed by: INTERNAL MEDICINE

## 2024-04-26 PROCEDURE — 1160F RVW MEDS BY RX/DR IN RCRD: CPT | Mod: CPTII,S$GLB,, | Performed by: INTERNAL MEDICINE

## 2024-04-26 PROCEDURE — 90677 PCV20 VACCINE IM: CPT | Mod: S$GLB,,, | Performed by: INTERNAL MEDICINE

## 2024-04-26 PROCEDURE — 99214 OFFICE O/P EST MOD 30 MIN: CPT | Mod: 25,S$GLB,, | Performed by: INTERNAL MEDICINE

## 2024-04-26 PROCEDURE — 36415 COLL VENOUS BLD VENIPUNCTURE: CPT | Mod: PO | Performed by: INTERNAL MEDICINE

## 2024-04-26 PROCEDURE — 3044F HG A1C LEVEL LT 7.0%: CPT | Mod: CPTII,S$GLB,, | Performed by: INTERNAL MEDICINE

## 2024-04-26 PROCEDURE — 1111F DSCHRG MED/CURRENT MED MERGE: CPT | Mod: CPTII,S$GLB,, | Performed by: INTERNAL MEDICINE

## 2024-04-26 PROCEDURE — 80048 BASIC METABOLIC PNL TOTAL CA: CPT | Performed by: INTERNAL MEDICINE

## 2024-04-26 PROCEDURE — 3288F FALL RISK ASSESSMENT DOCD: CPT | Mod: CPTII,S$GLB,, | Performed by: INTERNAL MEDICINE

## 2024-04-26 PROCEDURE — 3077F SYST BP >= 140 MM HG: CPT | Mod: CPTII,S$GLB,, | Performed by: INTERNAL MEDICINE

## 2024-04-26 PROCEDURE — 90472 IMMUNIZATION ADMIN EACH ADD: CPT | Mod: S$GLB,,, | Performed by: INTERNAL MEDICINE

## 2024-04-26 PROCEDURE — 1101F PT FALLS ASSESS-DOCD LE1/YR: CPT | Mod: CPTII,S$GLB,, | Performed by: INTERNAL MEDICINE

## 2024-04-26 PROCEDURE — 4010F ACE/ARB THERAPY RXD/TAKEN: CPT | Mod: CPTII,S$GLB,, | Performed by: INTERNAL MEDICINE

## 2024-04-26 PROCEDURE — 99999 PR PBB SHADOW E&M-EST. PATIENT-LVL V: CPT | Mod: PBBFAC,,, | Performed by: INTERNAL MEDICINE

## 2024-04-26 PROCEDURE — 1159F MED LIST DOCD IN RCRD: CPT | Mod: CPTII,S$GLB,, | Performed by: INTERNAL MEDICINE

## 2024-04-26 PROCEDURE — 3079F DIAST BP 80-89 MM HG: CPT | Mod: CPTII,S$GLB,, | Performed by: INTERNAL MEDICINE

## 2024-04-26 PROCEDURE — 3008F BODY MASS INDEX DOCD: CPT | Mod: CPTII,S$GLB,, | Performed by: INTERNAL MEDICINE

## 2024-04-26 RX ORDER — LOSARTAN POTASSIUM 50 MG/1
50 TABLET ORAL
COMMUNITY
Start: 2024-03-12 | End: 2024-04-26 | Stop reason: DRUGHIGH

## 2024-04-26 RX ORDER — ESCITALOPRAM OXALATE 20 MG/1
20 TABLET ORAL DAILY
Qty: 90 TABLET | Refills: 3 | Status: SHIPPED | OUTPATIENT
Start: 2024-04-26 | End: 2025-04-26

## 2024-04-26 RX ORDER — ONDANSETRON 4 MG/1
4 TABLET, ORALLY DISINTEGRATING ORAL EVERY 12 HOURS PRN
Qty: 60 TABLET | Refills: 0 | Status: SHIPPED | OUTPATIENT
Start: 2024-04-26 | End: 2024-05-26

## 2024-04-26 NOTE — PROGRESS NOTES
"Chief Complaint: Hospital Follow Up      Machelle Morin  is a 64 y.o. year old patient who presents today for hospital follow up     Was admitted for hypertensive emergency. Had stopped taking all her medication. (Refer to discharge summary)  Patient now taking losartan BID and coref OD. Has been taking lasix BID. Not using compression stockings.  Reports having a "stuck" feeling in her throat with Zoloft. Stopped taking it and her symptoms have improved.     Past Medical History:   Diagnosis Date    Diabetes mellitus     Hypertension     Hypertensive emergency 2024       Past Surgical History:   Procedure Laterality Date     SECTION      ENDOMETRIAL ABLATION      OVARIAN CYST REMOVAL          Family History   Problem Relation Name Age of Onset    Alcohol abuse Father      Miscarriages / Stillbirths Mother      Hypertension Mother      Cancer Mother      Arthritis Mother      Pancreatic cancer Mother  83    Early death Brother      Early death Brother      Miscarriages / Stillbirths Sister      Asthma Sister      Asthma Daughter      Colon cancer Neg Hx      Diabetes Neg Hx      Stroke Neg Hx          Social History     Socioeconomic History    Marital status:     Number of children: 1   Tobacco Use    Smoking status: Never     Passive exposure: Never    Smokeless tobacco: Never   Substance and Sexual Activity    Alcohol use: Yes     Alcohol/week: 1.0 standard drink of alcohol     Types: 1 Glasses of wine per week     Comment: socially    Drug use: Never    Sexual activity: Not Currently     Partners: Male     Comment:  since 2020 after 38 years marriage     Social Determinants of Health     Financial Resource Strain: Low Risk  (2023)    Overall Financial Resource Strain (CARDIA)     Difficulty of Paying Living Expenses: Not very hard   Food Insecurity: No Food Insecurity (2023)    Hunger Vital Sign     Worried About Running Out of Food in the Last Year: Never true     " Ran Out of Food in the Last Year: Never true   Transportation Needs: No Transportation Needs (11/11/2023)    PRAPARE - Transportation     Lack of Transportation (Medical): No     Lack of Transportation (Non-Medical): No   Physical Activity: Inactive (11/11/2023)    Exercise Vital Sign     Days of Exercise per Week: 0 days     Minutes of Exercise per Session: 0 min   Stress: Stress Concern Present (11/11/2023)    [employer] [place] [employer] - Occupational Stress Questionnaire     Feeling of Stress : Very much   Social Connections: Unknown (11/11/2023)    Social Connection and Isolation Panel [NHANES]     Frequency of Communication with Friends and Family: More than three times a week     Frequency of Social Gatherings with Friends and Family: More than three times a week     Active Member of Clubs or Organizations: Yes     Attends Club or Organization Meetings: More than 4 times per year     Marital Status:    Housing Stability: Unknown (11/11/2023)    Housing Stability Vital Sign     Unable to Pay for Housing in the Last Year: Patient refused     Number of Places Lived in the Last Year: 1     Unstable Housing in the Last Year: No         Current Outpatient Medications:     atorvastatin (LIPITOR) 40 MG tablet, Take 1 tablet (40 mg total) by mouth once daily., Disp: 90 tablet, Rfl: 3    carvediloL (COREG) 25 MG tablet, Take 1 tablet (25 mg total) by mouth 2 (two) times daily with meals., Disp: 180 tablet, Rfl: 3    dulaglutide (TRULICITY) 0.75 mg/0.5 mL pen injector, Inject 0.75 mg into the skin every 7 days., Disp: 4 pen , Rfl: 3    furosemide (LASIX) 40 MG tablet, Take 1 tablet (40 mg total) by mouth 2 (two) times daily., Disp: 60 tablet, Rfl: 2    losartan (COZAAR) 100 MG tablet, Take 1 tablet (100 mg total) by mouth once daily., Disp: 90 tablet, Rfl: 3    zolpidem (AMBIEN) 10 mg Tab, Ambien 10 mg tablet  Take 1 tablet every day by oral route., Disp: 90 tablet, Rfl: 0    EScitalopram oxalate  (LEXAPRO) 20 MG tablet, Take 1 tablet (20 mg total) by mouth once daily., Disp: 90 tablet, Rfl: 3    ondansetron (ZOFRAN-ODT) 4 MG TbDL, Take 1 tablet (4 mg total) by mouth every 12 (twelve) hours as needed (nausea)., Disp: 60 tablet, Rfl: 0  No current facility-administered medications for this visit.     Review of Systems   Cardiovascular:  Positive for leg swelling.   Gastrointestinal:  Positive for nausea.   Psychiatric/Behavioral:  Positive for depression. The patient is nervous/anxious.         Objective:      Vitals:    04/26/24 0949   BP: (!) 144/86   Pulse:    Resp:    Temp:        Physical Exam  Constitutional:       Appearance: Normal appearance. She is obese.   Musculoskeletal:      Right lower leg: Edema present.      Left lower leg: Edema present.   Neurological:      Mental Status: She is alert.          Assessment:       1. Benign essential HTN    2. Encounter for colorectal cancer screening    3. Encounter for screening mammogram for breast cancer    4. Need for shingles vaccine    5. Need for pneumococcal vaccine    6. Nausea    7. Moderate episode of recurrent major depressive disorder    8. Hypokalemia    9. KISHAN (acute kidney injury)    10. 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 Leg swelling    12. Thrombocytopenia, unspecified          Plan:   1. Benign essential HTN  Assessment & Plan:  Chronic, uncontrolled. BP: (!) 144/86 (4/26/2024  9:49 AM)    - counseled patient on lifestyle changes to reduce blood pressure including reducing salt intake, weight loss, eating plenty of fruits and vegetables, cutting down on alcohol and exercise  - counseled patient on how to take Coreg and Losartan as prescribed  - two week follow up         2. Encounter for colorectal cancer screening  -     Ambulatory referral/consult to Endo Procedure ; Future; Expected date: 04/27/2024    3.  Encounter for screening mammogram for breast cancer  -     Mammo Digital Screening Bilat; Future; Expected date: 04/26/2024    4. Need for shingles vaccine  -     varicella-zoster GE-AS01B (PF)(SHINGRIX) 50 mcg/0.5 mL KIT    5. Need for pneumococcal vaccine  -     pneumoc 20-tiffany conj-dip cr(PF) (PREVNAR-20 (PF)) injection Syrg 0.5 mL    6. Nausea  -     ondansetron (ZOFRAN-ODT) 4 MG TbDL; Take 1 tablet (4 mg total) by mouth every 12 (twelve) hours as needed (nausea).  Dispense: 60 tablet; Refill: 0    7. Moderate episode of recurrent major depressive disorder  Assessment & Plan:  Chronic, unresolved. Still dealing with family issues. Refer to HPI     - wants to go back to Lexapro. Restarted at 20mg   - continue f/up with BT and psych    Orders:  -     EScitalopram oxalate (LEXAPRO) 20 MG tablet; Take 1 tablet (20 mg total) by mouth once daily.  Dispense: 90 tablet; Refill: 3    8. Hypokalemia  Assessment & Plan:  BMP  Lab Results   Component Value Date     04/18/2024    K 3.2 (L) 04/18/2024     04/18/2024    CO2 27 04/18/2024    BUN 34 (H) 04/18/2024    CREATININE 1.6 (H) 04/18/2024    CALCIUM 8.1 (L) 04/18/2024    ANIONGAP 4 (L) 04/18/2024    EGFRNORACEVR 36 (A) 04/18/2024     - counseled patient to increase foods rich with protein   - BMP check in two weeks   - will need to adjust lasix and/or start potassium if still low    Orders:  -     Basic Metabolic Panel; Future; Expected date: 05/08/2024    9. KISHAN (acute kidney injury)  Assessment & Plan:  Likely 2/2 HTN emergency   BMP  Lab Results   Component Value Date     04/18/2024    K 3.2 (L) 04/18/2024     04/18/2024    CO2 27 04/18/2024    BUN 34 (H) 04/18/2024    CREATININE 1.6 (H) 04/18/2024    CALCIUM 8.1 (L) 04/18/2024    ANIONGAP 4 (L) 04/18/2024    EGFRNORACEVR 36 (A) 04/18/2024     - recheck BMP      10. 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 Patient's last A1c was   Lab Results   Component Value Date    HGBA1C 6.5 (H) 02/07/2024    .     -  recommended a diet with reduced processed carbs like rice and white bread, reduce sugar/dessert intake and encouraged weight loss.   - continue Trulicity  - zofran PRN        11. Leg swelling  Assessment & Plan:  Chronic, unresolved, bilateral LE edema +2    - advised to use compression stockings   - to keep legs elevated when sitting   - try to stay active and avoid prolonged sitting if possible  - continue lasix BID, counseled on tapering to daily and as needed if improves         12. Thrombocytopenia, unspecified  Assessment & Plan:  Chronic, stable   Lab Results   Component Value Date    WBC 5.39 04/18/2024    HGB 8.8 (L) 04/18/2024    HCT 27.0 (L) 04/18/2024    MCV 91 04/18/2024     (L) 04/18/2024     - continue to monitor            No follow-ups on file.

## 2024-04-26 NOTE — TELEPHONE ENCOUNTER
----- Message from Tricia Ocasio MD sent at 4/26/2024 10:29 AM CDT -----  Regarding: Colonoscopy  Hello, I have placed an Endo Scheduling Referral. Please help schedule for patient. Thank you!     Best,   Dr. Ocasio

## 2024-04-26 NOTE — PATIENT INSTRUCTIONS
Please consume potassium rich foods such as bananas, potatoes, squash, dried apricots, swiss chard and beans.

## 2024-04-26 NOTE — ASSESSMENT & PLAN NOTE
Chronic, uncontrolled. BP: (!) 144/86 (4/26/2024  9:49 AM)    - counseled patient on lifestyle changes to reduce blood pressure including reducing salt intake, weight loss, eating plenty of fruits and vegetables, cutting down on alcohol and exercise  - counseled patient on how to take Coreg and Losartan as prescribed  - two week follow up

## 2024-04-26 NOTE — ASSESSMENT & PLAN NOTE
Chronic, controlled. Patient's last A1c was   Lab Results   Component Value Date    HGBA1C 6.5 (H) 02/07/2024    .     -  recommended a diet with reduced processed carbs like rice and white bread, reduce sugar/dessert intake and encouraged weight loss.   - continue Trulicity  - zofran PRN

## 2024-04-26 NOTE — ASSESSMENT & PLAN NOTE
Chronic, stable   Lab Results   Component Value Date    WBC 5.39 04/18/2024    HGB 8.8 (L) 04/18/2024    HCT 27.0 (L) 04/18/2024    MCV 91 04/18/2024     (L) 04/18/2024     - continue to monitor

## 2024-04-26 NOTE — PROGRESS NOTES
Health Maintenance Due   Topic     Pneumococcal Vaccines (Age 65+) (1 of 2 - PCV) Pt agree to get today    DEXA Scan  scheduled    Colorectal Cancer Screening  Information received.      RSV Vaccine (Age 60+ and Pregnant patients) (1 - 1-dose 60+ series) Not offered at this Facility    COVID-19 Vaccine (6 - 2023-24 season) Not offered at this Facility    Shingles Vaccine (2 of 2) Pt agree to get today    Mammogram  Pt will schedule in portal

## 2024-04-26 NOTE — ASSESSMENT & PLAN NOTE
Chronic, unresolved, bilateral LE edema +2    - advised to use compression stockings   - to keep legs elevated when sitting   - try to stay active and avoid prolonged sitting if possible  - continue lasix BID, counseled on tapering to daily and as needed if improves

## 2024-04-26 NOTE — ASSESSMENT & PLAN NOTE
BMP  Lab Results   Component Value Date     04/18/2024    K 3.2 (L) 04/18/2024     04/18/2024    CO2 27 04/18/2024    BUN 34 (H) 04/18/2024    CREATININE 1.6 (H) 04/18/2024    CALCIUM 8.1 (L) 04/18/2024    ANIONGAP 4 (L) 04/18/2024    EGFRNORACEVR 36 (A) 04/18/2024     - counseled patient to increase foods rich with protein   - BMP check in two weeks   - will need to adjust lasix and/or start potassium if still low

## 2024-04-26 NOTE — ASSESSMENT & PLAN NOTE
Chronic, unresolved. Still dealing with family issues. Refer to HPI     - wants to go back to Lexapro. Restarted at 20mg   - continue f/up with BT and psych

## 2024-04-26 NOTE — PROGRESS NOTES
Population Health Chart Review & Patient Outreach Details      Additional Pop Health Notes:    LM AND SENT PORTAL MESSAGE TO HELP WITH SCHEDULE AUDIO VISIT FOR [facility]ONSOCOPY.           Updates Requested / Reviewed:      Updated Care Coordination Note and Care Everywhere         Health Maintenance Topics Overdue:      VBHM Score: 2     Colon Cancer Screening  Uncontrolled BP    RSV Vaccine                  Health Maintenance Topic(s) Outreach Outcomes & Actions Taken:    Colorectal Cancer Screening - Outreach Outcomes & Actions Taken  : LM AND SENT PORTAL MESSAGE.

## 2024-04-26 NOTE — ASSESSMENT & PLAN NOTE
Likely 2/2 HTN emergency   BMP  Lab Results   Component Value Date     04/18/2024    K 3.2 (L) 04/18/2024     04/18/2024    CO2 27 04/18/2024    BUN 34 (H) 04/18/2024    CREATININE 1.6 (H) 04/18/2024    CALCIUM 8.1 (L) 04/18/2024    ANIONGAP 4 (L) 04/18/2024    EGFRNORACEVR 36 (A) 04/18/2024     - recheck BMP

## 2024-04-29 DIAGNOSIS — E87.6 HYPOKALEMIA: Primary | ICD-10-CM

## 2024-04-29 RX ORDER — POTASSIUM CHLORIDE 20 MEQ/1
20 TABLET, EXTENDED RELEASE ORAL DAILY
Qty: 30 TABLET | Refills: 0 | Status: SHIPPED | OUTPATIENT
Start: 2024-04-29 | End: 2024-05-29

## 2024-05-08 DIAGNOSIS — G47.09 OTHER INSOMNIA: ICD-10-CM

## 2024-05-08 RX ORDER — ZOLPIDEM TARTRATE 10 MG/1
TABLET ORAL
Qty: 8 TABLET | Refills: 0 | Status: SHIPPED | OUTPATIENT
Start: 2024-05-08 | End: 2024-05-15

## 2024-05-08 NOTE — TELEPHONE ENCOUNTER
No care due was identified.  [facility] Embedded Care Due Messages. Reference number: [id number].   5/08/2024 9:32:05 AM CDT

## 2024-05-15 ENCOUNTER — OFFICE VISIT (OUTPATIENT)
Dept: FAMILY MEDICINE | Facility: CLINIC | Age: 64
End: 2024-05-15
Payer: COMMERCIAL

## 2024-05-15 VITALS
TEMPERATURE: 98 F | BODY MASS INDEX: 36 KG/M2 | SYSTOLIC BLOOD PRESSURE: 160 MMHG | OXYGEN SATURATION: 96 % | HEART RATE: 75 BPM | HEIGHT: 65 IN | DIASTOLIC BLOOD PRESSURE: 100 MMHG | WEIGHT: 216.06 LBS

## 2024-05-15 DIAGNOSIS — E11.3413 TYPE 2 DIABETES MELLITUS WITH BOTH EYES AFFECTED BY SEVERE NONPROLIFERATIVE RETINOPATHY AND MACULAR EDEMA, WITHOUT LONG-TERM CURRENT USE OF INSULIN: ICD-10-CM

## 2024-05-15 DIAGNOSIS — E87.6 HYPOKALEMIA: ICD-10-CM

## 2024-05-15 DIAGNOSIS — E66.01 SEVERE OBESITY (BMI 35.0-39.9) WITH COMORBIDITY: ICD-10-CM

## 2024-05-15 DIAGNOSIS — Z12.11 COLON CANCER SCREENING: ICD-10-CM

## 2024-05-15 DIAGNOSIS — M79.89 LEG SWELLING: ICD-10-CM

## 2024-05-15 DIAGNOSIS — Z12.31 BREAST CANCER SCREENING BY MAMMOGRAM: ICD-10-CM

## 2024-05-15 DIAGNOSIS — Z78.0 ASYMPTOMATIC MENOPAUSAL STATE: ICD-10-CM

## 2024-05-15 DIAGNOSIS — I10 BENIGN ESSENTIAL HTN: Primary | ICD-10-CM

## 2024-05-15 PROBLEM — E66.811 CLASS 1 OBESITY DUE TO EXCESS CALORIES WITH SERIOUS COMORBIDITY AND BODY MASS INDEX (BMI) OF 30.0 TO 30.9 IN ADULT: Status: RESOLVED | Noted: 2023-06-06 | Resolved: 2024-05-15

## 2024-05-15 PROBLEM — I50.31 ACUTE DIASTOLIC CONGESTIVE HEART FAILURE: Status: RESOLVED | Noted: 2024-04-17 | Resolved: 2024-05-15

## 2024-05-15 PROBLEM — N17.9 AKI (ACUTE KIDNEY INJURY): Status: RESOLVED | Noted: 2024-04-17 | Resolved: 2024-05-15

## 2024-05-15 PROBLEM — E66.09 CLASS 1 OBESITY DUE TO EXCESS CALORIES WITH SERIOUS COMORBIDITY AND BODY MASS INDEX (BMI) OF 30.0 TO 30.9 IN ADULT: Status: RESOLVED | Noted: 2023-06-06 | Resolved: 2024-05-15

## 2024-05-15 PROCEDURE — 4010F ACE/ARB THERAPY RXD/TAKEN: CPT | Mod: CPTII,S$GLB,, | Performed by: INTERNAL MEDICINE

## 2024-05-15 PROCEDURE — 3008F BODY MASS INDEX DOCD: CPT | Mod: CPTII,S$GLB,, | Performed by: INTERNAL MEDICINE

## 2024-05-15 PROCEDURE — 3044F HG A1C LEVEL LT 7.0%: CPT | Mod: CPTII,S$GLB,, | Performed by: INTERNAL MEDICINE

## 2024-05-15 PROCEDURE — 99214 OFFICE O/P EST MOD 30 MIN: CPT | Mod: S$GLB,,, | Performed by: INTERNAL MEDICINE

## 2024-05-15 PROCEDURE — 1101F PT FALLS ASSESS-DOCD LE1/YR: CPT | Mod: CPTII,S$GLB,, | Performed by: INTERNAL MEDICINE

## 2024-05-15 PROCEDURE — 1159F MED LIST DOCD IN RCRD: CPT | Mod: CPTII,S$GLB,, | Performed by: INTERNAL MEDICINE

## 2024-05-15 PROCEDURE — 3077F SYST BP >= 140 MM HG: CPT | Mod: CPTII,S$GLB,, | Performed by: INTERNAL MEDICINE

## 2024-05-15 PROCEDURE — 3080F DIAST BP >= 90 MM HG: CPT | Mod: CPTII,S$GLB,, | Performed by: INTERNAL MEDICINE

## 2024-05-15 PROCEDURE — 1160F RVW MEDS BY RX/DR IN RCRD: CPT | Mod: CPTII,S$GLB,, | Performed by: INTERNAL MEDICINE

## 2024-05-15 PROCEDURE — 3288F FALL RISK ASSESSMENT DOCD: CPT | Mod: CPTII,S$GLB,, | Performed by: INTERNAL MEDICINE

## 2024-05-15 PROCEDURE — 99999 PR PBB SHADOW E&M-EST. PATIENT-LVL V: CPT | Mod: PBBFAC,,, | Performed by: INTERNAL MEDICINE

## 2024-05-15 PROCEDURE — 1111F DSCHRG MED/CURRENT MED MERGE: CPT | Mod: CPTII,S$GLB,, | Performed by: INTERNAL MEDICINE

## 2024-05-15 RX ORDER — AMLODIPINE AND OLMESARTAN MEDOXOMIL 10; 40 MG/1; MG/1
1 TABLET ORAL DAILY
Qty: 90 TABLET | Refills: 0 | Status: SHIPPED | OUTPATIENT
Start: 2024-05-15 | End: 2024-08-13

## 2024-05-15 NOTE — ASSESSMENT & PLAN NOTE
Chronic, controlled. Patient's last A1c was   Lab Results   Component Value Date    HGBA1C 6.5 (H) 02/07/2024     -  recommended a diet with reduced processed carbs like rice and white bread, reduce sugar/dessert intake and encouraged weight loss.   - continue Trulicity  - zofran PRN

## 2024-05-15 NOTE — ASSESSMENT & PLAN NOTE
Gained 16 lbs in less than a month. Patient did not notice the change in weight. Patient does not appear heavier/edematous. Likely miscalculated?    - check weight during 2 week nursing visit  - counseled of the importance of diet and exercise. Talked about calorie deficit and intermittent fasting. Cutting off carbs. Recommended exercise at least twice a week, with increased cardio and weight lifting.

## 2024-05-15 NOTE — ASSESSMENT & PLAN NOTE
BMP  Lab Results   Component Value Date     04/26/2024    K 3.1 (L) 04/26/2024     04/26/2024    CO2 28 04/26/2024    BUN 30 (H) 04/26/2024    CREATININE 1.4 04/26/2024    CALCIUM 8.4 (L) 04/26/2024    ANIONGAP 8 04/26/2024    EGFRNORACEVR 41.8 (A) 04/26/2024     - counseled patient to increase foods rich with protein   - taper down on lasix  - cont potassium   - repeat labs in three months

## 2024-05-15 NOTE — PROGRESS NOTES
Chief Complaint: Follow-up      Machelle Morin  is a 64 y.o. year old patient who presents today for f/up     Has been taking losartan daily and Coreg BID now. Reports compliance to trulicity. Has still been taking lasix BID and potassium pills.     Past Medical History:   Diagnosis Date    Acute diastolic congestive heart failure 2024    Diabetes mellitus     Hypertension     Hypertensive emergency 2024       Past Surgical History:   Procedure Laterality Date     SECTION      ENDOMETRIAL ABLATION      OVARIAN CYST REMOVAL          Family History   Problem Relation Name Age of Onset    Alcohol abuse Father      Miscarriages / Stillbirths Mother      Hypertension Mother      Cancer Mother      Arthritis Mother      Pancreatic cancer Mother  83    Early death Brother      Early death Brother      Miscarriages / Stillbirths Sister      Asthma Sister      Asthma Daughter      Colon cancer Neg Hx      Diabetes Neg Hx      Stroke Neg Hx          Social History     Socioeconomic History    Marital status:     Number of children: 1   Tobacco Use    Smoking status: Never     Passive exposure: Never    Smokeless tobacco: Never   Substance and Sexual Activity    Alcohol use: Yes     Alcohol/week: 1.0 standard drink of alcohol     Types: 1 Glasses of wine per week     Comment: socially    Drug use: Never    Sexual activity: Not Currently     Partners: Male     Comment:  since  after 38 years marriage     Social Determinants of Health     Financial Resource Strain: Low Risk  (2023)    Overall Financial Resource Strain (CARDIA)     Difficulty of Paying Living Expenses: Not very hard   Food Insecurity: No Food Insecurity (2023)    Hunger Vital Sign     Worried About Running Out of Food in the Last Year: Never true     Ran Out of Food in the Last Year: Never true   Transportation Needs: No Transportation Needs (2023)    PRAPARE - Transportation     Lack of  Transportation (Medical): No     Lack of Transportation (Non-Medical): No   Physical Activity: Inactive (11/11/2023)    Exercise Vital Sign     Days of Exercise per Week: 0 days     Minutes of Exercise per Session: 0 min   Stress: Stress Concern Present (11/11/2023)    [facility] - Occupational Stress Questionnaire     Feeling of Stress : Very much   Housing Stability: Unknown (11/11/2023)    Housing Stability Vital Sign     Unable to Pay for Housing in the Last Year: Patient refused     Number of Places Lived in the Last Year: 1     Unstable Housing in the Last Year: No         Current Outpatient Medications:     atorvastatin (LIPITOR) 40 MG tablet, Take 1 tablet (40 mg total) by mouth once daily., Disp: 90 tablet, Rfl: 3    carvediloL (COREG) 25 MG tablet, Take 1 tablet (25 mg total) by mouth 2 (two) times daily with meals., Disp: 180 tablet, Rfl: 3    dulaglutide (TRULICITY) 0.75 mg/0.5 mL pen injector, Inject 0.75 mg into the skin every 7 days., Disp: 4 pen , Rfl: 3    EScitalopram oxalate (LEXAPRO) 20 MG tablet, Take 1 tablet (20 mg total) by mouth once daily., Disp: 90 tablet, Rfl: 3    furosemide (LASIX) 40 MG tablet, Take 1 tablet (40 mg total) by mouth 2 (two) times daily., Disp: 60 tablet, Rfl: 2    ondansetron (ZOFRAN-ODT) 4 MG TbDL, Take 1 tablet (4 mg total) by mouth every 12 (twelve) hours as needed (nausea)., Disp: 60 tablet, Rfl: 0    potassium chloride SA (K-DUR,KLOR-CON) 20 MEQ tablet, Take 1 tablet (20 mEq total) by mouth once daily., Disp: 30 tablet, Rfl: 0    zolpidem (AMBIEN) 10 mg Tab, Ambien 10 mg tablet  Take 1 tablet every day by oral route., Disp: 8 tablet, Rfl: 0    amlodipine-olmesartan (AMBER) 10-40 mg per tablet, Take 1 tablet by mouth once daily., Disp: 90 tablet, Rfl: 0     Review of Systems   Respiratory:  Negative for shortness of breath.    Cardiovascular:  Positive for leg swelling.   Psychiatric/Behavioral:  Positive for memory loss.          Objective:      Vitals:    05/15/24 0828   BP: (!) 160/100   Pulse:    Temp:        Physical Exam  Constitutional:       Appearance: Normal appearance. She is obese.   HENT:      Head: Normocephalic and atraumatic.   Skin:     General: Skin is warm and dry.   Neurological:      General: No focal deficit present.      Mental Status: She is alert and oriented to person, place, and time.   Psychiatric:         Mood and Affect: Mood normal.         Behavior: Behavior normal.          Assessment:       1. Benign essential HTN    2. Colon cancer screening    3. Asymptomatic menopausal state    4. Breast cancer screening by mammogram    5. Severe obesity (BMI 35.0-39.9) with comorbidity    6. Hypokalemia    7. 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 Leg swelling          Plan:   1. Benign essential HTN  Assessment & Plan:  Chronic, uncontrolled. BP: (!) 160/100 (5/15/2024  8:28 AM)    - counseled patient on lifestyle changes to reduce blood pressure including reducing salt intake, weight loss, eating plenty of fruits and vegetables, cutting down on alcohol and exercise  - cont Coreg  - d/c losartan, start Amber   - two week BP check  - advised to measure BP at least three times a week   - three month follow up       Orders:  -     amlodipine-olmesartan (AMBER) 10-40 mg per tablet; Take 1 tablet by mouth once daily.  Dispense: 90 tablet; Refill: 0    2. Colon cancer screening  -     Ambulatory referral/consult to Endo Procedure ; Future; Expected date: 05/16/2024    3. Asymptomatic menopausal state  -     DXA Bone Density Axial Skeleton 1 or more sites; Future; Expected date: 05/15/2024    4. Breast cancer screening by mammogram  -     Mammo Digital Screening Bilat w/ Jesus; Future; Expected date: 07/12/2024    5. Severe obesity (BMI 35.0-39.9) with comorbidity  Assessment & Plan:  Gained 16 lbs in  less than a month. Patient did not notice the change in weight. Patient does not appear heavier/edematous. Likely miscalculated?    - check weight during 2 week nursing visit  - counseled of the importance of diet and exercise. Talked about calorie deficit and intermittent fasting. Cutting off carbs. Recommended exercise at least twice a week, with increased cardio and weight lifting.         6. Hypokalemia  Assessment & Plan:  BMP  Lab Results   Component Value Date     04/26/2024    K 3.1 (L) 04/26/2024     04/26/2024    CO2 28 04/26/2024    BUN 30 (H) 04/26/2024    CREATININE 1.4 04/26/2024    CALCIUM 8.4 (L) 04/26/2024    ANIONGAP 8 04/26/2024    EGFRNORACEVR 41.8 (A) 04/26/2024     - counseled patient to increase foods rich with protein   - taper down on lasix  - cont potassium   - repeat labs in three months    Orders:  -     Basic Metabolic Panel; Future; Expected date: 08/15/2024    7. 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 Patient's last A1c was   Lab Results   Component Value Date    HGBA1C 6.5 (H) 02/07/2024     -  recommended a diet with reduced processed carbs like rice and white bread, reduce sugar/dessert intake and encouraged weight loss.   - continue Trulicity  - zofran PRN      Orders:  -     Hemoglobin A1C; Future; Expected date: 08/15/2024    8. Leg swelling  Assessment & Plan:  Chronic, unresolved, bilateral LE edema +2    - advised to use compression stockings   - to keep legs elevated when sitting   - try to stay active and avoid prolonged sitting if possible  - continue lasix BID, counseled on tapering to daily and as needed if improves              Follow up in about 3 months (around 8/15/2024) for f/up .

## 2024-05-15 NOTE — PROGRESS NOTES
Health Maintenance Due   Topic     DEXA Scan  Consult pcp    Colorectal Cancer Screening  Consult pcp    RSV Vaccine (Age 60+ and Pregnant patients) (1 - 1-dose 60+ series) Not offered at this office    COVID-19 Vaccine (6 - 2023-24 season) Not offered at this office    Mammogram  Consult pcp

## 2024-05-15 NOTE — PATIENT INSTRUCTIONS
Try to taper down on the furosemide and monitor leg swelling. The pill lasts for 6 hours. Better to take in the day   Start taking amlodipine-olmesartan (once a day). Stop losartan  Keep measuring BP at home, at least three times a week  Come back in two weeks for BP check with nurse   3 month f/up with Dr. Ocasio   Call [phone] to schedule colonoscopy

## 2024-05-15 NOTE — ASSESSMENT & PLAN NOTE
Chronic, uncontrolled. BP: (!) 160/100 (5/15/2024  8:28 AM)    - counseled patient on lifestyle changes to reduce blood pressure including reducing salt intake, weight loss, eating plenty of fruits and vegetables, cutting down on alcohol and exercise  - cont Coreg  - d/c losartan, start Abbi   - two week BP check  - advised to measure BP at least three times a week   - three month follow up

## 2024-06-04 ENCOUNTER — PATIENT MESSAGE (OUTPATIENT)
Dept: ADMINISTRATIVE | Facility: HOSPITAL | Age: 64
End: 2024-06-04
Payer: COMMERCIAL

## 2024-07-01 DIAGNOSIS — E87.6 HYPOKALEMIA: ICD-10-CM

## 2024-07-01 RX ORDER — POTASSIUM CHLORIDE 20 MEQ/1
20 TABLET, EXTENDED RELEASE ORAL DAILY
Qty: 30 TABLET | Refills: 0 | Status: SHIPPED | OUTPATIENT
Start: 2024-07-01 | End: 2024-07-01 | Stop reason: SDUPTHER

## 2024-07-01 RX ORDER — POTASSIUM CHLORIDE 20 MEQ/1
20 TABLET, EXTENDED RELEASE ORAL DAILY
Qty: 30 TABLET | Refills: 0 | Status: SHIPPED | OUTPATIENT
Start: 2024-07-01 | End: 2024-07-31

## 2024-07-01 NOTE — TELEPHONE ENCOUNTER
Last Office Visit Info:   The patient's last visit with Tricia Ocasio MD was on 5/15/2024.    The patient's last visit in current department was on 5/15/2024.        Last CBC Results:   Lab Results   Component Value Date    WBC 5.39 04/18/2024    HGB 8.8 (L) 04/18/2024    HCT 27.0 (L) 04/18/2024     (L) 04/18/2024       Last CMP Results  Lab Results   Component Value Date     04/26/2024    K 3.1 (L) 04/26/2024     04/26/2024    CO2 28 04/26/2024    BUN 30 (H) 04/26/2024    CREATININE 1.4 04/26/2024    CALCIUM 8.4 (L) 04/26/2024    ALBUMIN 2.2 (L) 04/18/2024    AST 43 (H) 04/18/2024    ALT 24 04/18/2024       Last Lipids  Lab Results   Component Value Date    CHOL 214 (H) 01/03/2024    TRIG 69 01/03/2024    HDL 74 01/03/2024    LDLCALC 126.2 01/03/2024       Last A1C  Lab Results   Component Value Date    HGBA1C 6.5 (H) 02/07/2024       Last TSH  Lab Results   Component Value Date    TSH 1.190 05/11/2023             Current Med Refills  Medication List with Changes/Refills   Current Medications    AMLODIPINE-OLMESARTAN (AMBER) 10-40 MG PER TABLET    Take 1 tablet by mouth once daily.       Start Date: 5/15/2024 End Date: 8/13/2024    ATORVASTATIN (LIPITOR) 40 MG TABLET    Take 1 tablet (40 mg total) by mouth once daily.       Start Date: 11/14/2023End Date: 11/13/2024    CARVEDILOL (COREG) 25 MG TABLET    Take 1 tablet (25 mg total) by mouth 2 (two) times daily with meals.       Start Date: 4/18/2024 End Date: 4/18/2025    DULAGLUTIDE (TRULICITY) 0.75 MG/0.5 ML PEN INJECTOR    Inject 0.75 mg into the skin every 7 days.       Start Date: 1/10/2024 End Date: 1/9/2025    ESCITALOPRAM OXALATE (LEXAPRO) 20 MG TABLET    Take 1 tablet (20 mg total) by mouth once daily.       Start Date: 4/26/2024 End Date: 4/26/2025    FUROSEMIDE (LASIX) 40 MG TABLET    Take 1 tablet (40 mg total) by mouth 2 (two) times daily.       Start Date: 4/18/2024 End Date: 7/17/2024    ZOLPIDEM (AMBIEN) 10 MG TAB    Ambien 10 mg  tablet  Take 1 tablet every day by oral route.       Start Date: 5/8/2024  End Date: 5/15/2024       Order(s) placed per written order guidelines: none    Please advise.

## 2024-07-01 NOTE — TELEPHONE ENCOUNTER
No care due was identified.  [facility] Embedded Care Due Messages. Reference number: [id number].   7/01/2024 3:51:35 PM CDT

## 2024-07-01 NOTE — TELEPHONE ENCOUNTER
No care due was identified.  [facility] Embedded Care Due Messages. Reference number: [id number].   7/01/2024 10:55:58 AM CDT

## 2024-07-22 DIAGNOSIS — E66.9 NON MORBID OBESITY, UNSPECIFIED OBESITY TYPE: ICD-10-CM

## 2024-07-22 DIAGNOSIS — E11.65 TYPE 2 DIABETES MELLITUS WITH HYPERGLYCEMIA, WITHOUT LONG-TERM CURRENT USE OF INSULIN: ICD-10-CM

## 2024-07-23 RX ORDER — DULAGLUTIDE 0.75 MG/.5ML
0.75 INJECTION, SOLUTION SUBCUTANEOUS
Qty: 4 PEN | Refills: 2 | Status: SHIPPED | OUTPATIENT
Start: 2024-07-23 | End: 2024-10-21

## 2024-07-29 DIAGNOSIS — I10 BENIGN ESSENTIAL HTN: ICD-10-CM

## 2024-07-29 NOTE — TELEPHONE ENCOUNTER
Care Due:                  Date            Visit Type   Department     Provider  --------------------------------------------------------------------------------                                EP -                              PRIMARY      [facility]  Last Visit: 05-      CARE ([facility])   MEDICINE       Tricia Ocasio                              EP -                              PRIMARY      [facility]  Next Visit: 08-      CARE ([facility])   MEDICINE       Tricia Ocasio                                                            Last  Test          Frequency    Reason                     Performed    Due Date  --------------------------------------------------------------------------------    HBA1C.......  6 months...  dulaglutide..............  02- 08-    [employer] Embedded Care Due Messages. Reference number: [id number].   7/29/2024 8:07:52 AM CDT

## 2024-07-30 RX ORDER — AMLODIPINE AND OLMESARTAN MEDOXOMIL 10; 40 MG/1; MG/1
1 TABLET ORAL DAILY
Qty: 90 TABLET | Refills: 0 | Status: SHIPPED | OUTPATIENT
Start: 2024-07-30 | End: 2024-10-28

## 2024-07-31 ENCOUNTER — PATIENT MESSAGE (OUTPATIENT)
Dept: FAMILY MEDICINE | Facility: CLINIC | Age: 64
End: 2024-07-31
Payer: COMMERCIAL

## 2024-08-05 ENCOUNTER — PATIENT MESSAGE (OUTPATIENT)
Dept: INTERNAL MEDICINE | Facility: CLINIC | Age: 64
End: 2024-08-05
Payer: COMMERCIAL

## 2024-08-06 ENCOUNTER — TELEPHONE (OUTPATIENT)
Dept: FAMILY MEDICINE | Facility: CLINIC | Age: 64
End: 2024-08-06
Payer: COMMERCIAL

## 2024-08-07 ENCOUNTER — PATIENT OUTREACH (OUTPATIENT)
Dept: ADMINISTRATIVE | Facility: HOSPITAL | Age: 64
End: 2024-08-07
Payer: COMMERCIAL

## 2024-08-07 ENCOUNTER — TELEPHONE (OUTPATIENT)
Dept: FAMILY MEDICINE | Facility: CLINIC | Age: 64
End: 2024-08-07
Payer: COMMERCIAL

## 2024-08-08 ENCOUNTER — TELEPHONE (OUTPATIENT)
Dept: FAMILY MEDICINE | Facility: CLINIC | Age: 64
End: 2024-08-08
Payer: COMMERCIAL

## 2024-08-12 ENCOUNTER — TELEPHONE (OUTPATIENT)
Dept: FAMILY MEDICINE | Facility: CLINIC | Age: 64
End: 2024-08-12
Payer: COMMERCIAL

## 2024-08-20 DIAGNOSIS — E66.9 NON MORBID OBESITY, UNSPECIFIED OBESITY TYPE: ICD-10-CM

## 2024-08-20 DIAGNOSIS — E11.65 TYPE 2 DIABETES MELLITUS WITH HYPERGLYCEMIA, WITHOUT LONG-TERM CURRENT USE OF INSULIN: ICD-10-CM

## 2024-08-20 RX ORDER — DULAGLUTIDE 0.75 MG/.5ML
0.75 INJECTION, SOLUTION SUBCUTANEOUS
Qty: 4 PEN | Refills: 0 | Status: SHIPPED | OUTPATIENT
Start: 2024-08-20 | End: 2024-11-18

## 2024-08-20 NOTE — TELEPHONE ENCOUNTER
No care due was identified.  [facility] Embedded Care Due Messages. Reference number: [id number].   8/20/2024 3:27:14 PM CDT

## 2024-08-20 NOTE — TELEPHONE ENCOUNTER
----- Message from Rasheed Hair sent at 8/20/2024  3:17 PM CDT -----  Regarding: self  Type: RX Refill Request    Who Called:self    Have you contacted your pharmacy:no    Refill    RX Name and Strength:dulaglutide (TRULICITY) 0.75 mg/0.5 mL pen injector    Preferred Pharmacy with phone number:  [facility][id number] - [place] - [address] AT [facility] [address]  [address]  [place] [address]  Phone: [phone] Fax: [phone]      Local or Mail Order:local    Would the patient rather a call back or a response via [facility]? callback    Best Call Back Number:[phone]    Additional Information:

## 2024-08-20 NOTE — TELEPHONE ENCOUNTER
Last Office Visit Info:   The patient's last visit with Tricia Ocasio MD was on 5/15/2024.    The patient's last visit in current department was on 5/15/2024.        Last CBC Results:   Lab Results   Component Value Date    WBC 5.39 04/18/2024    HGB 8.8 (L) 04/18/2024    HCT 27.0 (L) 04/18/2024     (L) 04/18/2024       Last CMP Results  Lab Results   Component Value Date     04/26/2024    K 3.1 (L) 04/26/2024     04/26/2024    CO2 28 04/26/2024    BUN 30 (H) 04/26/2024    CREATININE 1.4 04/26/2024    CALCIUM 8.4 (L) 04/26/2024    ALBUMIN 2.2 (L) 04/18/2024    AST 43 (H) 04/18/2024    ALT 24 04/18/2024       Last Lipids  Lab Results   Component Value Date    CHOL 214 (H) 01/03/2024    TRIG 69 01/03/2024    HDL 74 01/03/2024    LDLCALC 126.2 01/03/2024       Last A1C  Lab Results   Component Value Date    HGBA1C 6.5 (H) 02/07/2024       Last TSH  Lab Results   Component Value Date    TSH 1.190 05/11/2023             Current Med Refills  Medication List with Changes/Refills   Current Medications    AMLODIPINE-OLMESARTAN (AMBER) 10-40 MG PER TABLET    Take 1 tablet by mouth once daily.       Start Date: 7/30/2024 End Date: 10/28/2024    ATORVASTATIN (LIPITOR) 40 MG TABLET    Take 1 tablet (40 mg total) by mouth once daily.       Start Date: 11/14/2023End Date: 11/13/2024    CARVEDILOL (COREG) 25 MG TABLET    Take 1 tablet (25 mg total) by mouth 2 (two) times daily with meals.       Start Date: 4/18/2024 End Date: 4/18/2025    DULAGLUTIDE (TRULICITY) 0.75 MG/0.5 ML PEN INJECTOR    Inject 0.75 mg into the skin every 7 days.       Start Date: 7/23/2024 End Date: 10/21/2024    ESCITALOPRAM OXALATE (LEXAPRO) 20 MG TABLET    Take 1 tablet (20 mg total) by mouth once daily.       Start Date: 4/26/2024 End Date: 4/26/2025    FUROSEMIDE (LASIX) 40 MG TABLET    Take 1 tablet (40 mg total) by mouth 2 (two) times daily.       Start Date: 4/18/2024 End Date: 7/17/2024    ZOLPIDEM (AMBIEN) 10 MG TAB    Ambien 10 mg  tablet  Take 1 tablet every day by oral route.       Start Date: 5/8/2024  End Date: 5/15/2024       Order(s) placed per written order guidelines: none    Please advise.

## 2024-09-03 ENCOUNTER — TELEPHONE (OUTPATIENT)
Dept: FAMILY MEDICINE | Facility: CLINIC | Age: 64
End: 2024-09-03
Payer: COMMERCIAL

## 2024-09-09 DIAGNOSIS — E66.9 NON MORBID OBESITY, UNSPECIFIED OBESITY TYPE: ICD-10-CM

## 2024-09-09 DIAGNOSIS — G47.09 OTHER INSOMNIA: ICD-10-CM

## 2024-09-09 DIAGNOSIS — E11.65 TYPE 2 DIABETES MELLITUS WITH HYPERGLYCEMIA, WITHOUT LONG-TERM CURRENT USE OF INSULIN: ICD-10-CM

## 2024-09-09 DIAGNOSIS — I10 BENIGN ESSENTIAL HTN: ICD-10-CM

## 2024-09-09 NOTE — TELEPHONE ENCOUNTER
----- Message from Veda Gracia MA sent at 9/9/2024  2:57 PM CDT -----  Type: RX Refill Request    Who Called:  Pt   Have you contacted your pharmacy:    Refill    RX Name and Strength:  dulaglutide (TRULICITY) 0.75 mg/0.5 mL pen injector  amlodipine-olmesartan (AMBER) 10-40 mg per tablet  zolpidem (AMBIEN) 10 mg Tab  Preferred Pharmacy with phone number:    [facility] #[id number] - [place] - [address] [facility] [address]  [address] [place] [address]  Phone: [phone] Fax: [phone]     Local or Mail Order:  Local   Would the patient rather a call back or a response via [facility]?  Gisele   Best Call Back Number:  Telephone Information:  Mobile          [phone]     Additional Information:    Thank you.

## 2024-09-09 NOTE — TELEPHONE ENCOUNTER
Last Office Visit Info:   The patient's last visit with Tricia Ocasio MD was on 5/15/2024.    The patient's last visit in current department was on 5/15/2024.        Last CBC Results:   Lab Results   Component Value Date    WBC 5.39 04/18/2024    HGB 8.8 (L) 04/18/2024    HCT 27.0 (L) 04/18/2024     (L) 04/18/2024       Last CMP Results  Lab Results   Component Value Date     04/26/2024    K 3.1 (L) 04/26/2024     04/26/2024    CO2 28 04/26/2024    BUN 30 (H) 04/26/2024    CREATININE 1.4 04/26/2024    CALCIUM 8.4 (L) 04/26/2024    ALBUMIN 2.2 (L) 04/18/2024    AST 43 (H) 04/18/2024    ALT 24 04/18/2024       Last Lipids  Lab Results   Component Value Date    CHOL 214 (H) 01/03/2024    TRIG 69 01/03/2024    HDL 74 01/03/2024    LDLCALC 126.2 01/03/2024       Last A1C  Lab Results   Component Value Date    HGBA1C 6.5 (H) 02/07/2024       Last TSH  Lab Results   Component Value Date    TSH 1.190 05/11/2023             Current Med Refills  Medication List with Changes/Refills   Current Medications    AMLODIPINE-OLMESARTAN (AMBER) 10-40 MG PER TABLET    Take 1 tablet by mouth once daily.       Start Date: 7/30/2024 End Date: 10/28/2024    ATORVASTATIN (LIPITOR) 40 MG TABLET    Take 1 tablet (40 mg total) by mouth once daily.       Start Date: 11/14/2023End Date: 11/13/2024    CARVEDILOL (COREG) 25 MG TABLET    Take 1 tablet (25 mg total) by mouth 2 (two) times daily with meals.       Start Date: 4/18/2024 End Date: 4/18/2025    DULAGLUTIDE (TRULICITY) 0.75 MG/0.5 ML PEN INJECTOR    Inject 0.75 mg into the skin every 7 days.       Start Date: 8/20/2024 End Date: 11/18/2024    ESCITALOPRAM OXALATE (LEXAPRO) 20 MG TABLET    Take 1 tablet (20 mg total) by mouth once daily.       Start Date: 4/26/2024 End Date: 4/26/2025    FUROSEMIDE (LASIX) 40 MG TABLET    Take 1 tablet (40 mg total) by mouth 2 (two) times daily.       Start Date: 4/18/2024 End Date: 7/17/2024    ZOLPIDEM (AMBIEN) 10 MG TAB    Ambien 10 mg  tablet  Take 1 tablet every day by oral route.       Start Date: 5/8/2024  End Date: 5/15/2024       Order(s) placed per written order guidelines: none    Please advise.

## 2024-09-09 NOTE — TELEPHONE ENCOUNTER
No care due was identified.  [facility] Embedded Care Due Messages. Reference number: [id number].   9/09/2024 3:17:26 PM CDT

## 2024-09-10 RX ORDER — ZOLPIDEM TARTRATE 10 MG/1
TABLET ORAL
Qty: 8 TABLET | Refills: 0 | Status: SHIPPED | OUTPATIENT
Start: 2024-09-10 | End: 2024-09-16

## 2024-09-10 RX ORDER — DULAGLUTIDE 0.75 MG/.5ML
0.75 INJECTION, SOLUTION SUBCUTANEOUS
Qty: 4 PEN | Refills: 0 | Status: SHIPPED | OUTPATIENT
Start: 2024-09-10 | End: 2024-12-09

## 2024-09-10 RX ORDER — AMLODIPINE AND OLMESARTAN MEDOXOMIL 10; 40 MG/1; MG/1
1 TABLET ORAL DAILY
Qty: 90 TABLET | Refills: 0 | Status: SHIPPED | OUTPATIENT
Start: 2024-09-10 | End: 2024-12-09

## 2024-09-12 ENCOUNTER — TELEPHONE (OUTPATIENT)
Dept: FAMILY MEDICINE | Facility: CLINIC | Age: 64
End: 2024-09-12
Payer: COMMERCIAL

## 2024-09-12 NOTE — TELEPHONE ENCOUNTER
Called patient to reschedule appointment. Patient requested to cancel appointment because she no longer see Dr. Saucedo.

## 2024-09-14 ENCOUNTER — E-VISIT (OUTPATIENT)
Dept: FAMILY MEDICINE | Facility: CLINIC | Age: 64
End: 2024-09-14
Payer: COMMERCIAL

## 2024-09-14 DIAGNOSIS — M79.89 LEG SWELLING: Primary | ICD-10-CM

## 2024-09-16 ENCOUNTER — TELEPHONE (OUTPATIENT)
Dept: FAMILY MEDICINE | Facility: CLINIC | Age: 64
End: 2024-09-16
Payer: COMMERCIAL

## 2024-09-16 RX ORDER — FUROSEMIDE 40 MG/1
40 TABLET ORAL 2 TIMES DAILY
Qty: 28 TABLET | Refills: 0 | Status: SHIPPED | OUTPATIENT
Start: 2024-09-16 | End: 2024-09-30

## 2024-09-16 NOTE — TELEPHONE ENCOUNTER
----- Message from Tricia Ocasio MD sent at 9/16/2024  7:32 AM CDT -----  Regarding: Call pt  Hello, please call patient to schedule BP check and labs in two weeks. Thank you!    Best,  Dr. Ocasio

## 2024-09-16 NOTE — PROGRESS NOTES
Patient requested refill of Lasix. Requested clarification for reason for refill. Two week supply sent. Labs work and BP check ordered

## 2024-09-19 ENCOUNTER — TELEPHONE (OUTPATIENT)
Dept: FAMILY MEDICINE | Facility: CLINIC | Age: 64
End: 2024-09-19
Payer: COMMERCIAL

## 2024-09-23 ENCOUNTER — TELEPHONE (OUTPATIENT)
Dept: FAMILY MEDICINE | Facility: CLINIC | Age: 64
End: 2024-09-23
Payer: COMMERCIAL

## 2024-09-26 DIAGNOSIS — Z00.00 ENCOUNTER FOR MEDICARE ANNUAL WELLNESS EXAM: ICD-10-CM

## 2024-10-04 DIAGNOSIS — M79.89 LEG SWELLING: ICD-10-CM

## 2024-10-04 RX ORDER — FUROSEMIDE 40 MG/1
40 TABLET ORAL 2 TIMES DAILY
Qty: 28 TABLET | Refills: 0 | Status: SHIPPED | OUTPATIENT
Start: 2024-10-04 | End: 2024-10-18

## 2024-10-04 NOTE — TELEPHONE ENCOUNTER
----- Message from Lolis sent at 10/4/2024  3:48 PM CDT -----  Regarding: furosemide (LASIX) 40 MG tablet  Type:  RX Refill Request    Who Called: pt    Refill or New Rx:refill    RX Name and Strength:furosemide (LASIX) 40 MG tablet    How is the patient currently taking it? (ex. 1XDay):    Is this a 30 day or 90 day RX:    Preferred Pharmacy with phone number:[facility] #[id number] - [place], [place] - [address] AT [facility] [address]   Phone: [phone]  Fax: [phone]          Local or Mail Order:local    Ordering Provider:    Would the patient rather a call back or a response via [facility]? call    Best Call Back Number:[phone]      Additional Information:

## 2024-10-04 NOTE — TELEPHONE ENCOUNTER
Care Due:                  Date            Visit Type   Department     Provider  --------------------------------------------------------------------------------                                EP -                              PRIMARY      [facility]  Last Visit: 05-      CARE ([facility])   MEDICINE       Tricia Ocasio                              EP -                              PRIMARY      [facility]  Next Visit: 11-      CARE ([facility])   MEDICINE       Tricia Ocasio                                                            Last  Test          Frequency    Reason                     Performed    Due Date  --------------------------------------------------------------------------------    HBA1C.......  6 months...  dulaglutide..............  02- 08-    Lipid Panel.  12 months..  atorvastatin.............  01- 12-    [facility] Embedded Care Due Messages. Reference number: [id number].   10/04/2024 4:02:55 PM CDT

## 2024-10-11 DIAGNOSIS — E66.9 NON MORBID OBESITY, UNSPECIFIED OBESITY TYPE: ICD-10-CM

## 2024-10-11 DIAGNOSIS — E11.65 TYPE 2 DIABETES MELLITUS WITH HYPERGLYCEMIA, WITHOUT LONG-TERM CURRENT USE OF INSULIN: ICD-10-CM

## 2024-10-12 NOTE — TELEPHONE ENCOUNTER
Refill Routing Note   Medication(s) are not appropriate for processing by [facility] for the following reason(s):        Required labs outdated    [facility] action(s):  Defer             Appointments  past 12m or future 3m with PCP    Date Provider   Last Visit   Visit date not found Yudelka Velez MD   Next Visit   Visit date not found Yudelka Velez MD   ED visits in past 90 days: 0        Note composed:11:29 PM 10/11/2024

## 2024-10-12 NOTE — TELEPHONE ENCOUNTER
Refill Routing Note   Medication(s) are not appropriate for processing by [facility] for the following reason(s):        Required labs outdated    [facility] action(s):  Defer             Appointments  past 12m or future 3m with PCP    Date Provider   Last Visit   9/14/2024 Tricia Ocasio MD   Next Visit   11/11/2024 Tricia Ocasio MD   ED visits in past 90 days: 0        Note composed:11:29 PM 10/11/2024

## 2024-10-14 RX ORDER — DULAGLUTIDE 0.75 MG/.5ML
0.75 INJECTION, SOLUTION SUBCUTANEOUS
Qty: 4 PEN | Refills: 2 | Status: SHIPPED | OUTPATIENT
Start: 2024-10-14 | End: 2025-01-12

## 2024-10-28 DIAGNOSIS — M79.89 LEG SWELLING: ICD-10-CM

## 2024-10-28 RX ORDER — FUROSEMIDE 40 MG/1
40 TABLET ORAL 2 TIMES DAILY
Qty: 28 TABLET | Refills: 0 | Status: SHIPPED | OUTPATIENT
Start: 2024-10-28 | End: 2024-11-11

## 2024-11-11 ENCOUNTER — OFFICE VISIT (OUTPATIENT)
Dept: FAMILY MEDICINE | Facility: CLINIC | Age: 64
End: 2024-11-11
Payer: COMMERCIAL

## 2024-11-11 VITALS
TEMPERATURE: 98 F | BODY MASS INDEX: 30.3 KG/M2 | OXYGEN SATURATION: 98 % | HEIGHT: 65 IN | WEIGHT: 181.88 LBS | SYSTOLIC BLOOD PRESSURE: 190 MMHG | DIASTOLIC BLOOD PRESSURE: 108 MMHG | HEART RATE: 66 BPM

## 2024-11-11 DIAGNOSIS — M79.89 LEG SWELLING: ICD-10-CM

## 2024-11-11 DIAGNOSIS — E11.65 TYPE 2 DIABETES MELLITUS WITH HYPERGLYCEMIA, WITHOUT LONG-TERM CURRENT USE OF INSULIN: ICD-10-CM

## 2024-11-11 DIAGNOSIS — E78.49 OTHER HYPERLIPIDEMIA: ICD-10-CM

## 2024-11-11 DIAGNOSIS — Z12.11 COLON CANCER SCREENING: ICD-10-CM

## 2024-11-11 DIAGNOSIS — Z23 INFLUENZA VACCINE NEEDED: ICD-10-CM

## 2024-11-11 DIAGNOSIS — R41.89 COGNITIVE IMPAIRMENT: ICD-10-CM

## 2024-11-11 DIAGNOSIS — G47.09 OTHER INSOMNIA: ICD-10-CM

## 2024-11-11 DIAGNOSIS — F33.1 MODERATE EPISODE OF RECURRENT MAJOR DEPRESSIVE DISORDER: Primary | ICD-10-CM

## 2024-11-11 DIAGNOSIS — I10 BENIGN ESSENTIAL HTN: ICD-10-CM

## 2024-11-11 PROCEDURE — 99214 OFFICE O/P EST MOD 30 MIN: CPT | Mod: 25,S$GLB,, | Performed by: INTERNAL MEDICINE

## 2024-11-11 PROCEDURE — 99999 PR PBB SHADOW E&M-EST. PATIENT-LVL IV: CPT | Mod: PBBFAC,,, | Performed by: INTERNAL MEDICINE

## 2024-11-11 PROCEDURE — 3008F BODY MASS INDEX DOCD: CPT | Mod: CPTII,S$GLB,, | Performed by: INTERNAL MEDICINE

## 2024-11-11 PROCEDURE — 3077F SYST BP >= 140 MM HG: CPT | Mod: CPTII,S$GLB,, | Performed by: INTERNAL MEDICINE

## 2024-11-11 PROCEDURE — 4010F ACE/ARB THERAPY RXD/TAKEN: CPT | Mod: CPTII,S$GLB,, | Performed by: INTERNAL MEDICINE

## 2024-11-11 PROCEDURE — 3044F HG A1C LEVEL LT 7.0%: CPT | Mod: CPTII,S$GLB,, | Performed by: INTERNAL MEDICINE

## 2024-11-11 PROCEDURE — 1159F MED LIST DOCD IN RCRD: CPT | Mod: CPTII,S$GLB,, | Performed by: INTERNAL MEDICINE

## 2024-11-11 PROCEDURE — 1160F RVW MEDS BY RX/DR IN RCRD: CPT | Mod: CPTII,S$GLB,, | Performed by: INTERNAL MEDICINE

## 2024-11-11 PROCEDURE — 3080F DIAST BP >= 90 MM HG: CPT | Mod: CPTII,S$GLB,, | Performed by: INTERNAL MEDICINE

## 2024-11-11 PROCEDURE — 90653 IIV ADJUVANT VACCINE IM: CPT | Mod: S$GLB,,, | Performed by: INTERNAL MEDICINE

## 2024-11-11 PROCEDURE — 3288F FALL RISK ASSESSMENT DOCD: CPT | Mod: CPTII,S$GLB,, | Performed by: INTERNAL MEDICINE

## 2024-11-11 PROCEDURE — 90471 IMMUNIZATION ADMIN: CPT | Mod: S$GLB,,, | Performed by: INTERNAL MEDICINE

## 2024-11-11 PROCEDURE — 1101F PT FALLS ASSESS-DOCD LE1/YR: CPT | Mod: CPTII,S$GLB,, | Performed by: INTERNAL MEDICINE

## 2024-11-11 PROCEDURE — 82043 UR ALBUMIN QUANTITATIVE: CPT | Performed by: INTERNAL MEDICINE

## 2024-11-11 RX ORDER — FUROSEMIDE 40 MG/1
40 TABLET ORAL 2 TIMES DAILY
Qty: 60 TABLET | Refills: 2 | Status: SHIPPED | OUTPATIENT
Start: 2024-11-11 | End: 2025-02-09

## 2024-11-11 RX ORDER — LOSARTAN POTASSIUM 100 MG/1
100 TABLET ORAL
COMMUNITY
Start: 2024-09-28 | End: 2024-11-11 | Stop reason: ALTCHOICE

## 2024-11-11 RX ORDER — ATORVASTATIN CALCIUM 40 MG/1
40 TABLET, FILM COATED ORAL DAILY
Qty: 90 TABLET | Refills: 3 | Status: SHIPPED | OUTPATIENT
Start: 2024-11-11 | End: 2025-11-11

## 2024-11-11 RX ORDER — ZOLPIDEM TARTRATE 10 MG/1
10 TABLET ORAL NIGHTLY PRN
Qty: 30 TABLET | Refills: 0 | Status: SHIPPED | OUTPATIENT
Start: 2024-11-11

## 2024-11-11 RX ORDER — SERTRALINE HYDROCHLORIDE 50 MG/1
50 TABLET, FILM COATED ORAL
COMMUNITY
Start: 2024-09-08

## 2024-11-11 NOTE — PROGRESS NOTES
Health Maintenance Due   Topic     DEXA Scan  Pending order    Colorectal Cancer Screening  Pending order    RSV Vaccine (Age 60+ and Pregnant patients) (1 - Risk 60-74 years 1-dose series) Not offered at this office    Mammogram  Pending order    Hemoglobin A1c  Consult pcp    Influenza Vaccine (1) Pt agree to get today    COVID-19 Vaccine (6 - 2024-25 season) Pt decline    Diabetes Urine Screening  Consult pcp    Foot Exam  Consult pcp

## 2024-11-12 LAB
ALBUMIN/CREAT UR: NORMAL UG/MG (ref 0–30)
CREAT UR-MCNC: 90 MG/DL (ref 15–325)
MICROALBUMIN UR DL<=1MG/L-MCNC: >5000 UG/ML

## 2024-11-12 NOTE — PROGRESS NOTES
Chief Complaint: Follow-up (Memory loss, lack of appetite,fatigue and diarrhea. )      Machelle Morin  is a 64 y.o. year old patient who presents today for     History of Present Illness    CHIEF COMPLAINT:  Machelle presents for a follow-up visit to review medications, discuss weight loss, and address memory concerns.    HPI:  Machelle reports severe, uncontrollable diarrhea attributed to Trulicity medication, especially when combined with coffee consumption. The diarrhea occurs shortly after taking Trulicity, particularly early in the dosing cycle. She typically takes Trulicity on Sundays but had to delay her most recent dose.    Machelle has lost 35 lbs recently, which appears unintentional. She eats only two meals a day sometimes, attributing this to depression and stress from recent life changes. These changes include her daughter-in-law and granddaughter moving out, financial stress from lending money, and grieving the loss of her brother-in-law. Machelle has recently moved in with her sisters due to these circumstances and her declining health.    Machelle mentions issues with her eyesight, stating she is having vision loss.    Regarding mental health, the patient reports ongoing depression and sees a therapist once a week. She expresses a desire to return to reading, which she used to enjoy as a former .    Machelle is having memory problems. Depression may be masking potential dementia symptoms.    Machelle denies any issues with mood when directly asked.    MEDICATIONS:  Machelle is on Zolpidem (Ambien) 10 mg nearly nightly for sleep, which is a controlled substance. She is also on Sertraline (Zoloft) and Escitalopram (Lexapro) for depression. She takes Furosemide twice daily for leg swelling. Machelle is on weekly Trulicity (usually on Sundays) for diabetes, which helps with A1C but has a side effect of severe diarrhea, especially when combined with coffee. She is also on Carvedilol (Coreg) for blood  pressure and heart, Atorvastatin for cholesterol, and Amlodipine in combination with olmesartan (Abbi) for blood pressure. Machelle is also taking a potassium supplement.    MEDICAL HISTORY:  Machelle has a history of depression, diabetes, hypertension, high cholesterol, and early stages of dementia.    TEST RESULTS:  Ramirezs A1C was high 1 year ago but improved with cholesterol medication. Her cholesterol was also high 1 year ago but improved with medication. Potassium levels were mentioned.    IMAGING:  Machelle underwent a mammogram 1 year ago, and the results were normal.    SOCIAL HISTORY:  Machelle regularly consumes coffee. She formerly worked as a .      ROS:  General: -fever, -chills, -fatigue, -weight gain, +weight loss  Eyes: +vision changes, -redness, -discharge  ENT: -ear pain, -nasal congestion, -sore throat  Cardiovascular: -chest pain, -palpitations, -lower extremity edema  Respiratory: -cough, -shortness of breath  Gastrointestinal: -abdominal pain, -nausea, -vomiting, +diarrhea, -constipation, -blood in stool  Genitourinary: -dysuria, -hematuria, -frequency  Musculoskeletal: -joint pain, -muscle pain  Skin: -rash, -lesion  Neurological: -headache, -dizziness, -numbness, -tingling  Psychiatric: -anxiety, +depression, -sleep difficulty         Past Medical History:   Diagnosis Date    Acute diastolic congestive heart failure 2024    Diabetes mellitus     Hypertension     Hypertensive emergency 2024       Past Surgical History:   Procedure Laterality Date     SECTION      ENDOMETRIAL ABLATION      OVARIAN CYST REMOVAL          Family History   Problem Relation Name Age of Onset    Alcohol abuse Father      Miscarriages / Stillbirths Mother      Hypertension Mother      Cancer Mother      Arthritis Mother      Pancreatic cancer Mother  83    Early death Brother      Early death Brother      Miscarriages / Stillbirths Sister      Asthma Sister      Asthma Daughter      Colon cancer  Neg Hx      Diabetes Neg Hx      Stroke Neg Hx          Social History     Socioeconomic History    Marital status:     Number of children: 1   Tobacco Use    Smoking status: Never     Passive exposure: Never    Smokeless tobacco: Never   Substance and Sexual Activity    Alcohol use: Yes     Alcohol/week: 1.0 standard drink of alcohol     Types: 1 Glasses of wine per week     Comment: socially    Drug use: Never    Sexual activity: Not Currently     Partners: Male     Comment:  since 2020 after 38 years marriage     Social Drivers of Health     Financial Resource Strain: Low Risk  (11/11/2023)    Overall Financial Resource Strain (CARDIA)     Difficulty of Paying Living Expenses: Not very hard   Food Insecurity: No Food Insecurity (11/11/2023)    Hunger Vital Sign     Worried About Running Out of Food in the Last Year: Never true     Ran Out of Food in the Last Year: Never true   Transportation Needs: No Transportation Needs (11/11/2023)    PRAPARE - Transportation     Lack of Transportation (Medical): No     Lack of Transportation (Non-Medical): No   Physical Activity: Inactive (11/11/2023)    Exercise Vital Sign     Days of Exercise per Week: 0 days     Minutes of Exercise per Session: 0 min   Stress: Stress Concern Present (11/11/2023)    [employer] - Occupational Stress Questionnaire     Feeling of Stress : Very much   Housing Stability: Unknown (11/11/2023)    Housing Stability Vital Sign     Unable to Pay for Housing in the Last Year: Patient refused     Number of Places Lived in the Last Year: 1     Unstable Housing in the Last Year: No         Current Outpatient Medications:     amlodipine-olmesartan (AMBER) 10-40 mg per tablet, Take 1 tablet by mouth once daily., Disp: 90 tablet, Rfl: 0    carvediloL (COREG) 25 MG tablet, Take 1 tablet (25 mg total) by mouth 2 (two) times daily with meals., Disp: 180 tablet, Rfl: 3    dulaglutide (TRULICITY) 0.75 mg/0.5 mL pen  injector, Inject 0.75 mg into the skin every 7 days., Disp: 4 pen , Rfl: 2    sertraline (ZOLOFT) 50 MG tablet, Take 50 mg by mouth., Disp: , Rfl:     atorvastatin (LIPITOR) 40 MG tablet, Take 1 tablet (40 mg total) by mouth once daily., Disp: 90 tablet, Rfl: 3    furosemide (LASIX) 40 MG tablet, Take 1 tablet (40 mg total) by mouth 2 (two) times daily., Disp: 60 tablet, Rfl: 2    zolpidem (AMBIEN) 10 mg Tab, Take 1 tablet (10 mg total) by mouth nightly as needed (insomnia). Ambien 10 mg tablet  Take 1 tablet every day by oral route., Disp: 30 tablet, Rfl: 0  No current facility-administered medications for this visit.           Objective:      Vitals:    11/11/24 1600   BP: (!) 190/108   Pulse:    Temp:        Physical Exam  Constitutional:       Appearance: Normal appearance.   HENT:      Head: Normocephalic and atraumatic.   Skin:     General: Skin is warm and dry.   Neurological:      General: No focal deficit present.      Mental Status: She is alert and oriented to person, place, and time.   Psychiatric:         Mood and Affect: Mood normal.         Behavior: Behavior normal.          Assessment:       1. Moderate episode of recurrent major depressive disorder    2. Type 2 diabetes mellitus with hyperglycemia, without long-term current use of insulin    3. Influenza vaccine needed    4. Other insomnia    5. Leg swelling    6. Colon cancer screening    7. Cognitive impairment    8. Benign essential HTN    9. Other hyperlipidemia          Plan:   1. Moderate episode of recurrent major depressive disorder  Assessment & Plan:  Chronic, unresolved. Patient has drop in appetite   Still dealing with family issues.   Patient was taking zoloft and lexapro     - will gradually d/c lexapro and cont zoloft   - f/up in 4 weeks, may need to increase dose if patients MDD still unresolved       2. Type 2 diabetes mellitus with hyperglycemia, without long-term current use of insulin  Assessment & Plan:  Controlled   Lab  Results   Component Value Date    HGBA1C 6.5 (H) 02/07/2024     - Recommend continuation of Trulicity despite side effects due to significant improvement in A1C.  Continued Trulicity for diabetes management.    Orders:  -     Microalbumin/Creatinine Ratio, Urine  -     atorvastatin (LIPITOR) 40 MG tablet; Take 1 tablet (40 mg total) by mouth once daily.  Dispense: 90 tablet; Refill: 3  -     Lipid Panel; Future; Expected date: 11/11/2024    3. Influenza vaccine needed  -     influenza (adjuvanted) (Fluad) 45 mcg/0.5 mL IM vaccine (> or = 64 yo) 0.5 mL    4. Other insomnia  Assessment & Plan:  Chronic, stable     - continue ambien (30 day supply)    Orders:  -     zolpidem (AMBIEN) 10 mg Tab; Take 1 tablet (10 mg total) by mouth nightly as needed (insomnia). Ambien 10 mg tablet  Take 1 tablet every day by oral route.  Dispense: 30 tablet; Refill: 0    5. Leg swelling  Assessment & Plan:  Chronic, improving     - continue lasix BID  - monitor potassium levels      Orders:  -     furosemide (LASIX) 40 MG tablet; Take 1 tablet (40 mg total) by mouth 2 (two) times daily.  Dispense: 60 tablet; Refill: 2    6. Colon cancer screening  -     Cologuard Screening (Multitarget Stool DNA); Future; Expected date: 11/11/2024    7. Cognitive impairment  Assessment & Plan:  No formal assessment  Considered memory concerns, offering options of donepezil or memantine for potential cognitive decline.  Provided information on donepezil and memantine for memory concerns.  Discussed the potential benefits of cognitive activities like reading and puzzles for brain health.    - patient will decide on medication or referral during 4 week follow up      8. Benign essential HTN  Assessment & Plan:  Chronic, uncontrolled. BP: (!) 190/108 (11/11/2024  4:00 PM)  Patient mistakenly taking zoloft for HTN     - Started combination of amlodipine and olmesartan (Abbi) for blood pressure management.  - Continued carvedilol (Coreg) for blood pressure  and heart.  - Follow up in 2 weeks for blood pressure check.  - advised to measure BP at least three times a week   - three month follow up         9. Other hyperlipidemia  Assessment & Plan:  - Restarted atorvastatin for cholesterol management.      NUTRITION:  Emphasized the importance of maintaining a balanced diet with adequate nutrients despite reduced appetite.  Machelle to aim for at least 2 well-balanced meals per day, including vegetables, fruits, and protein.    LABS:  Ordered labs to check potassium levels and other relevant markers.  Ordered urine test.    COLON CANCER SCREENING:  Ordered Cologuard test for colon cancer screening.    MAMMOGRAM:  Ordered mammogram.    FLU VACCINATION:  Administered flu vaccine in office.    FOLLOW UP:  Determined need for close follow-up due to multiple health concerns and recent medication changes.  Contact the office via patient portal for any questions or concerns before next appointment.         Follow up in about 4 weeks (around 12/9/2024) for f/up.    This note was generated with the assistance of ambient listening technology. Verbal consent was obtained by the patient and accompanying visitor(s) for the recording of patient appointment to facilitate this note. I attest to having reviewed and edited the generated note for accuracy, though some syntax or spelling errors may persist. Please contact the author of this note for any clarification.

## 2024-11-12 NOTE — ASSESSMENT & PLAN NOTE
Chronic, unresolved. Patient has drop in appetite   Still dealing with family issues.   Patient was taking zoloft and lexapro     - will gradually d/c lexapro and cont zoloft   - f/up in 4 weeks, may need to increase dose if patients MDD still unresolved

## 2024-11-12 NOTE — ASSESSMENT & PLAN NOTE
Controlled   Lab Results   Component Value Date    HGBA1C 6.5 (H) 02/07/2024     - Recommend continuation of Trulicity despite side effects due to significant improvement in A1C.  Continued Trulicity for diabetes management.

## 2024-11-12 NOTE — ASSESSMENT & PLAN NOTE
No formal assessment  Considered memory concerns, offering options of donepezil or memantine for potential cognitive decline.  Provided information on donepezil and memantine for memory concerns.  Discussed the potential benefits of cognitive activities like reading and puzzles for brain health.    - patient will decide on medication or referral during 4 week follow up

## 2024-11-12 NOTE — ASSESSMENT & PLAN NOTE
Chronic, uncontrolled. BP: (!) 190/108 (11/11/2024  4:00 PM)  Patient mistakenly taking zoloft for HTN     - Started combination of amlodipine and olmesartan (Abbi) for blood pressure management.  - Continued carvedilol (Coreg) for blood pressure and heart.  - Follow up in 2 weeks for blood pressure check.  - advised to measure BP at least three times a week   - three month follow up

## 2024-11-15 ENCOUNTER — LAB VISIT (OUTPATIENT)
Dept: LAB | Facility: HOSPITAL | Age: 64
End: 2024-11-15
Attending: INTERNAL MEDICINE
Payer: COMMERCIAL

## 2024-11-15 DIAGNOSIS — E11.65 TYPE 2 DIABETES MELLITUS WITH HYPERGLYCEMIA, WITHOUT LONG-TERM CURRENT USE OF INSULIN: ICD-10-CM

## 2024-11-15 DIAGNOSIS — M79.89 LEG SWELLING: ICD-10-CM

## 2024-11-15 LAB
ANION GAP SERPL CALC-SCNC: 7 MMOL/L (ref 8–16)
BUN SERPL-MCNC: 53 MG/DL (ref 8–23)
CALCIUM SERPL-MCNC: 8.1 MG/DL (ref 8.7–10.5)
CHLORIDE SERPL-SCNC: 107 MMOL/L (ref 95–110)
CHOLEST SERPL-MCNC: 238 MG/DL (ref 120–199)
CHOLEST/HDLC SERPL: 3.7 {RATIO} (ref 2–5)
CO2 SERPL-SCNC: 24 MMOL/L (ref 23–29)
CREAT SERPL-MCNC: 1.9 MG/DL (ref 0.5–1.4)
EST. GFR  (NO RACE VARIABLE): 28.9 ML/MIN/1.73 M^2
ESTIMATED AVG GLUCOSE: 114 MG/DL (ref 68–131)
GLUCOSE SERPL-MCNC: 114 MG/DL (ref 70–110)
HBA1C MFR BLD: 5.6 % (ref 4–5.6)
HDLC SERPL-MCNC: 65 MG/DL (ref 40–75)
HDLC SERPL: 27.3 % (ref 20–50)
LDLC SERPL CALC-MCNC: 155.8 MG/DL (ref 63–159)
NONHDLC SERPL-MCNC: 173 MG/DL
POTASSIUM SERPL-SCNC: 4 MMOL/L (ref 3.5–5.1)
SODIUM SERPL-SCNC: 138 MMOL/L (ref 136–145)
TRIGL SERPL-MCNC: 86 MG/DL (ref 30–150)

## 2024-11-15 PROCEDURE — 80061 LIPID PANEL: CPT | Performed by: INTERNAL MEDICINE

## 2024-11-15 PROCEDURE — 83036 HEMOGLOBIN GLYCOSYLATED A1C: CPT | Performed by: INTERNAL MEDICINE

## 2024-11-15 PROCEDURE — 80048 BASIC METABOLIC PNL TOTAL CA: CPT | Performed by: INTERNAL MEDICINE

## 2024-12-02 ENCOUNTER — CLINICAL SUPPORT (OUTPATIENT)
Dept: FAMILY MEDICINE | Facility: CLINIC | Age: 64
End: 2024-12-02
Payer: COMMERCIAL

## 2024-12-02 VITALS — DIASTOLIC BLOOD PRESSURE: 80 MMHG | SYSTOLIC BLOOD PRESSURE: 134 MMHG

## 2024-12-02 DIAGNOSIS — I10 BENIGN ESSENTIAL HTN: Primary | ICD-10-CM

## 2024-12-02 PROCEDURE — 99999 PR PBB SHADOW E&M-EST. PATIENT-LVL II: CPT | Mod: PBBFAC,,,

## 2024-12-02 NOTE — PROGRESS NOTES
Machelle Morin 64 y.o. female is here today for Blood Pressure check.   History of HTN yes.    Review of patient's allergies indicates:   Allergen Reactions    Sulfa (sulfonamide antibiotics) Itching    Cephalexin Nausea And Vomiting     Creatinine   Date Value Ref Range Status   11/15/2024 1.9 (H) 0.5 - 1.4 mg/dL Final     Sodium   Date Value Ref Range Status   11/15/2024 138 136 - 145 mmol/L Final     Potassium   Date Value Ref Range Status   11/15/2024 4.0 3.5 - 5.1 mmol/L Final   ]  Patient verifies taking blood pressure medications on a regular basis at the same time of the day.     Current Outpatient Medications:     amlodipine-olmesartan (AMBER) 10-40 mg per tablet, Take 1 tablet by mouth once daily., Disp: 90 tablet, Rfl: 0    atorvastatin (LIPITOR) 40 MG tablet, Take 1 tablet (40 mg total) by mouth once daily., Disp: 90 tablet, Rfl: 3    carvediloL (COREG) 25 MG tablet, Take 1 tablet (25 mg total) by mouth 2 (two) times daily with meals., Disp: 180 tablet, Rfl: 3    dulaglutide (TRULICITY) 0.75 mg/0.5 mL pen injector, Inject 0.75 mg into the skin every 7 days., Disp: 4 pen , Rfl: 2    furosemide (LASIX) 40 MG tablet, Take 1 tablet (40 mg total) by mouth 2 (two) times daily., Disp: 60 tablet, Rfl: 2    sertraline (ZOLOFT) 50 MG tablet, Take 50 mg by mouth., Disp: , Rfl:     zolpidem (AMBIEN) 10 mg Tab, Take 1 tablet (10 mg total) by mouth nightly as needed (insomnia). Ambien 10 mg tablet  Take 1 tablet every day by oral route., Disp: 30 tablet, Rfl: 0  Does patient have record of home blood pressure readings no. Readings have been averaging not done.   Last dose of blood pressure medication was taken at this morning.  Patient is asymptomatic.   Complains of no complaints.    Vitals:    12/02/24 0945 12/02/24 1005   BP: (!) 150/82 134/80   BP Location: Right arm Right arm   Patient Position: Sitting Sitting         Dr. Ocasio informed of nurse visit.   Pt has F/U visit with Elvira next week

## 2024-12-03 DIAGNOSIS — E11.65 TYPE 2 DIABETES MELLITUS WITH HYPERGLYCEMIA, WITHOUT LONG-TERM CURRENT USE OF INSULIN: ICD-10-CM

## 2024-12-03 DIAGNOSIS — E66.9 NON MORBID OBESITY, UNSPECIFIED OBESITY TYPE: ICD-10-CM

## 2024-12-03 RX ORDER — DULAGLUTIDE 0.75 MG/.5ML
0.75 INJECTION, SOLUTION SUBCUTANEOUS
Qty: 4 PEN | Refills: 2 | Status: SHIPPED | OUTPATIENT
Start: 2024-12-03 | End: 2025-03-03

## 2024-12-03 NOTE — TELEPHONE ENCOUNTER
----- Message from Apartment Adda sent at 12/3/2024 12:51 PM CST -----  Regarding: Machelle  Type: RX Refill Request     Who Called:Machelle      Have you contacted your pharmacy: Yes     Refill or New Rx: Refill      RX Name and Strength: dulaglutide (TRULICITY) 0.75 mg/0.5 mL pen injector//Patient stated that she is out of medication and needs refills sent to the pharmacy. Please reach out to the pharmacy and call the patient once done.     Preferred Pharmacy with phone number:.  [id number] - [place] - [address] AT [address]  [address] [place] [address]  Phone: [phone] Fax: [phone]    Local or Mail Order: Local     Ordering Provider: Dr. Tricia Ocasio     Would the patient rather a call back or a response via [facility]? Callback      Best Call Back Number: [phone]    Additional Information:

## 2024-12-03 NOTE — TELEPHONE ENCOUNTER
No care due was identified.  [facility] Embedded Care Due Messages. Reference number: [id number].   12/03/2024 12:55:58 PM CST

## 2024-12-03 NOTE — TELEPHONE ENCOUNTER
Last Office Visit Info:   The patient's last visit with Tricia Ocasio MD was on 11/11/2024.    The patient's last visit in current department was on 12/2/2024.        Last CBC Results:   Lab Results   Component Value Date    WBC 5.39 04/18/2024    HGB 8.8 (L) 04/18/2024    HCT 27.0 (L) 04/18/2024     (L) 04/18/2024       Last CMP Results  Lab Results   Component Value Date     11/15/2024    K 4.0 11/15/2024     11/15/2024    CO2 24 11/15/2024    BUN 53 (H) 11/15/2024    CREATININE 1.9 (H) 11/15/2024    CALCIUM 8.1 (L) 11/15/2024    ALBUMIN 2.2 (L) 04/18/2024    AST 43 (H) 04/18/2024    ALT 24 04/18/2024       Last Lipids  Lab Results   Component Value Date    CHOL 238 (H) 11/15/2024    TRIG 86 11/15/2024    HDL 65 11/15/2024    LDLCALC 155.8 11/15/2024       Last A1C  Lab Results   Component Value Date    HGBA1C 5.6 11/15/2024       Last TSH  Lab Results   Component Value Date    TSH 1.190 05/11/2023             Current Med Refills  Medication List with Changes/Refills   Current Medications    AMLODIPINE-OLMESARTAN (AMBER) 10-40 MG PER TABLET    Take 1 tablet by mouth once daily.       Start Date: 9/10/2024 End Date: 12/9/2024    ATORVASTATIN (LIPITOR) 40 MG TABLET    Take 1 tablet (40 mg total) by mouth once daily.       Start Date: 11/11/2024End Date: 11/11/2025    CARVEDILOL (COREG) 25 MG TABLET    Take 1 tablet (25 mg total) by mouth 2 (two) times daily with meals.       Start Date: 4/18/2024 End Date: 4/18/2025    DULAGLUTIDE (TRULICITY) 0.75 MG/0.5 ML PEN INJECTOR    Inject 0.75 mg into the skin every 7 days.       Start Date: 10/14/2024End Date: 1/12/2025    FUROSEMIDE (LASIX) 40 MG TABLET    Take 1 tablet (40 mg total) by mouth 2 (two) times daily.       Start Date: 11/11/2024End Date: 2/9/2025    SERTRALINE (ZOLOFT) 50 MG TABLET    Take 50 mg by mouth.       Start Date: 9/8/2024  End Date: --    ZOLPIDEM (AMBIEN) 10 MG TAB    Take 1 tablet (10 mg total) by mouth nightly as needed  (insomnia). Ambien 10 mg tablet  Take 1 tablet every day by oral route.       Start Date: 11/11/2024End Date: --       Order(s) placed per written order guidelines: none    Please advise.

## 2024-12-09 ENCOUNTER — OFFICE VISIT (OUTPATIENT)
Dept: FAMILY MEDICINE | Facility: CLINIC | Age: 64
End: 2024-12-09
Payer: COMMERCIAL

## 2024-12-09 VITALS
DIASTOLIC BLOOD PRESSURE: 82 MMHG | HEIGHT: 65 IN | BODY MASS INDEX: 30.16 KG/M2 | TEMPERATURE: 98 F | OXYGEN SATURATION: 95 % | WEIGHT: 181 LBS | SYSTOLIC BLOOD PRESSURE: 148 MMHG | HEART RATE: 72 BPM | RESPIRATION RATE: 16 BRPM

## 2024-12-09 DIAGNOSIS — F33.1 MODERATE EPISODE OF RECURRENT MAJOR DEPRESSIVE DISORDER: ICD-10-CM

## 2024-12-09 DIAGNOSIS — M79.89 LEG SWELLING: ICD-10-CM

## 2024-12-09 DIAGNOSIS — I10 BENIGN ESSENTIAL HTN: ICD-10-CM

## 2024-12-09 DIAGNOSIS — R41.89 COGNITIVE IMPAIRMENT: ICD-10-CM

## 2024-12-09 DIAGNOSIS — E11.3413 TYPE 2 DIABETES MELLITUS WITH BOTH EYES AFFECTED BY SEVERE NONPROLIFERATIVE RETINOPATHY AND MACULAR EDEMA, WITHOUT LONG-TERM CURRENT USE OF INSULIN: ICD-10-CM

## 2024-12-09 DIAGNOSIS — Z09 FOLLOW-UP EXAM: Primary | ICD-10-CM

## 2024-12-09 DIAGNOSIS — G47.09 OTHER INSOMNIA: ICD-10-CM

## 2024-12-09 PROCEDURE — 3288F FALL RISK ASSESSMENT DOCD: CPT | Mod: CPTII,S$GLB,,

## 2024-12-09 PROCEDURE — 1101F PT FALLS ASSESS-DOCD LE1/YR: CPT | Mod: CPTII,S$GLB,,

## 2024-12-09 PROCEDURE — 3044F HG A1C LEVEL LT 7.0%: CPT | Mod: CPTII,S$GLB,,

## 2024-12-09 PROCEDURE — 3079F DIAST BP 80-89 MM HG: CPT | Mod: CPTII,S$GLB,,

## 2024-12-09 PROCEDURE — 3061F NEG MICROALBUMINURIA REV: CPT | Mod: CPTII,S$GLB,,

## 2024-12-09 PROCEDURE — 3077F SYST BP >= 140 MM HG: CPT | Mod: CPTII,S$GLB,,

## 2024-12-09 PROCEDURE — 99214 OFFICE O/P EST MOD 30 MIN: CPT | Mod: S$GLB,,,

## 2024-12-09 PROCEDURE — 4010F ACE/ARB THERAPY RXD/TAKEN: CPT | Mod: CPTII,S$GLB,,

## 2024-12-09 PROCEDURE — 3008F BODY MASS INDEX DOCD: CPT | Mod: CPTII,S$GLB,,

## 2024-12-09 PROCEDURE — 99999 PR PBB SHADOW E&M-EST. PATIENT-LVL IV: CPT | Mod: PBBFAC,,,

## 2024-12-09 PROCEDURE — 1159F MED LIST DOCD IN RCRD: CPT | Mod: CPTII,S$GLB,,

## 2024-12-09 PROCEDURE — 3066F NEPHROPATHY DOC TX: CPT | Mod: CPTII,S$GLB,,

## 2024-12-09 PROCEDURE — 1160F RVW MEDS BY RX/DR IN RCRD: CPT | Mod: CPTII,S$GLB,,

## 2024-12-09 RX ORDER — AMLODIPINE AND OLMESARTAN MEDOXOMIL 10; 40 MG/1; MG/1
1 TABLET ORAL DAILY
Qty: 90 TABLET | Refills: 1 | Status: SHIPPED | OUTPATIENT
Start: 2024-12-09 | End: 2025-03-09

## 2024-12-09 NOTE — PROGRESS NOTES
HPI     Chief Complaint:  Chief Complaint   Patient presents with    Follow-up       Machelle Morin is a 64 y.o. female with multiple medical diagnoses as listed in the medical history and problem list that presents for  follow up    HPI    History of Present Illness    CHIEF COMPLAINT:  Machelle presents today for follow-up on diabetes management and other health concerns.    DIABETES MANAGEMENT:  She is currently on Trulicity for diabetes management and reports experiencing frequent urination. She notes that coffee consumption triggers bowel movements.    CARDIOVASCULAR HEALTH:  She reports improvement in leg swelling with the use of Lasix. She has been using compression stockings but needs to purchase more, acknowledging the need for over-the-knee compression stockings for better effectiveness. Her blood pressure has improved since the last visit. She is currently taking Abbi (combination of amlodipine and olmesartan) and Coreg for blood pressure management.    SLEEP:  She reports improved sleep with Zolpidem (Ambien), which is working well and providing good night's sleep.    COGNITIVE HEALTH:  She engages in cognitive activities, including doing puzzles sometimes and reading frequently, acknowledging these activities are beneficial for brain health. Her sister has expressed concerns about her memory, but she does not perceive significant memory issues herself.    MENTAL HEALTH:  She is currently seeing a therapist and is considering changing therapists due to insurance coverage issues.    LIFESTYLE:  She reports eating fruits and vegetables daily and is considering starting yoga as a form of exercise. She drinks 4 bottles of water (16 ounces each) daily.                      11/11/2024 Dr. Ocasio    Plan:   1. Moderate episode of recurrent major depressive disorder  Assessment & Plan:  Chronic, unresolved. Patient has drop in appetite   Still dealing with family issues.   Patient was taking zoloft and  lexapro      - will gradually d/c lexapro and cont zoloft   - f/up in 4 weeks, may need to increase dose if patients MDD still unresolved         2. Type 2 diabetes mellitus with hyperglycemia, without long-term current use of insulin  Assessment & Plan:  Controlled         Lab Results   Component Value Date     HGBA1C 6.5 (H) 02/07/2024      - Recommend continuation of Trulicity despite side effects due to significant improvement in A1C.  Continued Trulicity for diabetes management.     Orders:  -     Microalbumin/Creatinine Ratio, Urine  -     atorvastatin (LIPITOR) 40 MG tablet; Take 1 tablet (40 mg total) by mouth once daily.  Dispense: 90 tablet; Refill: 3  -     Lipid Panel; Future; Expected date: 11/11/2024     3. Influenza vaccine needed  -     influenza (adjuvanted) (Fluad) 45 mcg/0.5 mL IM vaccine (> or = 64 yo) 0.5 mL     4. Other insomnia  Assessment & Plan:  Chronic, stable      - continue ambien (30 day supply)     Orders:  -     zolpidem (AMBIEN) 10 mg Tab; Take 1 tablet (10 mg total) by mouth nightly as needed (insomnia). Ambien 10 mg tablet  Take 1 tablet every day by oral route.  Dispense: 30 tablet; Refill: 0     5. Leg swelling  Assessment & Plan:  Chronic, improving      - continue lasix BID  - monitor potassium levels        Orders:  -     furosemide (LASIX) 40 MG tablet; Take 1 tablet (40 mg total) by mouth 2 (two) times daily.  Dispense: 60 tablet; Refill: 2     6. Colon cancer screening  -     Cologuard Screening (Multitarget Stool DNA); Future; Expected date: 11/11/2024     7. Cognitive impairment  Assessment & Plan:  No formal assessment  Considered memory concerns, offering options of donepezil or memantine for potential cognitive decline.  Provided information on donepezil and memantine for memory concerns.  Discussed the potential benefits of cognitive activities like reading and puzzles for brain health.     - patient will decide on medication or referral during 4 week follow up         8. Benign essential HTN  Assessment & Plan:  Chronic, uncontrolled. BP: (!) 190/108 (11/11/2024  4:00 PM)  Patient mistakenly taking zoloft for HTN      - Started combination of amlodipine and olmesartan (Abbi) for blood pressure management.  - Continued carvedilol (Coreg) for blood pressure and heart.  - Follow up in 2 weeks for blood pressure check.  - advised to measure BP at least three times a week   - three month follow up            9. Other hyperlipidemia  Assessment & Plan:  - Restarted atorvastatin for cholesterol management.        NUTRITION:  Emphasized the importance of maintaining a balanced diet with adequate nutrients despite reduced appetite.  Machelle to aim for at least 2 well-balanced meals per day, including vegetables, fruits, and protein.     LABS:  Ordered labs to check potassium levels and other relevant markers.  Ordered urine test.     COLON CANCER SCREENING:  Ordered Cologuard test for colon cancer screening.     MAMMOGRAM:  Ordered mammogram.     FLU VACCINATION:  Administered flu vaccine in office.     FOLLOW UP:  Determined need for close follow-up due to multiple health concerns and recent medication changes.  Contact the office via patient portal for any questions or concerns before next appointment.          Follow up in about 4 weeks (around 12/9/2024) for f/up.    Assessment & Plan     Assessment & Plan    Assessed patient's response to Trulicity for diabetes management  Evaluated effectiveness of Ambien (zolpidem) for sleep improvement  Monitored leg swelling response to Lasix treatment  Reviewed blood pressure control; noted improvement from 190/180 to 148/82  Considered cognitive function; patient reports no significant memory concerns at this time.  States that her sister has this concern for her.  Patient is by herself at this appointment today.  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  - Offered referral to podiatrist for diabetic foot exam and potential diabetic shoe fitting.   Patient will consider this for future    DIETARY COUNSELING:  - Explained that coffee consumption can cause increased bowel movements.  - Machelle to maintain current water intake (4-5 16oz bottles daily).  - Machelle to continue eating fruits and vegetables daily.    EXERCISE RECOMMENDATIONS:  - Recommend increasing exercise, consider yoga.      Benign essential HTN    Relevant Medications    amlodipine-olmesartan (AMBER) 10-40 mg per tablet  HYPERTENSION:  - Highlighted importance of diet and exercise in managing blood pressure and cholesterol.  - Refilled Amber (amlodipine/olmesartan combination) for blood pressure control.  - Continued Coreg (carvedilol) for blood pressure management.  - Continue to monitor blood pressure throughout the week at home  - Discussed importance blood pressure control for kidney preservation;  notes kidney function declined on previous labs  HYPERLIPIDEMIA:  - Continued statin for cholesterol management.      Other insomnia  INSOMNIA:  -  Patient feels that this has been helpful for her sleep.  Sleeping more hours during the night  - Continued Ambien (zolpidem) for sleep.      Leg swelling  EDEMA:  - Discussed importance of over-the-knee compression stockings for managing leg swelling.  - Machelle to elevate legs to manage swelling.  - Machelle to use over-the-knee compression stockings.  - Continued Lasix for leg swelling.      Moderate episode of recurrent major depressive disorder  DEPRESSION:  - Offered referral to therapist within insurance network if patient decides to  change from current out-of-network therapist.  - Contact office to request referral for in-network therapist when ready.      Cognitive impairment    COGNITIVE HEALTH:  - Emphasized benefits of cognitive activities like reading and puzzles for brain health.  - Machelle to continue cognitive activities (reading, puzzles).      Follow-up exam - Primary    PREVENTIVE CARE:  - Mammogram ordered.  - Colonoscopy ordered.  - Bone density scan ordered.    FOLLOW-UP:  - Follow up in February 2025 with Dr. Ocasio or Mrs. Romero for blood pressure check and general health assessment.          --------------------------------------------      Health Maintenance:  Health Maintenance         Date Due Completion Date    DEXA Scan Never done ---    Colorectal Cancer Screening Never done ---    RSV Vaccine (Age 60+ and Pregnant patients) (1 - Risk 60-74 years 1-dose series) Never done ---    Mammogram 07/11/2024 7/11/2023    COVID-19 Vaccine (6 - 2024-25 season) 09/01/2024 12/1/2021    Foot Exam 11/14/2024 11/14/2023    Hemoglobin A1c 05/15/2025 11/15/2024    Eye Exam 05/17/2025 5/17/2024    Diabetes Urine Screening 11/11/2025 11/11/2024    Lipid Panel 11/15/2025 11/15/2024    Low Dose Statin 12/09/2025 12/9/2024    TETANUS VACCINE 05/11/2033 5/11/2023            Health maintenance reviewed and Advised patient on the importance of completing overdue health maintenance items    Follow Up:  Follow up if symptoms worsen or fail to improve.    Exam     Review of Systems:  (as noted above)  Review of Systems  ROS:  General: -fever, -chills, -fatigue, -weight gain, -weight loss  Eyes: -vision changes, -redness, -discharge  ENT: -ear pain, -nasal congestion, -sore throat  Cardiovascular: -chest pain, -palpitations, -lower extremity edema  Respiratory: -cough, -shortness of breath  Gastrointestinal: -abdominal pain, -nausea, -vomiting, -diarrhea, -constipation, -blood in stool, +change in bowel habits  Genitourinary: -dysuria, -hematuria,  "+frequency  Musculoskeletal: -joint pain, -muscle pain  Skin: -rash, -lesion  Neurological: -headache, -dizziness, -numbness, -tingling  Psychiatric: -anxiety, -depression, -sleep difficulty, -memory problems     Physical Exam:   Physical Exam  Vitals:    24 1447   BP: (!) 148/82   BP Location: Left arm   Patient Position: Sitting   Pulse: 72   Resp: 16   Temp: 98.2 °F (36.8 °C)   TempSrc: Oral   SpO2: 95%   Weight: 82.1 kg (181 lb)   Height: 5' 5" (1.651 m)      Body mass index is 30.12 kg/m².    Physical Exam    Vitals: Blood pressure: 148/82.  General: No acute distress. Well-developed. Well-nourished.  Eyes: EOMI. Sclerae anicteric.  HENT: Normocephalic. Atraumatic. Nares patent.   Cardiovascular: Regular rate. Regular rhythm. No murmurs. No rubs. No gallops. Normal S1, S2.  Respiratory: Normal respiratory effort. Clear to auscultation bilaterally. No rales. No rhonchi. No wheezing.  Musculoskeletal: No  obvious deformity.  Extremities: No lower extremity edema.  Neurological: Alert & oriented x3. No slurred speech. Normal gait.  Psychiatric: Normal mood. Normal affect. Good insight. Good judgment.  Skin: Warm. Dry. No rash.           History     Past Medical History:  Past Medical History:   Diagnosis Date    Acute diastolic congestive heart failure 2024    Diabetes mellitus     Hypertension     Hypertensive emergency 2024       Past Surgical History:  Past Surgical History:   Procedure Laterality Date     SECTION      ENDOMETRIAL ABLATION      OVARIAN CYST REMOVAL         Social History:  Social History     Socioeconomic History    Marital status:     Number of children: 1   Tobacco Use    Smoking status: Never     Passive exposure: Never    Smokeless tobacco: Never   Substance and Sexual Activity    Alcohol use: Yes     Alcohol/week: 1.0 standard drink of alcohol     Types: 1 Glasses of wine per week     Comment: socially    Drug use: Never    Sexual activity: Not Currently    "  Partners: Male     Comment:  since 2020 after 38 years marriage     Social Drivers of Health     Financial Resource Strain: Low Risk  (11/11/2023)    Overall Financial Resource Strain (CARDIA)     Difficulty of Paying Living Expenses: Not very hard   Food Insecurity: No Food Insecurity (11/11/2023)    Hunger Vital Sign     Worried About Running Out of Food in the Last Year: Never true     Ran Out of Food in the Last Year: Never true   Transportation Needs: No Transportation Needs (11/11/2023)    PRAPARE - Transportation     Lack of Transportation (Medical): No     Lack of Transportation (Non-Medical): No   Physical Activity: Inactive (11/11/2023)    Exercise Vital Sign     Days of Exercise per Week: 0 days     Minutes of Exercise per Session: 0 min   Stress: Stress Concern Present (11/11/2023)    [employer] - Occupational Stress Questionnaire     Feeling of Stress : Very much   Housing Stability: Unknown (11/11/2023)    Housing Stability Vital Sign     Unable to Pay for Housing in the Last Year: Patient refused     Number of Places Lived in the Last Year: 1     Unstable Housing in the Last Year: No       Family History:  Family History   Problem Relation Name Age of Onset    Alcohol abuse Father      Miscarriages / Stillbirths Mother      Hypertension Mother      Cancer Mother      Arthritis Mother      Pancreatic cancer Mother  83    Early death Brother      Early death Brother      Miscarriages / Stillbirths Sister      Asthma Sister      Asthma Daughter      Colon cancer Neg Hx      Diabetes Neg Hx      Stroke Neg Hx         Allergies and Medications: (updated and reviewed)  Review of patient's allergies indicates:   Allergen Reactions    Sulfa (sulfonamide antibiotics) Itching    Cephalexin Nausea And Vomiting     Current Outpatient Medications   Medication Sig Dispense Refill    atorvastatin (LIPITOR) 40 MG tablet Take 1 tablet (40 mg total) by mouth once daily. 90 tablet 3     carvediloL (COREG) 25 MG tablet Take 1 tablet (25 mg total) by mouth 2 (two) times daily with meals. 180 tablet 3    dulaglutide (TRULICITY) 0.75 mg/0.5 mL pen injector Inject 0.75 mg into the skin every 7 days. 4 pen 2    furosemide (LASIX) 40 MG tablet Take 1 tablet (40 mg total) by mouth 2 (two) times daily. 60 tablet 2    sertraline (ZOLOFT) 50 MG tablet Take 50 mg by mouth.      zolpidem (AMBIEN) 10 mg Tab Take 1 tablet (10 mg total) by mouth nightly as needed (insomnia). Ambien 10 mg tablet  Take 1 tablet every day by oral route. 30 tablet 0    amlodipine-olmesartan (AMBER) 10-40 mg per tablet Take 1 tablet by mouth once daily. 90 tablet 1     No current facility-administered medications for this visit.       Patient Care Team:  Tricia Ocasio MD as PCP - General (Internal Medicine)  Lauren Awad MD as Obstetrician (Obstetrics)         - The patient is given an After Visit Summary that lists all medications with directions, allergies, education, orders placed during this encounter and follow-up instructions.      - I have reviewed the patient's medical information including past medical, family, and social history sections including the medications and allergies.      - We discussed the patient's current medications.     This note was created by combination of typed  and MModal dictation.  Transcription errors may be present.  If there are any questions, please contact me.     This note was generated with the assistance of ambient listening technology. Verbal consent was obtained by the patient and accompanying visitor(s) for the recording of patient appointment to facilitate this note. I attest to having reviewed and edited the generated note for accuracy, though some syntax or spelling errors may persist. Please contact the author of this note for any clarification.          Elvira Finney PA-C

## 2024-12-09 NOTE — PROGRESS NOTES
Health Maintenance Due   Topic     DEXA Scan      Colorectal Cancer Screening  CONSULT WITH PCP    RSV Vaccine (Age 60+ and Pregnant patients) (1 - Risk 60-74 years 1-dose series) Not given at this facility       Mammogram      COVID-19 Vaccine (6 - 2024-25 season) Pt will call to schedule when ready     Foot Exam  CONSULT WITH PCP

## 2024-12-11 ENCOUNTER — PATIENT MESSAGE (OUTPATIENT)
Dept: ADMINISTRATIVE | Facility: HOSPITAL | Age: 64
End: 2024-12-11
Payer: COMMERCIAL

## 2025-01-08 ENCOUNTER — TELEPHONE (OUTPATIENT)
Dept: FAMILY MEDICINE | Facility: CLINIC | Age: 65
End: 2025-01-08
Payer: COMMERCIAL

## 2025-01-10 ENCOUNTER — TELEPHONE (OUTPATIENT)
Dept: FAMILY MEDICINE | Facility: CLINIC | Age: 65
End: 2025-01-10
Payer: COMMERCIAL

## 2025-01-10 NOTE — TELEPHONE ENCOUNTER
----- Message from Bridget sent at 2025  5:02 PM CST -----  Type:  Needs Medical Advice    Who Called: Rep Valenzuela from [facility]    Would the patient rather a call back or a response via [facility]? Call back     Best Call Back Number: [phone] ref # [id number]     Additional Information: Rep states she would like further information about authorization for medication Trulicity. Rep states the request will  1/10 at 11:09 am CT. Rep would like call back with further assistance.

## 2025-01-10 NOTE — TELEPHONE ENCOUNTER
----- Message from Raven sent at 1/10/2025  8:42 AM CST -----  Regarding: [facility]                                                            Provider Information        Provider: [facility]         Message:requesting to verify diagnoses for patients        Contact Info:[phone] ask for [facility]        Additional Info: Ref# [id number]

## 2025-01-10 NOTE — TELEPHONE ENCOUNTER
Spoke to rep regarding trulicity; she states qty does not exceed plan limit; ref# for approval is [id number]

## 2025-02-05 ENCOUNTER — TELEPHONE (OUTPATIENT)
Dept: FAMILY MEDICINE | Facility: CLINIC | Age: 65
End: 2025-02-05
Payer: MEDICARE

## 2025-02-05 NOTE — TELEPHONE ENCOUNTER
----- Message from Sd sent at 2/5/2025 10:33 AM CST -----  Regarding: self    Type: Patient Call Back    Who called:self    What is the request in detail:pt states she thinks she's on too many blood pressure pills. Wants to verify with the office.     Can the clinic reply by [facility]? No    Would the patient rather a call back or a response via [facility]? Call back    Best call back number:[phone]      Additional Information:    Thank you.

## 2025-02-05 NOTE — TELEPHONE ENCOUNTER
----- Message from Sd sent at 2/5/2025 10:44 AM CST -----  Regarding: SELF    Type: Patient Call Back    Who called:SELF    What is the request in detail:PT WANTS TO SPEAK WITH THE NURSE TO CLARIFY MEDICATIONS.     Can the clinic reply by [facility]? No    Would the patient rather a call back or a response via [facility]? Call back    Best call back number:[phone]      Additional Information:    Thank you.

## 2025-02-10 ENCOUNTER — TELEPHONE (OUTPATIENT)
Dept: ADMINISTRATIVE | Facility: HOSPITAL | Age: 65
End: 2025-02-10
Payer: MEDICARE

## 2025-02-10 ENCOUNTER — PATIENT OUTREACH (OUTPATIENT)
Dept: ADMINISTRATIVE | Facility: HOSPITAL | Age: 65
End: 2025-02-10
Payer: MEDICARE

## 2025-02-10 DIAGNOSIS — M79.89 LEG SWELLING: ICD-10-CM

## 2025-02-10 RX ORDER — FUROSEMIDE 40 MG/1
40 TABLET ORAL 2 TIMES DAILY
Qty: 60 TABLET | Refills: 2 | Status: SHIPPED | OUTPATIENT
Start: 2025-02-10 | End: 2025-05-11

## 2025-02-10 NOTE — PROGRESS NOTES
Population Health Chart Review & Patient Outreach Details      Additional [facility] Health Notes:    Need remote blood pressure reading.  Unable to reach.  Sent portal message.            Updates Requested / Reviewed:      Updated Care Coordination Note, Care Everywhere, and Care Team Updated         Health Maintenance Topics Overdue:      VBHM Score: 5     Colon Cancer Screening  Osteoporosis Screening  Mammogram  Foot Exam  Uncontrolled BP    RSV Vaccine                  Health Maintenance Topic(s) Outreach Outcomes & Actions Taken:    Blood Pressure - Outreach Outcomes & Actions Taken  : unable to reach. Sent portal message.

## 2025-02-10 NOTE — TELEPHONE ENCOUNTER
No answer. Unable to LM. Sent portal message to get remote blood pressure reading. Place two weeks reminder to call patient back if no update.

## 2025-02-10 NOTE — TELEPHONE ENCOUNTER
Care Due:                  Date            Visit Type   Department     Provider  --------------------------------------------------------------------------------                                EP -                              PRIMARY      [facility]  Last Visit: 11-      CARE ([facility])   RADHIKA Ocasio                              ESTABLISHED   [facility]  Next Visit: 02-      PATIENT      MEDICINE       Tricia Ocasio                                                            Last  Test          Frequency    Reason                     Performed    Due Date  --------------------------------------------------------------------------------    CMP.........  12 months..  atorvastatin.............  04- 04-    Health [facility] Embedded Care Due Messages. Reference number: [id number].   2/10/2025 11:05:58 AM CST

## 2025-02-10 NOTE — TELEPHONE ENCOUNTER
----- Message from Tricia Oacsio MD sent at 2/10/2025  1:59 PM CST -----  Riley Alonso,     Please follow up with patient's home blood pressure readings. Thank you!     Best,  Dr. Ocasio

## 2025-02-19 ENCOUNTER — OFFICE VISIT (OUTPATIENT)
Dept: FAMILY MEDICINE | Facility: CLINIC | Age: 65
End: 2025-02-19
Payer: MEDICARE

## 2025-02-19 VITALS
DIASTOLIC BLOOD PRESSURE: 88 MMHG | HEIGHT: 65 IN | HEART RATE: 65 BPM | SYSTOLIC BLOOD PRESSURE: 130 MMHG | BODY MASS INDEX: 24.75 KG/M2 | WEIGHT: 148.56 LBS | RESPIRATION RATE: 18 BRPM | TEMPERATURE: 98 F | OXYGEN SATURATION: 99 %

## 2025-02-19 DIAGNOSIS — R63.0 LOSS OF APPETITE: Primary | ICD-10-CM

## 2025-02-19 DIAGNOSIS — R61 NIGHT SWEATS: ICD-10-CM

## 2025-02-19 DIAGNOSIS — G47.09 OTHER INSOMNIA: ICD-10-CM

## 2025-02-19 DIAGNOSIS — R41.89 COGNITIVE IMPAIRMENT: ICD-10-CM

## 2025-02-19 DIAGNOSIS — F33.1 MODERATE EPISODE OF RECURRENT MAJOR DEPRESSIVE DISORDER: ICD-10-CM

## 2025-02-19 DIAGNOSIS — Z12.31 BREAST CANCER SCREENING BY MAMMOGRAM: ICD-10-CM

## 2025-02-19 DIAGNOSIS — I10 BENIGN ESSENTIAL HTN: ICD-10-CM

## 2025-02-19 DIAGNOSIS — Z23 NEED FOR COVID-19 VACCINE: ICD-10-CM

## 2025-02-19 DIAGNOSIS — Z78.0 ASYMPTOMATIC MENOPAUSAL STATE: ICD-10-CM

## 2025-02-19 DIAGNOSIS — E11.65 TYPE 2 DIABETES MELLITUS WITH HYPERGLYCEMIA, WITHOUT LONG-TERM CURRENT USE OF INSULIN: ICD-10-CM

## 2025-02-19 PROBLEM — Z09 FOLLOW-UP EXAM: Status: RESOLVED | Noted: 2024-12-09 | Resolved: 2025-02-19

## 2025-02-19 PROBLEM — E11.3413 TYPE 2 DIABETES MELLITUS WITH BOTH EYES AFFECTED BY SEVERE NONPROLIFERATIVE RETINOPATHY AND MACULAR EDEMA, WITHOUT LONG-TERM CURRENT USE OF INSULIN: Status: RESOLVED | Noted: 2023-06-06 | Resolved: 2025-02-19

## 2025-02-19 PROBLEM — E66.01 SEVERE OBESITY (BMI 35.0-39.9) WITH COMORBIDITY: Status: RESOLVED | Noted: 2024-05-15 | Resolved: 2025-02-19

## 2025-02-19 RX ORDER — LOSARTAN POTASSIUM 100 MG/1
100 TABLET ORAL
COMMUNITY
Start: 2025-01-07 | End: 2025-02-19 | Stop reason: ALTCHOICE

## 2025-02-19 RX ORDER — MIRTAZAPINE 7.5 MG/1
7.5 TABLET, FILM COATED ORAL NIGHTLY
Qty: 90 TABLET | Refills: 0 | Status: SHIPPED | OUTPATIENT
Start: 2025-02-19

## 2025-02-19 RX ORDER — ESCITALOPRAM OXALATE 20 MG/1
20 TABLET ORAL
COMMUNITY
Start: 2025-01-07 | End: 2025-02-19

## 2025-02-19 RX ORDER — ZOLPIDEM TARTRATE 10 MG/1
10 TABLET ORAL NIGHTLY PRN
Qty: 30 TABLET | Refills: 0 | Status: SHIPPED | OUTPATIENT
Start: 2025-02-19

## 2025-02-19 RX ORDER — AMLODIPINE AND OLMESARTAN MEDOXOMIL 10; 40 MG/1; MG/1
1 TABLET ORAL DAILY
Qty: 90 TABLET | Refills: 3 | Status: SHIPPED | OUTPATIENT
Start: 2025-02-19 | End: 2026-02-19

## 2025-02-19 NOTE — ASSESSMENT & PLAN NOTE
- Monitored weight, noting a significant loss of approximately 40 lbs in 2 months.  - Expressed concern about the rapid rate of weight loss, potentially related to cognitive issues and forgetting to eat.  - Explained the possible link between cognitive decline and appetite loss/weight loss.  - Encouraged maintaining a balanced diet without severe food intake restriction.  - Recommend increasing protein intake and suggested protein shakes.  - Prescribed mirtazapine 7.5 mg daily for appetite stimulation.  - Scheduled a follow up in 4 weeks to assess response to mirtazapine.

## 2025-02-19 NOTE — ASSESSMENT & PLAN NOTE
Reports she can complete ADLs and iADLs  - Discussed early signs of dementia, including changes in eating habits.  - Recommend cognitive assessment (MOCA) to evaluate potential memory issues and determine need for further neurological workup.  - Follow up in 6 weeks for MOCA testing.

## 2025-02-19 NOTE — ASSESSMENT & PLAN NOTE
Controlled   Lab Results   Component Value Date    HGBA1C 5.6 11/15/2024     - Monitored A1C, which improved significantly from 11.8 in May 2023 to 5.6 within almost 2 years.  - Continue Trulicity despite reported diarrhea side effect due to its effectiveness in managing diabetes.  - Suggested using Imodium to manage diarrhea.  - Advised maintaining a balanced diet including some carbohydrates, focusing on protein intake, and checking blood sugar, especially when experiencing diaphoresis.  - Acknowledged patient's dietary changes and their positive impact on weight and diabetes management.

## 2025-02-19 NOTE — ASSESSMENT & PLAN NOTE
Chronic, unresolved. Patient has drop in appetite   Still dealing with family issues.     - Evaluated depressive symptoms including poor sleep, depression, and loss of appetite.  - Assessed that depression might be contributing to cognitive issues and appetite loss.  - Prescribed Remeron (mirtazapine) starting at 7.5 mg daily for mood and sleep, with option to increase to 15 mg after 2 weeks, and up to 30 mg if needed.  - Discontinued Lexapro to mitigate potential serotonin syndrome risk with concurrent Zoloft use.  - Continued Zoloft (sertraline) at current dose and Ambien as needed for sleep.  - Scheduled a follow up in 4 weeks to assess response to mirtazapine.

## 2025-02-19 NOTE — ASSESSMENT & PLAN NOTE
- Evaluate correlation between space heater use and night sweats.  - Keep a journal of space heater use and sweating episodes.  - If no correlation is found, pursue further workup.

## 2025-02-19 NOTE — PROGRESS NOTES
Health Maintenance Due   Topic     DEXA Scan      Colorectal Cancer Screening  Consult PCP    RSV Vaccine (Age 60+ and Pregnant patients) (1 - Risk 60-74 years 1-dose series) Not offered at this facility    Mammogram      COVID-19 Vaccine (6 - 2024-25 season) Patient agree    Foot Exam  Consult PCP    Diabetic Eye Exam  Has an eye doctor

## 2025-02-19 NOTE — PROGRESS NOTES
Chief Complaint: Follow-up, Hypertension, and Diabetes      Machelle Morin  is a 64 y.o. year old patient who presents today for     History of Present Illness    CHIEF COMPLAINT:  Machelle presents today for follow up of mood, appetite, and memory concerns.    SLEEP DISTURBANCE:  She reports inability to sleep without medication but is able to sleep with Ambien. She experiences significant distress when discussing her sleep issues. She has had severe night sweats for the past couple months, though not nightly. The episodes are characterized by going to bed feeling cold (using a space heater) but waking up with profuse sweating requiring bed changes and relocation to the couch.    WEIGHT AND DIET:  She reports a 40-pound weight loss over two months. Her diet has shifted primarily to fruits and vegetables with limited protein intake. She frequently skips regular meals and sometimes does not get out of bed to eat. She has eliminated snacking and alcohol consumption. While she denies complete loss of appetite, she expresses being more conscious of her eating habits due to concerns about managing her blood pressure and diabetes.    MEDICATIONS:  She recently weaned off Lexapro as instructed and is currently taking Zoloft (Sertraline). She experiences diarrhea as a medication side effect, particularly after consuming coffee, which she self-treats with Imodium.    COGNITIVE FUNCTION:  She denies any memory issues and maintains independence, including driving and grocery shopping.      ROS:  General: -fever, -chills, -fatigue, -weight gain, +weight loss, -loss of appetite, +night sweats  Eyes: -vision changes, -redness, -discharge  ENT: -ear pain, -nasal congestion, -sore throat  Cardiovascular: -chest pain, -palpitations, -lower extremity edema  Respiratory: -cough, -shortness of breath  Gastrointestinal: -abdominal pain, -nausea, -vomiting, +diarrhea, -constipation, -blood in stool  Genitourinary: -dysuria,  -hematuria, -frequency  Musculoskeletal: -joint pain, -muscle pain  Skin: -rash, -lesion  Neurological: -headache, -dizziness, -numbness, -tingling  Psychiatric: -anxiety, -depression, +sleep difficulty, -memory problems         Past Medical History:   Diagnosis Date    Acute diastolic congestive heart failure 2024    Diabetes mellitus     Hypertension     Hypertensive emergency 2024       Past Surgical History:   Procedure Laterality Date     SECTION      ENDOMETRIAL ABLATION      OVARIAN CYST REMOVAL          Family History   Problem Relation Name Age of Onset    Alcohol abuse Father      Miscarriages / Stillbirths Mother      Hypertension Mother      Cancer Mother      Arthritis Mother      Pancreatic cancer Mother  83    Early death Brother      Early death Brother      Miscarriages / Stillbirths Sister      Asthma Sister      Asthma Daughter      Colon cancer Neg Hx      Diabetes Neg Hx      Stroke Neg Hx          Social History     Socioeconomic History    Marital status:     Number of children: 1   Tobacco Use    Smoking status: Never     Passive exposure: Never    Smokeless tobacco: Never   Substance and Sexual Activity    Alcohol use: Yes     Alcohol/week: 1.0 standard drink of alcohol     Types: 1 Glasses of wine per week     Comment: socially    Drug use: Never    Sexual activity: Not Currently     Partners: Male     Comment:  since  after 38 years marriage     Social Drivers of Health     Financial Resource Strain: Low Risk  (2025)    Overall Financial Resource Strain (CARDIA)     Difficulty of Paying Living Expenses: Not hard at all   Food Insecurity: No Food Insecurity (2025)    Hunger Vital Sign     Worried About Running Out of Food in the Last Year: Never true     Ran Out of Food in the Last Year: Never true   Transportation Needs: No Transportation Needs (2023)    PRAPARE - Transportation     Lack of Transportation (Medical): No     Lack of  Transportation (Non-Medical): No   Physical Activity: Inactive (1/28/2025)    Exercise Vital Sign     Days of Exercise per Week: 0 days     Minutes of Exercise per Session: 0 min   Stress: Stress Concern Present (1/28/2025)    [employer] - Occupational Stress Questionnaire     Feeling of Stress : Very much   Housing Stability: Unknown (1/28/2025)    Housing Stability Vital Sign     Unable to Pay for Housing in the Last Year: No       Current Medications[1]           Objective:      Vitals:    02/19/25 1436   BP: 130/88   Pulse: 65   Resp: 18   Temp: 97.8 °F (36.6 °C)       Physical Exam  Constitutional:       Appearance: Normal appearance.   HENT:      Head: Normocephalic and atraumatic.   Skin:     General: Skin is warm and dry.   Neurological:      General: No focal deficit present.      Mental Status: She is alert and oriented to person, place, and time.   Psychiatric:         Mood and Affect: Mood normal.         Behavior: Behavior normal.          Assessment:       1. Loss of appetite    2. Cognitive impairment    3. Moderate episode of recurrent major depressive disorder    4. Night sweats    5. Type 2 diabetes mellitus with hyperglycemia, without long-term current use of insulin    6. Benign essential HTN    7. Breast cancer screening by mammogram    8. Asymptomatic menopausal state    9. Need for COVID-19 vaccine    10. Other insomnia          Plan:   1. Loss of appetite  Assessment & Plan:  - Monitored weight, noting a significant loss of approximately 40 lbs in 2 months.  - Expressed concern about the rapid rate of weight loss, potentially related to cognitive issues and forgetting to eat.  - Explained the possible link between cognitive decline and appetite loss/weight loss.  - Encouraged maintaining a balanced diet without severe food intake restriction.  - Recommend increasing protein intake and suggested protein shakes.  - Prescribed mirtazapine 7.5 mg daily for appetite  stimulation.  - Scheduled a follow up in 4 weeks to assess response to mirtazapine.    Orders:  -     mirtazapine (REMERON) 7.5 MG Tab; Take 1 tablet (7.5 mg total) by mouth every evening.  Dispense: 90 tablet; Refill: 0    2. Cognitive impairment  Assessment & Plan:  Reports she can complete ADLs and iADLs  - Discussed early signs of dementia, including changes in eating habits.  - Recommend cognitive assessment (MOCA) to evaluate potential memory issues and determine need for further neurological workup.  - Follow up in 6 weeks for MOCA testing.      3. Moderate episode of recurrent major depressive disorder  Assessment & Plan:  Chronic, unresolved. Patient has drop in appetite   Still dealing with family issues.     - Evaluated depressive symptoms including poor sleep, depression, and loss of appetite.  - Assessed that depression might be contributing to cognitive issues and appetite loss.  - Prescribed Remeron (mirtazapine) starting at 7.5 mg daily for mood and sleep, with option to increase to 15 mg after 2 weeks, and up to 30 mg if needed.  - Discontinued Lexapro to mitigate potential serotonin syndrome risk with concurrent Zoloft use.  - Continued Zoloft (sertraline) at current dose and Ambien as needed for sleep.  - Scheduled a follow up in 4 weeks to assess response to mirtazapine.     Orders:  -     mirtazapine (REMERON) 7.5 MG Tab; Take 1 tablet (7.5 mg total) by mouth every evening.  Dispense: 90 tablet; Refill: 0    4. Night sweats  Assessment & Plan:  - Evaluate correlation between space heater use and night sweats.  - Keep a journal of space heater use and sweating episodes.  - If no correlation is found, pursue further workup.      5. Type 2 diabetes mellitus with hyperglycemia, without long-term current use of insulin  Assessment & Plan:  Controlled   Lab Results   Component Value Date    HGBA1C 5.6 11/15/2024     - Monitored A1C, which improved significantly from 11.8 in May 2023 to 5.6 within  almost 2 years.  - Continue Trulicity despite reported diarrhea side effect due to its effectiveness in managing diabetes.  - Suggested using Imodium to manage diarrhea.  - Advised maintaining a balanced diet including some carbohydrates, focusing on protein intake, and checking blood sugar, especially when experiencing diaphoresis.  - Acknowledged patient's dietary changes and their positive impact on weight and diabetes management.      6. Benign essential HTN  Assessment & Plan:  Controlled   - cont amber    Orders:  -     amlodipine-olmesartan (AMBER) 10-40 mg per tablet; Take 1 tablet by mouth once daily.  Dispense: 90 tablet; Refill: 3    7. Breast cancer screening by mammogram  -     Mammo Digital Screening Bilat w/ Jesus (XPD); Future; Expected date: 02/19/2025    8. Asymptomatic menopausal state  -     DXA Bone Density Axial Skeleton 1 or more sites; Future; Expected date: 02/19/2025    9. Need for COVID-19 vaccine  -     COVID-19 (Pfizer) 30 mcg/0.3 mL IM vaccine (>/= 11 yo) 0.3 mL    10. Other insomnia  -     zolpidem (AMBIEN) 10 mg Tab; Take 1 tablet (10 mg total) by mouth nightly as needed (insomnia). Ambien 10 mg tablet  Take 1 tablet every day by oral route.  Dispense: 30 tablet; Refill: 0    LABS:  - Ordered mammogram, bone scan, and thyroid function test.    FOLLOW UP:  - Contact office if any concerns arise before scheduled appointments.       Total 40 mins spent on patient with more than 50% spent counseling    Follow up in about 4 weeks (around 3/19/2025) for f/up (40mins), mammo, dexa, COVID vax.    This note was generated with the assistance of ambient listening technology. Verbal consent was obtained by the patient and accompanying visitor(s) for the recording of patient appointment to facilitate this note. I attest to having reviewed and edited the generated note for accuracy, though some syntax or spelling errors may persist. Please contact the author of this note for any clarification.                   [1]   Current Outpatient Medications:     atorvastatin (LIPITOR) 40 MG tablet, Take 1 tablet (40 mg total) by mouth once daily., Disp: 90 tablet, Rfl: 3    carvediloL (COREG) 25 MG tablet, Take 1 tablet (25 mg total) by mouth 2 (two) times daily with meals., Disp: 180 tablet, Rfl: 3    dulaglutide (TRULICITY) 0.75 mg/0.5 mL pen injector, Inject 0.75 mg into the skin every 7 days., Disp: 4 pen , Rfl: 2    furosemide (LASIX) 40 MG tablet, Take 1 tablet (40 mg total) by mouth 2 (two) times daily., Disp: 60 tablet, Rfl: 2    sertraline (ZOLOFT) 50 MG tablet, Take 50 mg by mouth., Disp: , Rfl:     amlodipine-olmesartan (AMBER) 10-40 mg per tablet, Take 1 tablet by mouth once daily., Disp: 90 tablet, Rfl: 3    mirtazapine (REMERON) 7.5 MG Tab, Take 1 tablet (7.5 mg total) by mouth every evening., Disp: 90 tablet, Rfl: 0    zolpidem (AMBIEN) 10 mg Tab, Take 1 tablet (10 mg total) by mouth nightly as needed (insomnia). Ambien 10 mg tablet  Take 1 tablet every day by oral route., Disp: 30 tablet, Rfl: 0  No current facility-administered medications for this visit.

## 2025-02-24 ENCOUNTER — HOSPITAL ENCOUNTER (EMERGENCY)
Facility: HOSPITAL | Age: 65
Discharge: HOME | End: 2025-02-24
Attending: EMERGENCY MEDICINE
Payer: MEDICARE

## 2025-02-24 VITALS
OXYGEN SATURATION: 100 % | DIASTOLIC BLOOD PRESSURE: 87 MMHG | HEIGHT: 65 IN | HEART RATE: 80 BPM | WEIGHT: 150 LBS | SYSTOLIC BLOOD PRESSURE: 136 MMHG | BODY MASS INDEX: 24.99 KG/M2 | TEMPERATURE: 98 F | RESPIRATION RATE: 16 BRPM

## 2025-02-24 DIAGNOSIS — R41.82 ALTERED MENTAL STATUS, UNSPECIFIED ALTERED MENTAL STATUS TYPE: Primary | ICD-10-CM

## 2025-02-24 DIAGNOSIS — Z13.6 ENCOUNTER FOR SCREENING FOR CARDIOVASCULAR DISORDERS: ICD-10-CM

## 2025-02-24 LAB
ALBUMIN SERPL BCP-MCNC: 3 G/DL (ref 3.5–5.2)
ALP SERPL-CCNC: 56 U/L (ref 40–150)
ALT SERPL W/O P-5'-P-CCNC: 20 U/L (ref 10–44)
ANION GAP SERPL CALC-SCNC: 8 MMOL/L (ref 8–16)
AST SERPL-CCNC: 25 U/L (ref 10–40)
BACTERIA #/AREA URNS HPF: ABNORMAL /HPF
BASOPHILS # BLD AUTO: 0.02 K/UL (ref 0–0.2)
BASOPHILS NFR BLD: 0.4 % (ref 0–1.9)
BILIRUB SERPL-MCNC: 0.3 MG/DL (ref 0.1–1)
BILIRUB UR QL STRIP: NEGATIVE
BUN SERPL-MCNC: 50 MG/DL (ref 8–23)
CALCIUM SERPL-MCNC: 8.4 MG/DL (ref 8.7–10.5)
CHLORIDE SERPL-SCNC: 109 MMOL/L (ref 95–110)
CLARITY UR: CLEAR
CO2 SERPL-SCNC: 21 MMOL/L (ref 23–29)
COLOR UR: YELLOW
CREAT SERPL-MCNC: 1.9 MG/DL (ref 0.5–1.4)
DIFFERENTIAL METHOD BLD: ABNORMAL
EOSINOPHIL # BLD AUTO: 0.2 K/UL (ref 0–0.5)
EOSINOPHIL NFR BLD: 3.8 % (ref 0–8)
ERYTHROCYTE [DISTWIDTH] IN BLOOD BY AUTOMATED COUNT: 13.2 % (ref 11.5–14.5)
EST. GFR  (NO RACE VARIABLE): 29 ML/MIN/1.73 M^2
GLUCOSE SERPL-MCNC: 140 MG/DL (ref 70–110)
GLUCOSE UR QL STRIP: ABNORMAL
HCT VFR BLD AUTO: 32.4 % (ref 37–48.5)
HGB BLD-MCNC: 10.3 G/DL (ref 12–16)
HGB UR QL STRIP: ABNORMAL
HYALINE CASTS #/AREA URNS LPF: 1 /LPF
IMM GRANULOCYTES # BLD AUTO: 0.01 K/UL (ref 0–0.04)
IMM GRANULOCYTES NFR BLD AUTO: 0.2 % (ref 0–0.5)
KETONES UR QL STRIP: NEGATIVE
LEUKOCYTE ESTERASE UR QL STRIP: ABNORMAL
LYMPHOCYTES # BLD AUTO: 1.6 K/UL (ref 1–4.8)
LYMPHOCYTES NFR BLD: 29.2 % (ref 18–48)
MCH RBC QN AUTO: 30.7 PG (ref 27–31)
MCHC RBC AUTO-ENTMCNC: 31.8 G/DL (ref 32–36)
MCV RBC AUTO: 96 FL (ref 82–98)
MICROSCOPIC COMMENT: ABNORMAL
MONOCYTES # BLD AUTO: 0.3 K/UL (ref 0.3–1)
MONOCYTES NFR BLD: 4.9 % (ref 4–15)
NEUTROPHILS # BLD AUTO: 3.3 K/UL (ref 1.8–7.7)
NEUTROPHILS NFR BLD: 61.5 % (ref 38–73)
NITRITE UR QL STRIP: NEGATIVE
NON-SQ EPI CELLS #/AREA URNS HPF: 0 /HPF
NRBC BLD-RTO: 0 /100 WBC
PH UR STRIP: 7 [PH] (ref 5–8)
PLATELET # BLD AUTO: 120 K/UL (ref 150–450)
PMV BLD AUTO: 11.7 FL (ref 9.2–12.9)
POCT GLUCOSE: 147 MG/DL (ref 70–110)
POTASSIUM SERPL-SCNC: 4.4 MMOL/L (ref 3.5–5.1)
PROT SERPL-MCNC: 7.1 G/DL (ref 6–8.4)
PROT UR QL STRIP: ABNORMAL
RBC # BLD AUTO: 3.36 M/UL (ref 4–5.4)
RBC #/AREA URNS HPF: 8 /HPF (ref 0–4)
SODIUM SERPL-SCNC: 138 MMOL/L (ref 136–145)
SP GR UR STRIP: 1.01 (ref 1–1.03)
URN SPEC COLLECT METH UR: ABNORMAL
UROBILINOGEN UR STRIP-ACNC: NEGATIVE EU/DL
WBC # BLD AUTO: 5.31 K/UL (ref 3.9–12.7)
WBC #/AREA URNS HPF: 1 /HPF (ref 0–5)

## 2025-02-24 PROCEDURE — 82962 GLUCOSE BLOOD TEST: CPT

## 2025-02-24 PROCEDURE — 93005 ELECTROCARDIOGRAM TRACING: CPT

## 2025-02-24 PROCEDURE — 96374 THER/PROPH/DIAG INJ IV PUSH: CPT

## 2025-02-24 PROCEDURE — 93010 ELECTROCARDIOGRAM REPORT: CPT | Mod: ,,, | Performed by: INTERNAL MEDICINE

## 2025-02-24 PROCEDURE — 99285 EMERGENCY DEPT VISIT HI MDM: CPT | Mod: 25

## 2025-02-24 PROCEDURE — 25000003 PHARM REV CODE 250

## 2025-02-24 PROCEDURE — 80053 COMPREHEN METABOLIC PANEL: CPT

## 2025-02-24 PROCEDURE — 63600175 PHARM REV CODE 636 W HCPCS

## 2025-02-24 PROCEDURE — 81000 URINALYSIS NONAUTO W/SCOPE: CPT

## 2025-02-24 PROCEDURE — 85025 COMPLETE CBC W/AUTO DIFF WBC: CPT

## 2025-02-24 RX ORDER — CLONIDINE HYDROCHLORIDE 0.1 MG/1
0.1 TABLET ORAL
Status: COMPLETED | OUTPATIENT
Start: 2025-02-24 | End: 2025-02-24

## 2025-02-24 RX ORDER — HYDRALAZINE HYDROCHLORIDE 20 MG/ML
10 INJECTION INTRAMUSCULAR; INTRAVENOUS
Status: COMPLETED | OUTPATIENT
Start: 2025-02-24 | End: 2025-02-24

## 2025-02-24 RX ORDER — HYDRALAZINE HYDROCHLORIDE 20 MG/ML
10 INJECTION INTRAMUSCULAR; INTRAVENOUS
Status: DISCONTINUED | OUTPATIENT
Start: 2025-02-24 | End: 2025-02-24 | Stop reason: HOSPADM

## 2025-02-24 RX ADMIN — HYDRALAZINE HYDROCHLORIDE 10 MG: 20 INJECTION INTRAMUSCULAR; INTRAVENOUS at 09:02

## 2025-02-24 RX ADMIN — CLONIDINE HYDROCHLORIDE 0.1 MG: 0.1 TABLET ORAL at 09:02

## 2025-02-24 NOTE — DISCHARGE INSTRUCTIONS
You were seen at the emergency department for your altered mental status.  Please follow up with Neurology for further workup.  Please return to the emergency department if your symptoms worsen or change.  Please hold often your morning medications.  Please take your night blood pressure medications as prescribed.

## 2025-02-24 NOTE — ED PROVIDER NOTES
Encounter Date: 2025       History     Chief Complaint   Patient presents with    Altered Mental Status     Pt [facility] for abscence sz vs AMS related to alzheimers. Family saw her this AM and states she has been doing this for a few days. Pt was not speaking to EMS until a few minutes ago, but was able to nod head in response appropriately.     Patient is a 64-year-old female past medical history of Alzheimer's dementia, CHF, diabetes, hypertension that presents to the emergency department for altered mental status.  Per EMS the family states that over the last few days she has been having episodes of not responding wall awake when asked a question.  She states that she feels like she is in normal state of health.  No new symptoms for her.  No history of trauma, fevers, chest pain, shortness of breath, nausea vomiting diarrhea, dysuria.  Reports that she has been compliant with her medications but did not take them this morning before going to the emergency department.    Per family the patient has not been worked up for dementia before but her primary care physician stated that she might have it a few months ago.  They deny any seizure-like activity.  They mainly because family member metastatic brain cancer so they brought her to the emergency department today.        Review of patient's allergies indicates:   Allergen Reactions    Sulfa (sulfonamide antibiotics) Itching    Cephalexin Nausea And Vomiting     Past Medical History:   Diagnosis Date    Acute diastolic congestive heart failure 2024    Diabetes mellitus     Hypertension     Hypertensive emergency 2024     Past Surgical History:   Procedure Laterality Date     SECTION      ENDOMETRIAL ABLATION      OVARIAN CYST REMOVAL       Family History   Problem Relation Name Age of Onset    Alcohol abuse Father      Miscarriages / Stillbirths Mother      Hypertension Mother      Cancer Mother      Arthritis Mother       Pancreatic cancer Mother  83    Early death Brother      Early death Brother      Miscarriages / Stillbirths Sister      Asthma Sister      Asthma Daughter      Colon cancer Neg Hx      Diabetes Neg Hx      Stroke Neg Hx       Social History[1]  Review of Systems    Physical Exam     Initial Vitals [02/24/25 0714]   BP Pulse Resp Temp SpO2   (!) 196/100 62 18 98.2 °F (36.8 °C) 100 %      MAP       --         Physical Exam  Gen: AxOx3, well nourished, appears stated age, no pallor, no jaundice, appears well hydrated  Eye: EOMI, no scleral icterus, no periorbital edema or ecchymosis  Head: Normocephalic, atraumatic, no lesions, scalp appears normal  ENT: Neck supple, no stridor, no masses, no drooling or voice changes  CVS: All distal pulses intact with normal rate and rhythm, no JVD, normal S1/S2, no murmur  Pulm: Normal breath sounds, no wheezes, rales or rhonchi, no increased work of breathing  Abd:  Nondistended, soft, nontender, no organomegaly, no CVAT  Ext: No edema, no lesions, rashes, or deformity  Neuro: GCS15, moving all extremities, gait intact, face grossly symmetric  Psych: normal affect, cooperative, well groomed, makes good eye contact    ED Course   Procedures  Labs Reviewed   CBC W/ AUTO DIFFERENTIAL - Abnormal       Result Value    WBC 5.31      RBC 3.36 (*)     Hemoglobin 10.3 (*)     Hematocrit 32.4 (*)     MCV 96      MCH 30.7      MCHC 31.8 (*)     RDW 13.2      Platelets 120 (*)     MPV 11.7      Immature Granulocytes 0.2      Gran # (ANC) 3.3      Immature Grans (Abs) 0.01      Lymph # 1.6      Mono # 0.3      Eos # 0.2      Baso # 0.02      nRBC 0      Gran % 61.5      Lymph % 29.2      Mono % 4.9      Eosinophil % 3.8      Basophil % 0.4      Differential Method Automated     COMPREHENSIVE METABOLIC PANEL - Abnormal    Sodium 138      Potassium 4.4      Chloride 109      CO2 21 (*)     Glucose 140 (*)     BUN 50 (*)     Creatinine 1.9 (*)     Calcium 8.4 (*)     Total Protein 7.1       Albumin 3.0 (*)     Total Bilirubin 0.3      Alkaline Phosphatase 56      AST 25      ALT 20      eGFR 29 (*)     Anion Gap 8     URINALYSIS, REFLEX TO URINE CULTURE - Abnormal    Specimen UA Urine, Clean Catch      Color, UA Yellow      Appearance, UA Clear      pH, UA 7.0      Specific Gravity, UA 1.010      Protein, UA 2+ (*)     Glucose, UA Trace (*)     Ketones, UA Negative      Bilirubin (UA) Negative      Occult Blood UA 1+ (*)     Nitrite, UA Negative      Urobilinogen, UA Negative      Leukocytes, UA 1+ (*)     Narrative:     Specimen Source->Urine   URINALYSIS MICROSCOPIC - Abnormal    RBC, UA 8 (*)     WBC, UA 1      Bacteria Rare      Non-Squam Epith 0      Hyaline Casts, UA 1      Microscopic Comment SEE COMMENT      Narrative:     Specimen Source->Urine   POCT GLUCOSE - Abnormal    POCT Glucose 147 (*)           Imaging Results              CT Head Without Contrast (Final result)  Result time 02/24/25 09:09:11      Final result by Ernesto St MD (02/24/25 09:09:11)                   Impression:      1. No evidence of acute intracranial hemorrhage, mass effect, or midline shift.  2. Technically age indeterminate infarcts involving the right cerebellum.  MRI may be considered for more sensitive and specific assessment.      Electronically signed by: Ernesto St  Date:    02/24/2025  Time:    09:09               Narrative:    EXAMINATION:  CT HEAD WITHOUT CONTRAST    CLINICAL HISTORY:  Mental status change, unknown cause;    TECHNIQUE:  Low dose axial images were obtained through the head.  Coronal and sagittal reformations were also performed. Contrast was not administered.    COMPARISON:  None.    FINDINGS:  Blood: No acute intracranial hemorrhage.    Parenchyma: Technically age indeterminate infarcts involving the right cerebellum.  Elsewhere, generalized pattern of age-related parenchymal volume loss.  Nonspecific areas of white matter hypoattenuation may reflect sequela of chronic small  vessel ischemic disease.    Ventricles/Extra-axial spaces: No abnormal extra-axial fluid collection. Basal cisterns are patent.    Vessels: Moderate atherosclerotic calcification.    Orbits: Status post bilateral lens replacements.    Scalp: Unremarkable.    Skull: There are no depressed skull fractures or destructive bone lesions.    Sinuses and mastoids: Essentially clear.    Other findings: None                                       Medications   hydrALAZINE injection 10 mg (10 mg Intravenous Not Given 2/24/25 1030)   cloNIDine tablet 0.1 mg (0.1 mg Oral Given 2/24/25 0954)   hydrALAZINE injection 10 mg (10 mg Intravenous Given 2/24/25 0956)     Medical Decision Making  Machelle Morin is a 64 y.o. female presenting to the ED with altered mental status. History and physical exam as above. Initial vital signs stable and non-actionable. Initial work-up to include:  See above    Differential diagnosis considered for this patient includes, but is not limited to:  Conversion disorder, memory disorder.  Other severe, more emergent diagnoses considered, but deemed much less likely, to include:  Seizure as no postictal phase, urinary incontinence, tongue biting; intracranial hemorrhage, brain mass.    Workup fairly unremarkable.    Patient is being discharged in went to the restroom she then had an episode which were family describes is the ones that she has been having.  She became aphasic but eyes were open inpatient seemed alert.  Resolved within a minute or so.  Upon re-evaluation after she then became oriented fairly quickly and new where she was, who she was, her birthday.  Family felt safe to be discharged to home and follow up with Neurology as an outpatient.      Amount and/or Complexity of Data Reviewed  Labs: ordered. Decision-making details documented in ED Course.  Radiology: ordered. Decision-making details documented in ED Course.  ECG/medicine tests:  Decision-making details documented in ED  Course.    Risk  Prescription drug management.               ED Course as of 02/24/25 1346   Mon Feb 24, 2025   0742 EKG 12-lead  As per my independent interpretation normal sinus rhythm with first-degree AV block, otherwise normal intervals, normal axis, no STEMI [HJ]   0806 CBC auto differential(!)  CBC unremarkable, no leukocytosis, thrombocytopenia, anemia [HJ]   0806 POCT Glucose(!): 147 [HJ]   0912 We discussed the case with Neurology, they state that the patient can be seen in outpatient clinic if workup is negative. [HJ]   0912 CT Head Without Contrast  As per my independent interpretation no mass or intracranial hemorrhage seen. [HJ]   0915 Comprehensive metabolic panel(!)  CMP acutely unremarkable [HJ]   0940 Urinalysis, Reflex to Urine Culture Urine, Clean Catch(!)  Less concern for UTI with no urinary symptoms. [HJ]   1342 Patient's blood pressure was elevated we will treat with hydralazine and clonidine. [HJ]      ED Course User Index  [HJ] Nick Keller MD          Resident supervising staff physician attestation note: This is doctor Kinney dictating.  This patient presents emergency room with a gradually progressive confusion over the course of the last 4 months.  The primary care doctor suggested the patient may have dementia.  The patient has never had brain imaging.  She is otherwise well.  She is confused without focal neurologic deficit.  There is no history of fever painful urination.  She is here with 2 daughters.  We will check for UTIs and metabolic problems provoking mental status change.  We will scan this patient's head.  We contacted Neurology who suggest that the patient be referred to outpatient Neurology for further evaluation and treatment.  I agree with the resident documentation, diagnostic and treatment plan.  This is doctor Kinney dictating.                 Clinical Impression:  Final diagnoses:  [Z13.6] Encounter for screening for cardiovascular disorders  [R41.82] Altered  mental status, unspecified altered mental status type (Primary)          ED Disposition Condition    Discharge           ED Prescriptions    None       Follow-up Information       Follow up With Specialties Details Why Contact Info    Leigh Hobbs MD Neurology Schedule an appointment as soon as possible for a visit   [address] [facility] [address]  [facility] [address]  [phone]      [facility] Emergency Medicine Go to  As needed, If symptoms worsen [address] [facility] [place] [id number] [phone]                 [1]   Social History  Tobacco Use    Smoking status: Never     Passive exposure: Never    Smokeless tobacco: Never   Substance Use Topics    Alcohol use: Yes     Alcohol/week: 1.0 standard drink of alcohol     Types: 1 Glasses of wine per week     Comment: socially    Drug use: Never        Nick Keller MD  Resident  02/24/25 5001

## 2025-02-24 NOTE — ED TRIAGE NOTES
Pt presents to ED via EMS with c/o altered mental status. Per EMS, they were dispatched to pts home by family when she had an episode of staring off and not verbally responding for several minutes. This has apparently happened several time of the last few days. Hx dementia. Upon arrival to ED, Pt AAOx4. Slow to respond verbally. Family is en route at this time. No c/o voiced. Hypertensive, has not taken medications today.

## 2025-02-25 ENCOUNTER — TELEPHONE (OUTPATIENT)
Dept: FAMILY MEDICINE | Facility: CLINIC | Age: 65
End: 2025-02-25
Payer: MEDICARE

## 2025-02-25 ENCOUNTER — HOSPITAL ENCOUNTER (EMERGENCY)
Facility: HOSPITAL | Age: 65
Discharge: HOME | End: 2025-02-25
Attending: EMERGENCY MEDICINE
Payer: MEDICARE

## 2025-02-25 VITALS
DIASTOLIC BLOOD PRESSURE: 95 MMHG | WEIGHT: 141 LBS | HEIGHT: 65 IN | HEART RATE: 72 BPM | SYSTOLIC BLOOD PRESSURE: 177 MMHG | RESPIRATION RATE: 17 BRPM | OXYGEN SATURATION: 99 % | BODY MASS INDEX: 23.49 KG/M2 | TEMPERATURE: 98 F

## 2025-02-25 DIAGNOSIS — I63.9 CEREBROVASCULAR ACCIDENT (CVA), UNSPECIFIED MECHANISM: ICD-10-CM

## 2025-02-25 DIAGNOSIS — R55 SYNCOPE: ICD-10-CM

## 2025-02-25 DIAGNOSIS — N39.0 URINARY TRACT INFECTION WITHOUT HEMATURIA, SITE UNSPECIFIED: Primary | ICD-10-CM
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
## 2025-02-25 LAB
ALBUMIN SERPL BCP-MCNC: 3.1 G/DL (ref 3.5–5.2)
ALP SERPL-CCNC: 59 U/L (ref 40–150)
ALT SERPL W/O P-5'-P-CCNC: 14 U/L (ref 10–44)
AMPHET+METHAMPHET UR QL: NEGATIVE
ANION GAP SERPL CALC-SCNC: 8 MMOL/L (ref 8–16)
AST SERPL-CCNC: 25 U/L (ref 10–40)
BACTERIA #/AREA URNS AUTO: ABNORMAL /HPF
BARBITURATES UR QL SCN>200 NG/ML: NEGATIVE
BASOPHILS # BLD AUTO: 0.02 K/UL (ref 0–0.2)
BASOPHILS NFR BLD: 0.3 % (ref 0–1.9)
BENZODIAZ UR QL SCN>200 NG/ML: NEGATIVE
BILIRUB SERPL-MCNC: 0.3 MG/DL (ref 0.1–1)
BILIRUB UR QL STRIP: NEGATIVE
BUN SERPL-MCNC: 51 MG/DL (ref 8–23)
BZE UR QL SCN: NEGATIVE
CALCIUM SERPL-MCNC: 8.7 MG/DL (ref 8.7–10.5)
CANNABINOIDS UR QL SCN: NEGATIVE
CHLORIDE SERPL-SCNC: 105 MMOL/L (ref 95–110)
CK SERPL-CCNC: 204 U/L (ref 20–180)
CLARITY UR REFRACT.AUTO: CLEAR
CO2 SERPL-SCNC: 25 MMOL/L (ref 23–29)
COLOR UR AUTO: YELLOW
CREAT SERPL-MCNC: 1.9 MG/DL (ref 0.5–1.4)
CREAT UR-MCNC: 87 MG/DL (ref 15–325)
DIFFERENTIAL METHOD BLD: ABNORMAL
EOSINOPHIL # BLD AUTO: 0.1 K/UL (ref 0–0.5)
EOSINOPHIL NFR BLD: 2.2 % (ref 0–8)
ERYTHROCYTE [DISTWIDTH] IN BLOOD BY AUTOMATED COUNT: 13.2 % (ref 11.5–14.5)
EST. GFR  (NO RACE VARIABLE): 28.9 ML/MIN/1.73 M^2
GLUCOSE SERPL-MCNC: 122 MG/DL (ref 70–110)
GLUCOSE UR QL STRIP: NEGATIVE
HCT VFR BLD AUTO: 34.3 % (ref 37–48.5)
HCV AB SERPL QL IA: NORMAL
HGB BLD-MCNC: 10.6 G/DL (ref 12–16)
HGB UR QL STRIP: ABNORMAL
HIV 1+2 AB+HIV1 P24 AG SERPL QL IA: NORMAL
HYALINE CASTS UR QL AUTO: 7 /LPF
IMM GRANULOCYTES # BLD AUTO: 0.02 K/UL (ref 0–0.04)
IMM GRANULOCYTES NFR BLD AUTO: 0.3 % (ref 0–0.5)
KETONES UR QL STRIP: NEGATIVE
LEUKOCYTE ESTERASE UR QL STRIP: ABNORMAL
LYMPHOCYTES # BLD AUTO: 1.8 K/UL (ref 1–4.8)
LYMPHOCYTES NFR BLD: 29.4 % (ref 18–48)
MAGNESIUM SERPL-MCNC: 2.1 MG/DL (ref 1.6–2.6)
MCH RBC QN AUTO: 30 PG (ref 27–31)
MCHC RBC AUTO-ENTMCNC: 30.9 G/DL (ref 32–36)
MCV RBC AUTO: 97 FL (ref 82–98)
METHADONE UR QL SCN>300 NG/ML: NEGATIVE
MICROSCOPIC COMMENT: ABNORMAL
MONOCYTES # BLD AUTO: 0.3 K/UL (ref 0.3–1)
MONOCYTES NFR BLD: 5 % (ref 4–15)
NEUTROPHILS # BLD AUTO: 3.8 K/UL (ref 1.8–7.7)
NEUTROPHILS NFR BLD: 62.8 % (ref 38–73)
NITRITE UR QL STRIP: NEGATIVE
NON-SQ EPI CELLS #/AREA URNS AUTO: 3 /HPF
NRBC BLD-RTO: 0 /100 WBC
OHS QRS DURATION: 88 MS
OHS QTC CALCULATION: 411 MS
OPIATES UR QL SCN: NEGATIVE
PCP UR QL SCN>25 NG/ML: NEGATIVE
PH UR STRIP: 6 [PH] (ref 5–8)
PHOSPHATE SERPL-MCNC: 4.3 MG/DL (ref 2.7–4.5)
PLATELET # BLD AUTO: 144 K/UL (ref 150–450)
PMV BLD AUTO: 11.7 FL (ref 9.2–12.9)
POTASSIUM SERPL-SCNC: 4.2 MMOL/L (ref 3.5–5.1)
PROT SERPL-MCNC: 7.1 G/DL (ref 6–8.4)
PROT UR QL STRIP: ABNORMAL
RBC # BLD AUTO: 3.53 M/UL (ref 4–5.4)
RBC #/AREA URNS AUTO: 10 /HPF (ref 0–4)
SODIUM SERPL-SCNC: 138 MMOL/L (ref 136–145)
SP GR UR STRIP: 1.01 (ref 1–1.03)
SQUAMOUS #/AREA URNS AUTO: 2 /HPF
TOXICOLOGY INFORMATION: NORMAL
TROPONIN I SERPL DL<=0.01 NG/ML-MCNC: 11 NG/L (ref 0–14)
URN SPEC COLLECT METH UR: ABNORMAL
WBC # BLD AUTO: 6.02 K/UL (ref 3.9–12.7)
WBC #/AREA URNS AUTO: 50 /HPF (ref 0–5)

## 2025-02-25 PROCEDURE — 87389 HIV-1 AG W/HIV-1&-2 AB AG IA: CPT | Performed by: PHYSICIAN ASSISTANT

## 2025-02-25 PROCEDURE — 85025 COMPLETE CBC W/AUTO DIFF WBC: CPT | Performed by: STUDENT IN AN ORGANIZED HEALTH CARE EDUCATION/TRAINING PROGRAM

## 2025-02-25 PROCEDURE — 81001 URINALYSIS AUTO W/SCOPE: CPT | Mod: XB | Performed by: STUDENT IN AN ORGANIZED HEALTH CARE EDUCATION/TRAINING PROGRAM

## 2025-02-25 PROCEDURE — 80053 COMPREHEN METABOLIC PANEL: CPT | Performed by: STUDENT IN AN ORGANIZED HEALTH CARE EDUCATION/TRAINING PROGRAM

## 2025-02-25 PROCEDURE — 99285 EMERGENCY DEPT VISIT HI MDM: CPT | Mod: 25

## 2025-02-25 PROCEDURE — 99284 EMERGENCY DEPT VISIT MOD MDM: CPT | Mod: GC,,, | Performed by: PSYCHIATRY & NEUROLOGY

## 2025-02-25 PROCEDURE — 84484 ASSAY OF TROPONIN QUANT: CPT | Performed by: PHYSICIAN ASSISTANT

## 2025-02-25 PROCEDURE — 96374 THER/PROPH/DIAG INJ IV PUSH: CPT

## 2025-02-25 PROCEDURE — 80307 DRUG TEST PRSMV CHEM ANLYZR: CPT | Performed by: STUDENT IN AN ORGANIZED HEALTH CARE EDUCATION/TRAINING PROGRAM

## 2025-02-25 PROCEDURE — 86803 HEPATITIS C AB TEST: CPT | Performed by: PHYSICIAN ASSISTANT

## 2025-02-25 PROCEDURE — 83735 ASSAY OF MAGNESIUM: CPT | Performed by: STUDENT IN AN ORGANIZED HEALTH CARE EDUCATION/TRAINING PROGRAM

## 2025-02-25 PROCEDURE — 93005 ELECTROCARDIOGRAM TRACING: CPT

## 2025-02-25 PROCEDURE — 82550 ASSAY OF CK (CPK): CPT | Performed by: PHYSICIAN ASSISTANT

## 2025-02-25 PROCEDURE — 63600175 PHARM REV CODE 636 W HCPCS: Performed by: PHYSICIAN ASSISTANT

## 2025-02-25 PROCEDURE — 87086 URINE CULTURE/COLONY COUNT: CPT | Performed by: STUDENT IN AN ORGANIZED HEALTH CARE EDUCATION/TRAINING PROGRAM

## 2025-02-25 PROCEDURE — 84100 ASSAY OF PHOSPHORUS: CPT | Performed by: STUDENT IN AN ORGANIZED HEALTH CARE EDUCATION/TRAINING PROGRAM

## 2025-02-25 RX ORDER — ASPIRIN 81 MG/1
81 TABLET ORAL DAILY
Qty: 30 TABLET | Refills: 2 | Status: SHIPPED | OUTPATIENT
Start: 2025-02-25 | End: 2026-02-25

## 2025-02-25 RX ORDER — CIPROFLOXACIN 250 MG/1
250 TABLET, FILM COATED ORAL 2 TIMES DAILY
Qty: 14 TABLET | Refills: 0 | Status: SHIPPED | OUTPATIENT
Start: 2025-02-25 | End: 2025-03-04

## 2025-02-25 RX ORDER — CEFTRIAXONE 1 G/1
1 INJECTION, POWDER, FOR SOLUTION INTRAMUSCULAR; INTRAVENOUS
Status: COMPLETED | OUTPATIENT
Start: 2025-02-25 | End: 2025-02-25

## 2025-02-25 RX ADMIN — CEFTRIAXONE SODIUM 1 G: 1 INJECTION, POWDER, FOR SOLUTION INTRAMUSCULAR; INTRAVENOUS at 10:02

## 2025-02-25 NOTE — TELEPHONE ENCOUNTER
----- Message from Med Assistant Veda sent at 2/25/2025  9:40 AM CST -----  Type:  Needs Medical Advice/Symptom-based CallWho Called:Chucho (Daughter)Symptoms (please be specific):falling down this morning ( on side ) , confusion , trouble walking . No complaints of pain , doesn't remember family members and gait issues How long has patient had these symptoms:  Today Pharmacy:[id number] - [place] - [address] AT [facility] [address] [id number]: [phone] Fax: [phone] Would the patient rather a call back or a response via [facility]?Best Call Back Number:Telephone Information:Mobile          [phone] Additional Information: Taking her back to ED

## 2025-02-25 NOTE — FIRST PROVIDER EVALUATION
"Medical screening examination initiated.  I have conducted a focused provider triage encounter, findings are as follows:    Brief history of present illness:  Seen [facility] [place]. States that she has had additional episodes of confusion and multiple falls since yesterday.     Vitals:    02/25/25 1512   BP: (!) 149/84   Pulse: 74   Resp: 20   Temp: 98.2 °F (36.8 °C)   TempSrc: Oral   SpO2: 100%   Weight: 64 kg (141 lb)   Height: 5' 5" (1.651 m)       Pertinent physical exam:  Ambulatory, alert and oriented    Brief workup plan:  ECG, labs    Preliminary workup initiated; this workup will be continued and followed by the physician or advanced practice provider that is assigned to the patient when roomed.  "

## 2025-02-25 NOTE — ED NOTES
Machelle Morin, a 64 y.o. female was seen at  for ams yesterday was d/c and told to follow up with neurology.    Triage note:  Chief Complaint   Patient presents with    Multiple complaints     Seen yest at  er for same thing, denies hitting head,  told to come see neurologist     Review of patient's allergies indicates:   Allergen Reactions    Sulfa (sulfonamide antibiotics) Itching    Cephalexin Nausea And Vomiting

## 2025-02-26 PROBLEM — I63.9 ISCHEMIC STROKE: Status: ACTIVE | Noted: 2025-02-26

## 2025-02-26 LAB
OHS QRS DURATION: 88 MS
OHS QTC CALCULATION: 438 MS

## 2025-02-26 NOTE — ED NOTES
Bed: [facility]  Expected date:   Expected time:   Means of arrival:   Comments:  Lena Bernal

## 2025-02-26 NOTE — SUBJECTIVE & OBJECTIVE
Past Medical History:   Diagnosis Date    Acute diastolic congestive heart failure 2024    Diabetes mellitus     Hypertension     Hypertensive emergency 2024     Past Surgical History:   Procedure Laterality Date     SECTION      ENDOMETRIAL ABLATION      OVARIAN CYST REMOVAL       Social History[1]  Review of patient's allergies indicates:   Allergen Reactions    Sulfa (sulfonamide antibiotics) Itching    Cephalexin Nausea And Vomiting       Medications: I have reviewed the current medication administration record.    Prescriptions Prior to Admission[2]    Review of Systems   Musculoskeletal:  Positive for gait problem.   Psychiatric/Behavioral:  Positive for confusion.      Objective:     Vital Signs (Most Recent):  Temp: 97.8 °F (36.6 °C) (25)  Pulse: 72 (25)  Resp: 17 (25)  BP: (!) 177/95 (25)  SpO2: 99 % (25)    Vital Signs Range (Last 24H):  Temp:  [97.5 °F (36.4 °C)-98.2 °F (36.8 °C)]   Pulse:  [66-80]   Resp:  [16-20]   BP: (149-177)/(80-97)   SpO2:  [99 %-100 %]        Physical Exam  Vitals and nursing note reviewed.   Constitutional:       General: She is not in acute distress.     Appearance: Normal appearance.   HENT:      Head: Normocephalic and atraumatic.      Mouth/Throat:      Mouth: Mucous membranes are moist.   Eyes:      General: No visual field deficit.     Extraocular Movements: Extraocular movements intact.   Pulmonary:      Effort: Pulmonary effort is normal. No respiratory distress.   Abdominal:      General: Abdomen is flat. There is no distension.   Musculoskeletal:         General: No swelling. Normal range of motion.      Cervical back: Normal range of motion.   Skin:     General: Skin is warm.   Neurological:      General: No focal deficit present.      Mental Status: She is alert. Mental status is at baseline.      GCS: GCS eye subscore is 4. GCS verbal subscore is 5. GCS motor subscore is 6.      Cranial  Nerves: Cranial nerves 2-12 are intact. No cranial nerve deficit, dysarthria or facial asymmetry.      Sensory: Sensation is intact. No sensory deficit.      Motor: Motor function is intact. No weakness.      Coordination: Coordination normal.   Psychiatric:         Mood and Affect: Mood normal.         Behavior: Behavior normal.         Laboratory:  CMP:   Recent Labs   Lab 02/25/25  1610   CALCIUM 8.7   ALBUMIN 3.1*   PROT 7.1      K 4.2   CO2 25      BUN 51*   CREATININE 1.9*   ALKPHOS 59   ALT 14   AST 25   BILITOT 0.3     CBC:   Recent Labs   Lab 02/25/25  1610   WBC 6.02   RBC 3.53*   HGB 10.6*   HCT 34.3*   *   MCV 97   MCH 30.0   MCHC 30.9*       Diagnostic Results:      Brain imaging:  Results for orders placed or performed during the hospital encounter of 02/25/25 (from the past 2160 hours)   MRI Brain Without Contrast    Impression    Lacunar-type infarct in the left centrum semiovale, likely acute to subacute.  No significant mass effect.    Remote microhemorrhages, with distribution suggestive of hypertensive microangiopathy.    Remote cerebellar and thalamic infarcts.  Chronic microvascular small vessel ischemic changes.    This report was flagged in Epic as abnormal.    Findings above were relayed by Juan Thompson MD via [employer] secure chat to Rufino BOSS with receipt confirmed on 02/25/2025 at 21:57.    Electronically signed by resident: Juan Thompson  Date:    02/25/2025  Time:    21:50    Electronically signed by: Kenny Hobbs MD  Date:    02/25/2025  Time:    22:21         Vessel Imaging:  Not completed at this time    Cardiac Evaluation:   Results for orders placed during the hospital encounter of 04/17/24    Echo    Interpretation Summary    Left Ventricle: The left ventricle is normal in size. Mildly increased wall thickness. There is mild concentric hypertrophy. There is normal systolic function with a visually estimated ejection fraction of 55 - 60%. Grade I diastolic  dysfunction.    Right Ventricle: Normal right ventricular cavity size. Systolic function is normal.    Pulmonary Artery: The estimated pulmonary artery systolic pressure is 44 mmHg.    IVC/SVC: Elevated venous pressure at 15 mmHg.    Pericardium: There is a moderate circumferential effusion. No indication of cardiac tamponade. Evidence includes no respiratory transvalvular variation.           [1]   Social History  Tobacco Use    Smoking status: Never     Passive exposure: Never    Smokeless tobacco: Never   Substance Use Topics    Alcohol use: Yes     Alcohol/week: 1.0 standard drink of alcohol     Types: 1 Glasses of wine per week     Comment: socially    Drug use: Never   [2] (Not in a hospital admission)

## 2025-02-26 NOTE — CONSULTS
[facility] - Emergency Dept  Vascular Neurology  [facility]  Consult Note    Consults  Assessment/Plan:     Patient is a 64 y.o. year old female with:    Ischemic stroke  Machelle Morin is a 64 F w/ PMH of dementia, CHF, DM2, HTN presenting to [facility] ED for falls and confusion. Patient initially presented yesterday to -ED for AMS- treated for HTN and sent home. Returned today for similar symptoms. MRI completed which shows w/ tiny acute infarct in the L centrum semiovale for which VN is consulted. NIHSS is 0. Patients symptoms of falls and confusion are not secondary to the stroke seen on MRI. Recommend outpatient follow up with VN for further evaluation and for evaluation of possible CAA seen on MRI.    Antithrombotics for secondary stroke prevention: Antiplatelets: Aspirin: 81 mg daily    Statins for secondary stroke prevention and hyperlipidemia, if present:   Statins: Atorvastatin- 40 mg daily    Aggressive risk factor modification: HTN, DM, HLD, Diet     Rehab efforts: The patient has been evaluated by a stroke team provider and the therapy needs have been fully considered based off the presenting complaints and exam findings. The following therapy evaluations are needed: None    Diagnostics ordered/pending: None           STROKE DOCUMENTATION          NIH Scale:  1a. Level of Consciousness: 0-->Alert, keenly responsive  1b. LOC Questions: 0-->Answers both questions correctly  1c. LOC Commands: 0-->Performs both tasks correctly  2. Best Gaze: 0-->Normal  3. Visual: 0-->No visual loss  4. Facial Palsy: 0-->Normal symmetrical movements  5a. Motor Arm, Left: 0-->No drift, limb holds 90 (or 45) degrees for full 10 secs  5b. Motor Arm, Right: 0-->No drift, limb holds 90 (or 45) degrees for full 10 secs  6a. Motor Leg, Left: 0-->No drift, leg holds 30 degree position for full 5 secs  6b. Motor Leg, Right: 0-->No drift, leg holds 30 degree position for full 5 secs  7. Limb Ataxia: 0-->Absent  8.  Sensory: 0-->Normal, no sensory loss  9. Best Language: 0-->No aphasia, normal  10. Dysarthria: 0-->Normal  11. Extinction and Inattention (formerly Neglect): 0-->No abnormality  Total (NIH Stroke Scale): 0    Modified Deeth Score: 3  Stendal Coma Scale:    ABCD2 Score:    [id number] Score:   HAS -BLED Score:   ICH Score:   Hunt & Oliveros Classification:       Thrombolysis Candidate? No, Non-disabling symptoms - Low NIHSS , no symptoms present    Delays to Thrombolysis?  Not Applicable    Interventional Revascularization Candidate?   Is the patient eligible for mechanical endovascular reperfusion (JT)?  No; No large vessel occlusion identified on imaging  and No; no significant neurologic deficit (NIHSS <6)     Delays to Thrombectomy? Not Applicable    Hemorrhagic change of an Ischemic Stroke: Does this patient have an ischemic stroke with hemorrhagic changes? No     Subjective:     History of Present Illness:  Machelle Morin is a 64 F w/ PMH of dementia, CHF, DM2, HTN presenting to [facility] ED for falls and confusion. Patient initially presented yesterday to -ED for AMS- treated for HTN and sent home. Returned today for similar symptoms. MRI completed which shows w/ tiny acute infarct in the L centrum semiovale for which VN is consulted.          Past Medical History:   Diagnosis Date    Acute diastolic congestive heart failure 2024    Diabetes mellitus     Hypertension     Hypertensive emergency 2024     Past Surgical History:   Procedure Laterality Date     SECTION      ENDOMETRIAL ABLATION      OVARIAN CYST REMOVAL       Social History[1]  Review of patient's allergies indicates:   Allergen Reactions    Sulfa (sulfonamide antibiotics) Itching    Cephalexin Nausea And Vomiting       Medications: I have reviewed the current medication administration record.    Prescriptions Prior to Admission[2]    Review of Systems   Musculoskeletal:  Positive for gait problem.   Psychiatric/Behavioral:   Positive for confusion.      Objective:     Vital Signs (Most Recent):  Temp: 97.8 °F (36.6 °C) (02/25/25 2257)  Pulse: 72 (02/25/25 2257)  Resp: 17 (02/25/25 2257)  BP: (!) 177/95 (02/25/25 2257)  SpO2: 99 % (02/25/25 2257)    Vital Signs Range (Last 24H):  Temp:  [97.5 °F (36.4 °C)-98.2 °F (36.8 °C)]   Pulse:  [66-80]   Resp:  [16-20]   BP: (149-177)/(80-97)   SpO2:  [99 %-100 %]        Physical Exam  Vitals and nursing note reviewed.   Constitutional:       General: She is not in acute distress.     Appearance: Normal appearance.   HENT:      Head: Normocephalic and atraumatic.      Mouth/Throat:      Mouth: Mucous membranes are moist.   Eyes:      General: No visual field deficit.     Extraocular Movements: Extraocular movements intact.   Pulmonary:      Effort: Pulmonary effort is normal. No respiratory distress.   Abdominal:      General: Abdomen is flat. There is no distension.   Musculoskeletal:         General: No swelling. Normal range of motion.      Cervical back: Normal range of motion.   Skin:     General: Skin is warm.   Neurological:      General: No focal deficit present.      Mental Status: She is alert. Mental status is at baseline.      GCS: GCS eye subscore is 4. GCS verbal subscore is 5. GCS motor subscore is 6.      Cranial Nerves: Cranial nerves 2-12 are intact. No cranial nerve deficit, dysarthria or facial asymmetry.      Sensory: Sensation is intact. No sensory deficit.      Motor: Motor function is intact. No weakness.      Coordination: Coordination normal.   Psychiatric:         Mood and Affect: Mood normal.         Behavior: Behavior normal.         Laboratory:  CMP:   Recent Labs   Lab 02/25/25  1610   CALCIUM 8.7   ALBUMIN 3.1*   PROT 7.1      K 4.2   CO2 25      BUN 51*   CREATININE 1.9*   ALKPHOS 59   ALT 14   AST 25   BILITOT 0.3     CBC:   Recent Labs   Lab 02/25/25  1610   WBC 6.02   RBC 3.53*   HGB 10.6*   HCT 34.3*   *   MCV 97   MCH 30.0   MCHC 30.9*        Diagnostic Results:      Brain imaging:  Results for orders placed or performed during the hospital encounter of 02/25/25 (from the past 2160 hours)   MRI Brain Without Contrast    Impression    Lacunar-type infarct in the left centrum semiovale, likely acute to subacute.  No significant mass effect.    Remote microhemorrhages, with distribution suggestive of hypertensive microangiopathy.    Remote cerebellar and thalamic infarcts.  Chronic microvascular small vessel ischemic changes.    This report was flagged in Epic as abnormal.    Findings above were relayed by Juan Thompson MD via [employer] secure chat to Rufino BOSS with receipt confirmed on 02/25/2025 at 21:57.    Electronically signed by resident: Juan Thompson  Date:    02/25/2025  Time:    21:50    Electronically signed by: Kneny Hobbs MD  Date:    02/25/2025  Time:    22:21         Vessel Imaging:  Not completed at this time    Cardiac Evaluation:   Results for orders placed during the hospital encounter of 04/17/24    Echo    Interpretation Summary    Left Ventricle: The left ventricle is normal in size. Mildly increased wall thickness. There is mild concentric hypertrophy. There is normal systolic function with a visually estimated ejection fraction of 55 - 60%. Grade I diastolic dysfunction.    Right Ventricle: Normal right ventricular cavity size. Systolic function is normal.    Pulmonary Artery: The estimated pulmonary artery systolic pressure is 44 mmHg.    IVC/SVC: Elevated venous pressure at 15 mmHg.    Pericardium: There is a moderate circumferential effusion. No indication of cardiac tamponade. Evidence includes no respiratory transvalvular variation.        Manfred Archer MD  [facility]  Department of Vascular Neurology   [facility] - Emergency Dept        [1]   Social History  Tobacco Use    Smoking status: Never     Passive exposure: Never    Smokeless tobacco: Never   Substance Use Topics    Alcohol use: Yes      Alcohol/week: 1.0 standard drink of alcohol     Types: 1 Glasses of wine per week     Comment: socially    Drug use: Never   [2] (Not in a hospital admission)

## 2025-02-26 NOTE — ASSESSMENT & PLAN NOTE
Machelle Morin is a 64 F w/ PMH of dementia, CHF, DM2, HTN presenting to [facility] ED for falls and confusion. Patient initially presented yesterday to -ED for AMS- treated for HTN and sent home. Returned today for similar symptoms. MRI completed which shows w/ tiny acute infarct in the L centrum semiovale for which VN is consulted. NIHSS is 0. Patients symptoms of falls and confusion are not secondary to the stroke seen on MRI. Recommend outpatient follow up with VN for further evaluation and for evaluation of possible CAA seen on MRI.    Antithrombotics for secondary stroke prevention: Antiplatelets: Aspirin: 81 mg daily    Statins for secondary stroke prevention and hyperlipidemia, if present:   Statins: Atorvastatin- 40 mg daily    Aggressive risk factor modification: HTN, DM, HLD, Diet     Rehab efforts: The patient has been evaluated by a stroke team provider and the therapy needs have been fully considered based off the presenting complaints and exam findings. The following therapy evaluations are needed: None    Diagnostics ordered/pending: None

## 2025-02-26 NOTE — ED PROVIDER NOTES
"Encounter Date: 2025       History     Chief Complaint   Patient presents with    Multiple complaints     Seen yest at  er for same thing, denies hitting head,  told to come see neurologist     The patient is a 64-year-old female. She has a past medical history of HTN, DM, and CHF. She is brought to the ER by her family for evaluation. Family reports that since Thanksgiving, they have noticed changes in patient's mentation and behavior. Family reports concern for gradual cognitive and memory decline. Seen by PCP for this and early dementia suspected. However, family notes symptoms seem to be getting worse, now having "blank spells" where she is awake but not responsive momentarily. They report that she has been having intermittent episodes of dizziness and falls. She was seen for this at an outside ER yesterday and had labs and head CT done without any acute abnormality, and was referred to outpatient neurology and discharged. Family states that after discharge, she fell in the bathroom last night and again in the kitchen this morning. Patient does report episodes of suddenly feeling weak and dizzy causing falls. She denies any HA, confusion, CP/SOB, palpitations, vision change, numbness or weakness. She denies any pain or injuries from falls. Patient stating that she does not think she needs to be in the ER and that she "feels fine".      Review of patient's allergies indicates:   Allergen Reactions    Sulfa (sulfonamide antibiotics) Itching    Cephalexin Nausea And Vomiting     Past Medical History:   Diagnosis Date    Acute diastolic congestive heart failure 2024    Diabetes mellitus     Hypertension     Hypertensive emergency 2024     Past Surgical History:   Procedure Laterality Date     SECTION      ENDOMETRIAL ABLATION      OVARIAN CYST REMOVAL       Family History   Problem Relation Name Age of Onset    Alcohol abuse Father      Miscarriages / Stillbirths Mother      Hypertension " Mother      Cancer Mother      Arthritis Mother      Pancreatic cancer Mother  83    Early death Brother      Early death Brother      Miscarriages / Stillbirths Sister      Asthma Sister      Asthma Daughter      Colon cancer Neg Hx      Diabetes Neg Hx      Stroke Neg Hx       Social History[1]  Review of Systems   Constitutional:  Negative for fever.   HENT:  Negative for trouble swallowing.    Eyes:  Negative for visual disturbance.   Respiratory:  Negative for cough and shortness of breath.    Cardiovascular:  Negative for chest pain, palpitations and leg swelling.   Gastrointestinal:  Negative for abdominal pain, diarrhea, nausea and vomiting.   Genitourinary:  Negative for decreased urine volume, flank pain and pelvic pain.   Musculoskeletal:  Negative for arthralgias, back pain and neck pain.   Skin:  Negative for color change, rash and wound.   Neurological:  Positive for dizziness, syncope and light-headedness.   Psychiatric/Behavioral:  Negative for agitation.        Physical Exam     Initial Vitals [02/25/25 1512]   BP Pulse Resp Temp SpO2   (!) 149/84 74 20 98.2 °F (36.8 °C) 100 %      MAP       --         Physical Exam    Nursing note and vitals reviewed.  Constitutional: She appears well-developed and well-nourished. She is not diaphoretic. No distress.   HENT:   Head: Normocephalic and atraumatic.   Eyes: Conjunctivae are normal.   Neck: Neck supple.   Cardiovascular:  Normal rate.           Pulmonary/Chest: No respiratory distress.   Abdominal: She exhibits no distension. There is no abdominal tenderness.   Musculoskeletal:         General: No tenderness. Normal range of motion.      Cervical back: Neck supple.     Neurological: She is alert and oriented to person, place, and time. She has normal strength. No sensory deficit.   Skin: Skin is warm and dry.   Psychiatric: She has a normal mood and affect. Her behavior is normal.         ED Course   Procedures  Labs Reviewed   CBC W/ AUTO DIFFERENTIAL  - Abnormal       Result Value    WBC 6.02      RBC 3.53 (*)     Hemoglobin 10.6 (*)     Hematocrit 34.3 (*)     MCV 97      MCH 30.0      MCHC 30.9 (*)     RDW 13.2      Platelets 144 (*)     MPV 11.7      Immature Granulocytes 0.3      Gran # (ANC) 3.8      Immature Grans (Abs) 0.02      Lymph # 1.8      Mono # 0.3      Eos # 0.1      Baso # 0.02      nRBC 0      Gran % 62.8      Lymph % 29.4      Mono % 5.0      Eosinophil % 2.2      Basophil % 0.3      Differential Method Automated      Narrative:     Release to patient->Immediate   COMPREHENSIVE METABOLIC PANEL - Abnormal    Sodium 138      Potassium 4.2      Chloride 105      CO2 25      Glucose 122 (*)     BUN 51 (*)     Creatinine 1.9 (*)     Calcium 8.7      Total Protein 7.1      Albumin 3.1 (*)     Total Bilirubin 0.3      Alkaline Phosphatase 59      AST 25      ALT 14      eGFR 28.9 (*)     Anion Gap 8      Narrative:     Release to patient->Immediate   URINALYSIS, REFLEX TO URINE CULTURE - Abnormal    Specimen UA Urine, Clean Catch      Color, UA Yellow      Appearance, UA Clear      pH, UA 6.0      Specific Gravity, UA 1.015      Protein, UA 2+ (*)     Glucose, UA Negative      Ketones, UA Negative      Bilirubin (UA) Negative      Occult Blood UA 2+ (*)     Nitrite, UA Negative      Leukocytes, UA 3+ (*)     Narrative:     Specimen Source->Urine   URINALYSIS MICROSCOPIC - Abnormal    RBC, UA 10 (*)     WBC, UA 50 (*)     Bacteria Many (*)     Squam Epithel, UA 2      Non-Squam Epith 3 (*)     Hyaline Casts, UA 7 (*)     Microscopic Comment SEE COMMENT      Narrative:     Specimen Source->Urine   CK - Abnormal     (*)     Narrative:     Release to patient->Immediate    add on HS Troponin-[id number] per Nathaniel Haji III, MD   02/25/2025  18:03 NORMA.Nick   CULTURE, URINE   HEPATITIS C ANTIBODY    Hepatitis C Ab Non-reactive      Narrative:     Release to patient->Immediate   HIV 1 / 2 ANTIBODY    HIV 1/2 Ag/Ab Non-reactive       Narrative:     Release to patient->Immediate   MAGNESIUM    Magnesium 2.1      Narrative:     Release to patient->Immediate   PHOSPHORUS    Phosphorus 4.3      Narrative:     Release to patient->Immediate   TROPONIN I HIGH SENSITIVITY   CK   DRUG SCREEN PANEL, URINE EMERGENCY   TROPONIN I HIGH SENSITIVITY    Troponin I High Sensitivity 11      Narrative:     Release to patient->Immediate    add on HS Troponin-[id number] per Nathaniel ePrez III, MD   02/25/2025  18:03 [place]   DRUG SCREEN PANEL, URINE EMERGENCY    Benzodiazepines Negative      Methadone metabolites Negative      Cocaine (Metab.) Negative      Opiate Scrn, Ur Negative      Barbiturate Screen, Ur Negative      Amphetamine Screen, Ur Negative      THC Negative      Phencyclidine Negative      Creatinine, Urine 87.0      Toxicology Information SEE COMMENT      Narrative:     ADD ON UDRUG PER CHENTE CUEVAS RN; NATHANIEL PEREZ MD;   #[id number]   02/25/2025  18:10         ECG Results              EKG 12-lead (Final result)        Collection Time Result Time QRS Duration [facility] QTC Calculation    02/25/25 17:19:00 02/26/25 08:07:47 88 438                     Final result by Interface, Lab In [facility] (02/26/25 08:07:49)                   Narrative:    Test Reason : W19.XXXA,    Vent. Rate :  66 BPM     Atrial Rate :  66 BPM     P-R Int : 172 ms          QRS Dur :  88 ms      QT Int : 418 ms       P-R-T Axes :  30   1  43 degrees    QTcB Int : 438 ms    Normal sinus rhythm  Abnormal R wave progression  Cannot rule out Anterior infarct ,age undetermined  Abnormal ECG  When compared with ECG of 24-Feb-2025 07:37,  ME interval has decreased  Repeat ECG with appropriate lead placement if clinically indicated  Confirmed by Surendra Owens ([id number]) on 2/26/2025 8:07:42 AM    Referred By: AAAREFERRAL SELF           Confirmed By: Surendra Owens                                  Imaging Results               MRI Brain Without Contrast (Final result)   Result time 02/25/25 22:21:55      Final result by Kenny Hobbs MD (02/25/25 22:21:55)                   Impression:      Lacunar-type infarct in the left centrum semiovale, likely acute to subacute.  No significant mass effect.    Remote microhemorrhages, with distribution suggestive of hypertensive microangiopathy.    Remote cerebellar and thalamic infarcts.  Chronic microvascular small vessel ischemic changes.    This report was flagged in Epic as abnormal.    Findings above were relayed by Juan Thompson MD via [facility] secure chat to Rufino BOSS with receipt confirmed on 02/25/2025 at 21:57.    Electronically signed by resident: Juan Thompson  Date:    02/25/2025  Time:    21:50    Electronically signed by: Kenny Hobbs MD  Date:    02/25/2025  Time:    22:21               Narrative:    EXAMINATION:  MRI BRAIN WITHOUT CONTRAST    CLINICAL HISTORY:  Syncope, recurrent;Mental status change, unknown cause;    TECHNIQUE:  Multiplanar multisequence MR imaging of the brain was performed without the administration of intravenous contrast.    COMPARISON:  CT head 02/24/2025.    FINDINGS:  Punctate focus of diffusion restriction in the left centrum semiovale.  Questionable minimal associated T2 FLAIR signal abnormality.    Innumerable punctate foci of susceptibility artifact, predominantly central distribution, suggestive of hypertensive microangiopathy.    Prominence of the ventricles and sulci compatible with cerebral volume loss.  No hydrocephalus.    Remote cerebellar and thalamic infarcts.  Superimposed patchy T2/FLAIR hyperintensity in the supratentorial white matter and tracee, nonspecific but most likely reflecting chronic microvascular ischemic changes. No acute major vascular distribution infarct. No acute hemorrhage.  No mass effect or midline shift.    No extra-axial blood or fluid collections.    The T2 skull base flow voids are preserved.  Bone marrow signal intensity unremarkable.  Bilateral lens  "implants.    Trace bilateral mastoid fluid.  Mild patchy mucosal thickening in the paranasal sinuses.                                       X-Ray Chest AP Portable (Final result)  Result time 02/25/25 18:28:07      Final result by Rufino Pantoja MD (02/25/25 18:28:07)                   Impression:      1. Mild left basilar subsegmental atelectasis, no large focal consolidation.      Electronically signed by: Rufino Pantoja MD  Date:    02/25/2025  Time:    18:28               Narrative:    EXAMINATION:  XR CHEST AP PORTABLE    CLINICAL HISTORY:  Syncope and collapse    TECHNIQUE:  Single frontal view of the chest was performed.    COMPARISON:  04/17/2024    FINDINGS:  The cardiomediastinal silhouette is prominent, similar to the previous exam noting calcification of the aorta..  There is no pleural effusion.  The trachea is midline.  The lungs are symmetrically expanded bilaterally without evidence of acute parenchymal process. No large focal consolidation seen.  There is left basilar subsegmental atelectasis.  There is no pneumothorax.  The osseous structures are remarkable for degenerative change noting levo scoliotic curvature..                                       Medications   cefTRIAXone injection 1 g (1 g Intravenous Given 2/25/25 2251)     Medical Decision Making  Amount and/or Complexity of Data Reviewed  Labs: ordered.  Radiology: ordered.    Risk  OTC drugs.  Prescription drug management.                          Medical Decision Making:   History:   I obtained history from: someone other than patient.       <> Summary of History: History provided by patient and her family.   Old Medical Records: I decided to obtain old medical records.  Initial Assessment:   65 yo F, 2nd ED visit in 2 days for concern of cognitive decline for months, recently having "blank spells" and falling   Differential Diagnosis:   Dementia, Encephalopathy, CVA, medication, electrolyte derangement, etc   Clinical Tests:   Lab " Tests: Ordered and Reviewed  Radiological Study: Ordered  ED Management:  Vital signs reviewed   Chart review completed - ED note and results from ED visit yesterday reviewed   MRI brain ordered to evaluate for possible CVA   I gave report and signed out patient to PM FLORENCE who will assume all further care and management of patient due to pending MRI and end of my shift              Clinical Impression:  Final diagnoses:  [W19.XXXA] Fall  [R55] Syncope  [N39.0] Urinary tract infection without hematuria, site unspecified (Primary)  [I63.9] Cerebrovascular accident (CVA), unspecified mechanism          ED Disposition Condition    Discharge Stable          ED Prescriptions       Medication Sig Dispense Start Date End Date Auth. Provider    ciprofloxacin HCl (CIPRO) 250 MG tablet Take 1 tablet (250 mg total) by mouth 2 (two) times daily. for 7 days 14 tablet 2/25/2025 3/4/2025 Rufino Stevenson PA-C    aspirin (ECOTRIN) 81 MG EC tablet Take 1 tablet (81 mg total) by mouth once daily. 30 tablet 2/25/2025 2/25/2026 Rufino Stevenson PA-C          Follow-up Information    None              [1]   Social History  Tobacco Use    Smoking status: Never     Passive exposure: Never    Smokeless tobacco: Never   Substance Use Topics    Alcohol use: Yes     Alcohol/week: 1.0 standard drink of alcohol     Types: 1 Glasses of wine per week     Comment: socially    Drug use: Never        Chaparro Cruz PA-C  02/26/25 2024

## 2025-02-26 NOTE — ED PROVIDER NOTES
I assumed care of this patient from my colleague at shift change.  At that time patient was pending imaging studies.  Imaging studies revealed microvascular ischemic changes likely subacute stroke.  No acute intervention at this time.  Stroke team consulted and recommendations appreciated.  We will start patient on baby aspirin once daily.  Urinalysis also consistent with a possible UTI, antibiotics started in the ED. based on recent decline of renal function will prescribed Cipro 250 b.i.d..     Strongly encouraged follow-up with primary care regarding ongoing neurological changes and recent declining kidney function.  Return precautions were given.  Patient is stable for discharge     Rufino Stevenson PA-C  02/25/25 [id number]

## 2025-02-26 NOTE — ED TRIAGE NOTES
"Machelle Morin, a 64 y.o. female presents to the ED following 2 falls, one reported last night and one reported this morning. Patient denies hitting her head both times, but does not remember the incident. Patient unsure of LOC. Denies any head or body pain. 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 Patient states she has vision changes, but none new in the past year. Patient Aox4, slightly unsteady gait.    Triage note:  Chief Complaint   Patient presents with    Multiple complaints     [facility] at [facility] for same thing, denies hitting head,  told to come see neurologist     Review of patient's allergies indicates:   Allergen Reactions    Sulfa (sulfonamide antibiotics) Itching    Cephalexin Nausea And Vomiting     Past Medical History:   Diagnosis Date    Acute diastolic congestive heart failure 04/17/2024    Diabetes mellitus     Hypertension     Hypertensive emergency 04/17/2024      "

## 2025-02-26 NOTE — HPI
Machelle Morin is a 64 F w/ PMH of dementia, CHF, DM2, HTN [facility] for falls and confusion. Patient initially presented yesterday to -ED for AMS- treated for HTN and sent home. Returned today for similar symptoms. MRI completed which shows w/ tiny acute infarct in the L centrum semiovale for which VN is consulted.

## 2025-02-26 NOTE — ED NOTES
Pt care assumed. Report received by COLE Reynolds. Pt lying in stretcher in low and locked position and side rails raised x2. Pt in NAD and verbalized no needs at this time.

## 2025-02-26 NOTE — DISCHARGE INSTRUCTIONS
Take antibiotics until complete   Follow-up with the vascular neurologist   Return to the ED as needed  Start taking baby aspirin 81 mg once a day      Thank you for coming to our Emergency Department today. It is important to remember that some problems are difficult to diagnose and may not be found during your Emergency Department visit. Be sure to follow up with your primary care doctor and review all labs/imaging/tests that were performed during this visit with them. Some labs/tests may be outside of the normal range and require non-emergent follow-up and further investigation to help diagnose/exclude/prevent complications or other medical conditions.    If you do not have a primary care doctor, you may contact the one listed on your discharge paperwork or you may also call the [facility] Appointment Desk at [phone] to schedule an appointment and establish care with one. It is important to your health that you have a primary care doctor.    Please take all medications as directed. All medications may potentially have side-effects and it is impossible to predict which medications may give you side-effects or what side-effects (if any) they will give you.. If you feel that you are having a negative effect or side-effect of any medication you should immediately stop taking them and seek medical attention. If you feel that you are having a life-threatening reaction call 911.    Return to the ER with any questions/concerns, new/concerning symptoms, worsening or failure to improve.     Do not drive, swim, climb to height, take a bath or make any important decisions for 24 hours if you have received any pain medications, sedatives or mood altering drugs during your ER visit.

## 2025-02-27 LAB
BACTERIA UR CULT: NORMAL
BACTERIA UR CULT: NORMAL

## 2025-03-17 RX ORDER — SERTRALINE HYDROCHLORIDE 50 MG/1
50 TABLET, FILM COATED ORAL DAILY
Qty: 90 TABLET | Refills: 3 | Status: SHIPPED | OUTPATIENT
Start: 2025-03-17 | End: 2026-03-17

## 2025-03-17 NOTE — TELEPHONE ENCOUNTER
No care due was identified.  [facility] Embedded Care Due Messages. Reference number: [id number].   3/17/2025 11:28:43 AM CDT

## 2025-03-19 ENCOUNTER — OFFICE VISIT (OUTPATIENT)
Dept: FAMILY MEDICINE | Facility: CLINIC | Age: 65
End: 2025-03-19
Payer: MEDICARE

## 2025-03-19 ENCOUNTER — PATIENT OUTREACH (OUTPATIENT)
Dept: ADMINISTRATIVE | Facility: HOSPITAL | Age: 65
End: 2025-03-19
Payer: MEDICARE

## 2025-03-19 VITALS
BODY MASS INDEX: 25.27 KG/M2 | SYSTOLIC BLOOD PRESSURE: 100 MMHG | DIASTOLIC BLOOD PRESSURE: 60 MMHG | WEIGHT: 151.69 LBS | TEMPERATURE: 98 F | OXYGEN SATURATION: 98 % | HEIGHT: 65 IN | RESPIRATION RATE: 18 BRPM | HEART RATE: 72 BPM

## 2025-03-19 DIAGNOSIS — E11.65 TYPE 2 DIABETES MELLITUS WITH HYPERGLYCEMIA, WITHOUT LONG-TERM CURRENT USE OF INSULIN: ICD-10-CM

## 2025-03-19 DIAGNOSIS — G31.84 MILD COGNITIVE IMPAIRMENT: Primary | ICD-10-CM

## 2025-03-19 DIAGNOSIS — F33.1 MODERATE EPISODE OF RECURRENT MAJOR DEPRESSIVE DISORDER: ICD-10-CM

## 2025-03-19 DIAGNOSIS — N17.9 AKI (ACUTE KIDNEY INJURY): ICD-10-CM

## 2025-03-19 DIAGNOSIS — R63.0 LOSS OF APPETITE: ICD-10-CM

## 2025-03-19 PROCEDURE — 3074F SYST BP LT 130 MM HG: CPT | Mod: CPTII,S$GLB,, | Performed by: INTERNAL MEDICINE

## 2025-03-19 PROCEDURE — 1101F PT FALLS ASSESS-DOCD LE1/YR: CPT | Mod: CPTII,S$GLB,, | Performed by: INTERNAL MEDICINE

## 2025-03-19 PROCEDURE — 99999 PR PBB SHADOW E&M-EST. PATIENT-LVL IV: CPT | Mod: PBBFAC,,, | Performed by: INTERNAL MEDICINE

## 2025-03-19 PROCEDURE — 4010F ACE/ARB THERAPY RXD/TAKEN: CPT | Mod: CPTII,S$GLB,, | Performed by: INTERNAL MEDICINE

## 2025-03-19 PROCEDURE — 99215 OFFICE O/P EST HI 40 MIN: CPT | Mod: S$GLB,,, | Performed by: INTERNAL MEDICINE

## 2025-03-19 PROCEDURE — 3008F BODY MASS INDEX DOCD: CPT | Mod: CPTII,S$GLB,, | Performed by: INTERNAL MEDICINE

## 2025-03-19 PROCEDURE — 1126F AMNT PAIN NOTED NONE PRSNT: CPT | Mod: CPTII,S$GLB,, | Performed by: INTERNAL MEDICINE

## 2025-03-19 PROCEDURE — 1160F RVW MEDS BY RX/DR IN RCRD: CPT | Mod: CPTII,S$GLB,, | Performed by: INTERNAL MEDICINE

## 2025-03-19 PROCEDURE — 3078F DIAST BP <80 MM HG: CPT | Mod: CPTII,S$GLB,, | Performed by: INTERNAL MEDICINE

## 2025-03-19 PROCEDURE — 3288F FALL RISK ASSESSMENT DOCD: CPT | Mod: CPTII,S$GLB,, | Performed by: INTERNAL MEDICINE

## 2025-03-19 PROCEDURE — 1159F MED LIST DOCD IN RCRD: CPT | Mod: CPTII,S$GLB,, | Performed by: INTERNAL MEDICINE

## 2025-03-19 RX ORDER — DULAGLUTIDE 0.75 MG/.5ML
0.75 INJECTION, SOLUTION SUBCUTANEOUS
COMMUNITY
Start: 2025-02-27

## 2025-03-19 NOTE — PROGRESS NOTES
Chief Complaint: Follow-up (1 month follow up)      Machelle Morin  is a 64 y.o. year old patient who presents today for MOCA    Is here alone today  Completed MOCA  Patient started tearing up about the stress with her step sons and their children  Said she does not have time to read    Past Medical History:   Diagnosis Date    Acute diastolic congestive heart failure 2024    Diabetes mellitus     Hypertension     Hypertensive emergency 2024       Past Surgical History:   Procedure Laterality Date     SECTION      ENDOMETRIAL ABLATION      OVARIAN CYST REMOVAL          Family History   Problem Relation Name Age of Onset    Alcohol abuse Father      Miscarriages / Stillbirths Mother      Hypertension Mother      Cancer Mother      Arthritis Mother      Pancreatic cancer Mother  83    Early death Brother      Early death Brother      Miscarriages / Stillbirths Sister      Asthma Sister      Asthma Daughter      Colon cancer Neg Hx      Diabetes Neg Hx      Stroke Neg Hx          Social History     Socioeconomic History    Marital status:     Number of children: 1   Tobacco Use    Smoking status: Never     Passive exposure: Never    Smokeless tobacco: Never   Substance and Sexual Activity    Alcohol use: Yes     Alcohol/week: 1.0 standard drink of alcohol     Types: 1 Glasses of wine per week     Comment: socially    Drug use: Never    Sexual activity: Not Currently     Partners: Male     Comment:  since  after 38 years marriage     Social Drivers of Health     Financial Resource Strain: Low Risk  (2025)    Overall Financial Resource Strain (CARDIA)     Difficulty of Paying Living Expenses: Not hard at all   Food Insecurity: No Food Insecurity (2025)    Hunger Vital Sign     Worried About Running Out of Food in the Last Year: Never true     Ran Out of Food in the Last Year: Never true   Transportation Needs: No Transportation Needs (2023)    PRAPARE -  Transportation     Lack of Transportation (Medical): No     Lack of Transportation (Non-Medical): No   Physical Activity: Inactive (1/28/2025)    Exercise Vital Sign     Days of Exercise per Week: 0 days     Minutes of Exercise per Session: 0 min   Stress: Stress Concern Present (1/28/2025)    [employer] - Occupational Stress Questionnaire     Feeling of Stress : Very much   Housing Stability: Unknown (1/28/2025)    Housing Stability Vital Sign     Unable to Pay for Housing in the Last Year: No       Current Medications[1]           Objective:      Vitals:    03/19/25 0744   BP: 100/60   Pulse: 72   Resp: 18   Temp: 98.1 °F (36.7 °C)       Physical Exam  Constitutional:       Appearance: Normal appearance.   HENT:      Head: Normocephalic and atraumatic.   Skin:     General: Skin is warm and dry.   Neurological:      General: No focal deficit present.      Mental Status: She is alert and oriented to person, place, and time.   Psychiatric:      Comments: tearful          Assessment:       1. Mild cognitive impairment    2. Type 2 diabetes mellitus with hyperglycemia, without long-term current use of insulin    3. KISHAN (acute kidney injury)    4. Moderate episode of recurrent major depressive disorder    5. Loss of appetite          Plan:   1. Mild cognitive impairment  Assessment & Plan:  Reports she can complete ADLs and iADLs  MOCA 24-25/30   - has upcoming appt with neurology   - did discuss lifestyle changes to help slow down progression      2. Type 2 diabetes mellitus with hyperglycemia, without long-term current use of insulin  Assessment & Plan:  Controlled   Lab Results   Component Value Date    HGBA1C 5.6 11/15/2024     - cont trulicity    Orders:  -     Diabetic Eye Screening Photo; Future  -     CBC Auto Differential; Future; Expected date: 03/19/2025  -     Hemoglobin A1C; Future; Expected date: 03/19/2025    3. KISHAN (acute kidney injury)  -     Comprehensive Metabolic Panel;  Future; Expected date: 03/19/2025    4. Moderate episode of recurrent major depressive disorder  Assessment & Plan:  Chronic, unresolved. Patient has drop in appetite   Still dealing with family issues.     - cont zoloft and remeron      5. Loss of appetite  Assessment & Plan:  Had weight gain since last visit  - cont remeron       Total 40 mins spent on patient with more than 50% spent counseling and MOCA      Follow up in about 3 months (around 6/19/2025) for labs prior .                [1]   Current Outpatient Medications:     amlodipine-olmesartan (AMBER) 10-40 mg per tablet, Take 1 tablet by mouth once daily., Disp: 90 tablet, Rfl: 3    aspirin (ECOTRIN) 81 MG EC tablet, Take 1 tablet (81 mg total) by mouth once daily., Disp: 30 tablet, Rfl: 2    atorvastatin (LIPITOR) 40 MG tablet, Take 1 tablet (40 mg total) by mouth once daily., Disp: 90 tablet, Rfl: 3    carvediloL (COREG) 25 MG tablet, Take 1 tablet (25 mg total) by mouth 2 (two) times daily with meals., Disp: 180 tablet, Rfl: 3    mirtazapine (REMERON) 7.5 MG Tab, Take 1 tablet (7.5 mg total) by mouth every evening., Disp: 90 tablet, Rfl: 0    sertraline (ZOLOFT) 50 MG tablet, Take 1 tablet (50 mg total) by mouth once daily., Disp: 90 tablet, Rfl: 3    TRULICITY 0.75 mg/0.5 mL pen injector, Inject 0.75 mg into the skin every 7 days., Disp: , Rfl:     zolpidem (AMBIEN) 10 mg Tab, Take 1 tablet (10 mg total) by mouth nightly as needed (insomnia). Ambien 10 mg tablet  Take 1 tablet every day by oral route., Disp: 30 tablet, Rfl: 0

## 2025-03-19 NOTE — PROGRESS NOTES
Population Health Chart Review & Patient Outreach Details      Additional [facility] Health Notes:    **PLEASE RECHECK BLOOD PRESSURE OR SCHEDULE NURSE VISIT IF ELEVATED DURING VISIT.     HM and immunization's reviewed and updated.  DUE FOR COLON SCREENING, DEXA SCAN, AND MAMMOGRAM. Order in for cscope, need to schedule PAT. Patient can also schedule to call endo department at [phone].   Please remind to mail back cologuard if need replacement need to call [phone].   Dexa and mammogram scheduled for 4/1/2025.           Updates Requested / Reviewed:      Updated Care Coordination Note and Care Everywhere         Health Maintenance Topics Overdue:      VBHM Score: 2     Colon Cancer Screening  Foot Exam    RSV Vaccine

## 2025-03-19 NOTE — PROGRESS NOTES
Health Maintenance Due   Topic     DEXA Scan      Colorectal Cancer Screening  Consult PCP    RSV Vaccine (Age 60+ and Pregnant patients) (1 - Risk 60-74 years 1-dose series) Not offered at this facility    Mammogram      Foot Exam  Consult PCP    Diabetic Eye Exam

## 2025-03-19 NOTE — ASSESSMENT & PLAN NOTE
Reports she can complete ADLs and iADLs  MOCA 24-25/30   - has upcoming appt with neurology   - did discuss lifestyle changes to help slow down progression

## 2025-03-19 NOTE — ASSESSMENT & PLAN NOTE
Chronic, unresolved. Patient has drop in appetite   Still dealing with family issues.     - cont zoloft and remeron

## 2025-03-24 DIAGNOSIS — Z00.00 ENCOUNTER FOR MEDICARE ANNUAL WELLNESS EXAM: ICD-10-CM

## 2025-04-04 ENCOUNTER — TELEPHONE (OUTPATIENT)
Dept: FAMILY MEDICINE | Facility: CLINIC | Age: 65
End: 2025-04-04
Payer: MEDICARE

## 2025-04-11 ENCOUNTER — TELEPHONE (OUTPATIENT)
Dept: NEUROLOGY | Facility: CLINIC | Age: 65
End: 2025-04-11
Payer: MEDICARE

## 2025-05-14 ENCOUNTER — TELEPHONE (OUTPATIENT)
Dept: NEUROLOGY | Facility: CLINIC | Age: 65
End: 2025-05-14

## 2025-05-19 ENCOUNTER — TELEPHONE (OUTPATIENT)
Dept: FAMILY MEDICINE | Facility: CLINIC | Age: 65
End: 2025-05-19
Payer: MEDICARE

## 2025-05-19 NOTE — TELEPHONE ENCOUNTER
----- Message from Med Assistant Aldridge sent at 5/19/2025  7:33 AM CDT -----  Type: Sooner AppointmentPatient is requesting a sooner appointment. Patient declined first available appointment listed as well as another facility and provider. Patient will not accept being placed on the wait list and is requesting a message be sent to the doctor. Name of caller: Zully sister of LINDA, KOLTON AVILEZ [[id number]]When is the first available appointment? 8/19/25Symptoms: f/u from a stroke about a week agoWould the patient rather a call back or response via [facility]? [facility]: [phone]

## 2025-05-19 NOTE — TELEPHONE ENCOUNTER
Spoke to pts sister and scheduled hosp f/u with renee; sister states she was admitted at hospital in [place]

## 2025-05-23 ENCOUNTER — OFFICE VISIT (OUTPATIENT)
Dept: FAMILY MEDICINE | Facility: CLINIC | Age: 65
End: 2025-05-23
Payer: MEDICARE

## 2025-05-23 VITALS
DIASTOLIC BLOOD PRESSURE: 40 MMHG | SYSTOLIC BLOOD PRESSURE: 82 MMHG | HEIGHT: 65 IN | WEIGHT: 145.5 LBS | RESPIRATION RATE: 16 BRPM | HEART RATE: 73 BPM | OXYGEN SATURATION: 97 % | TEMPERATURE: 100 F | BODY MASS INDEX: 24.24 KG/M2

## 2025-05-23 DIAGNOSIS — R63.0 LOSS OF APPETITE: ICD-10-CM

## 2025-05-23 DIAGNOSIS — Z09 FOLLOW-UP EXAM: Primary | ICD-10-CM

## 2025-05-23 DIAGNOSIS — E11.65 TYPE 2 DIABETES MELLITUS WITH HYPERGLYCEMIA, WITHOUT LONG-TERM CURRENT USE OF INSULIN: ICD-10-CM

## 2025-05-23 DIAGNOSIS — Z92.89 HISTORY OF BONE DENSITY STUDY: ICD-10-CM

## 2025-05-23 DIAGNOSIS — I63.9 ISCHEMIC STROKE: ICD-10-CM

## 2025-05-23 DIAGNOSIS — I10 BENIGN ESSENTIAL HTN: ICD-10-CM

## 2025-05-23 DIAGNOSIS — Z12.31 ENCOUNTER FOR SCREENING MAMMOGRAM FOR BREAST CANCER: ICD-10-CM

## 2025-05-23 DIAGNOSIS — M81.0 AGE-RELATED OSTEOPOROSIS WITHOUT CURRENT PATHOLOGICAL FRACTURE: ICD-10-CM

## 2025-05-23 DIAGNOSIS — E78.49 OTHER HYPERLIPIDEMIA: ICD-10-CM

## 2025-05-23 DIAGNOSIS — G47.09 OTHER INSOMNIA: ICD-10-CM

## 2025-05-23 PROCEDURE — 99999 PR PBB SHADOW E&M-EST. PATIENT-LVL IV: CPT | Mod: PBBFAC,,,

## 2025-05-23 RX ORDER — CYPROHEPTADINE HYDROCHLORIDE 4 MG/1
4 TABLET ORAL 3 TIMES DAILY PRN
Qty: 30 TABLET | Refills: 0 | Status: CANCELLED | OUTPATIENT
Start: 2025-05-23

## 2025-05-23 RX ORDER — ZOLPIDEM TARTRATE 10 MG/1
10 TABLET ORAL NIGHTLY PRN
Qty: 30 TABLET | Refills: 0 | Status: CANCELLED | OUTPATIENT
Start: 2025-05-23

## 2025-05-23 RX ORDER — ZOLPIDEM TARTRATE 10 MG/1
10 TABLET ORAL NIGHTLY PRN
Qty: 30 TABLET | Refills: 0 | Status: SHIPPED | OUTPATIENT
Start: 2025-05-23

## 2025-05-23 RX ORDER — CYPROHEPTADINE HYDROCHLORIDE 4 MG/1
4 TABLET ORAL 3 TIMES DAILY PRN
Qty: 30 TABLET | Refills: 0 | Status: SHIPPED | OUTPATIENT
Start: 2025-05-23

## 2025-05-23 NOTE — PROGRESS NOTES
HPI     Chief Complaint:  Chief Complaint   Patient presents with    Follow-up    Fall     Had a few falls    Dizziness    Anorexia       Machelle Morin is a 65 y.o. female with multiple medical diagnoses as listed in the medical history and problem list that presents for hospital follow up post stroke    HPI    History of Present Illness    CHIEF COMPLAINT:  Machelle presents today for follow up after experiencing a stroke in February.    HISTORY OF PRESENT ILLNESS:  She initially became lethargic and stopped breathing in February, requiring ambulance transport to [facility] where a CT was performed. After discharge, she experienced another episode the following day and was brought to [facility] where CT showed evidence of a stroke. She currently experiences shortness of breath with activity, becoming winded after walking short distances. She reports balance and mobility issues, feeling unsteady and wobbly. She has fallen when getting into bed, describing that she sits on the edge and slides off onto the floor. She also reports weight loss.    SLEEP:  She reports staying up all night watching television. She takes Ambien for sleep management.    MEDICATIONS:  She takes a combination blood pressure medication twice daily (morning and bedtime), Lipitor, and Trulicity.    SOCIAL HISTORY:  She is a retired  of 36 years who lives with her twin sister.      ROS:  General: -fever, -chills, -fatigue, -weight gain, +weight loss  Eyes: -vision changes, -redness, -discharge  ENT: -ear pain, -nasal congestion, -sore throat  Cardiovascular: -chest pain, -palpitations, -lower extremity edema  Respiratory: -cough, +shortness of breath, +exertional dyspnea  Gastrointestinal: -abdominal pain, -nausea, -vomiting, -diarrhea, -constipation, -blood in stool  Genitourinary: -dysuria, -hematuria, -frequency  Musculoskeletal: -joint pain, -muscle pain  Skin: -rash, -lesion  Neurological:  -headache, -dizziness, -numbness, -tingling, +abnormal gait, +falling  Psychiatric: -anxiety, -depression, -sleep difficulty             Assessment & Plan     Assessment & Plan    Assessed post-stroke status and ongoing symptoms including unsteadiness, weakness, and fatigue.  Evaluated current BP management, noting recent resolution of edema with current regimen.  Considered need for appetite stimulation and weight management given significant weight loss.  Reviewed fall prevention strategies currently in place at home.  Determined need for follow-up on vascular neurology referral that was previously placed but not completed.  Assessed need for home health services, particularly physical therapy, to improve strength and mobility.    SHORTNESS OF BREATH:  - Monitored patient's dyspnea on minimal exertion.    Problem List Items Addressed This Visit       Other hyperlipidemia    Relevant Orders    Lipid Panel  HYPERLIPIDEMIA:  - Advised patient to fast before upcoming labs to ensure accurate lipid profile assessment.    Benign essential HTN   HYPERTENSION MANAGEMENT:  - Monitored antihypertensive regimen, which includes a combination pill taken twice daily.  - Blood pressure was notably low during visit, necessitating a recheck.  - Explained importance of monitoring BP at home, especially before taking medication and when experiencing symptoms.  - Advised to hold antihypertensive medication if blood pressure is too low and to recheck after several hours.    Other insomnia    Relevant Medications    zolpidem (AMBIEN) 10 mg Tab  INSOMNIA MANAGEMENT:  - Refilled Ambien prescription at [facility] for sleep management.    Type 2 diabetes mellitus with hyperglycemia, without long-term current use of insulin  TYPE 2 DIABETES MANAGEMENT:  - Monitored Trulicity regimen.  - Last A1C result was 5.6 from November with no testing since then.  - Ordered new A1C test to assess current glycemic control.      Loss of appetite     Relevant Medications    cyproheptadine (PERIACTIN) 4 mg tablet  WEIGHT LOSS MANAGEMENT:  - Monitored patient's significant recent weight loss.  - Discussed importance of maintaining muscle mass through proper nutrition and physical activity.  - Recommend eating small, frequent meals throughout the day.  - Prescribed Periactin for appetite stimulation.    Ischemic stroke  SEQUELAE OF CEREBRAL INFARCTION (POST-STROKE SYMPTOMS):  - Monitored the patient's post-stroke symptoms including lethargy, episodes of syncope, and mobility difficulties, particularly left-sided weakness.  - Evaluated complaints of dizziness and unsteadiness.  - Educated patient on benefits of physical therapy and home health services for improving strength and mobility post-stroke.  - Referred to vascular neurology for follow-up assessment of post-stroke recovery.    GAIT AND MOBILITY ABNORMALITIES:  - Evaluated patient's unsteadiness and imbalance, especially during positional changes from sitting to standing or while walking.  - Recommend increasing physical activity throughout the day, including standing exercises and using pedal bike to improve leg strength.  - Referred to home health for potential physical therapy, which will also help prepare for increased activity during upcoming cruise.    FALL PREVENTION:  - Monitored patient's history of falls, particularly during episodes of unsteadiness.  - Advised using supportive devices (e.g., stool in hallway) for prevention.  - Provided information on home safety measures and recommended wearing supportive shoes with good arch support and comfortable toe box.     Other Visit Diagnoses         Encounter for screening mammogram for breast cancer      Patient is scheduled currently today.  Number provided for patient to reschedule      Age-related osteoporosis without current pathological fracture    Patient is scheduled currently today.  Number provided for patient to reschedule        History of  "bone density study      Patient is scheduled currently today.  Number provided for patient to reschedule     Hospital follow up- Primary  GENERAL HEALTH MAINTENANCE:  - Recommend drinking 70-80 oz of water daily.  - Ordered mammogram and bone density (DEXA) scan.    FOLLOW-UP AND APPOINTMENTS:  - Follow up with Dr. Ocasio on June 20th.  - Contact office for information about home health services scheduled for June 30th.  - Schedule mammogram and DEXA scan through patient portal.  - Complete ordered lab work on June 12th prior to Dr. Ocasio appointment.          --------------------------------------------      Health Maintenance:  Health Maintenance         Date Due Completion Date    DEXA Scan Never done ---    Colorectal Cancer Screening Never done ---    RSV Vaccine (Age 60+ and Pregnant patients) (1 - Risk 60-74 years 1-dose series) Never done ---    Mammogram 07/11/2024 7/11/2023    Foot Exam 11/14/2024 11/14/2023    Hemoglobin A1c 05/15/2025 11/15/2024    Diabetic Eye Exam 05/17/2025 5/17/2024    Diabetes Urine Screening 11/11/2025 11/11/2024    Lipid Panel 11/15/2025 11/15/2024    High Dose Statin 05/23/2026 5/23/2025    TETANUS VACCINE 05/11/2033 5/11/2023            Health maintenance reviewed and Advised patient on the importance of completing overdue health maintenance items    Follow Up:  Follow up if symptoms worsen or fail to improve.    Exam     Review of Systems:  (as noted above)  Review of Systems    Physical Exam:   Physical Exam  Vitals:    05/23/25 1317   BP: (!) 82/40   BP Location: Left arm   Patient Position: Sitting   Pulse: 73   Resp: 16   Temp: 99.5 °F (37.5 °C)   TempSrc: Oral   SpO2: 97%   Weight: 66 kg (145 lb 8.1 oz)   Height: 5' 5" (1.651 m)      Body mass index is 24.21 kg/m².    Physical Exam    General: No acute distress. Well-developed. Well-nourished.  Eyes: EOMI. Sclerae anicteric.  HENT: Normocephalic. Atraumatic. Nares patent.   Cardiovascular: Regular rate. Regular rhythm. No " murmurs. No rubs. No gallops. Normal S1, S2.  Respiratory: Normal respiratory effort. Clear to auscultation bilaterally. No rales. No rhonchi. No wheezing.  Musculoskeletal: No  obvious deformity.  Extremities: No lower extremity edema.  Neurological: Alert & oriented x3. No slurred speech. Normal gait.  Psychiatric: Normal mood. Normal affect. Good insight. Good judgment.  Skin: Warm. Dry. No rash.           History     Past Medical History:  Past Medical History:   Diagnosis Date    Acute diastolic congestive heart failure 2024    Diabetes mellitus     Hypertension     Hypertensive emergency 2024       Past Surgical History:  Past Surgical History:   Procedure Laterality Date     SECTION      ENDOMETRIAL ABLATION      OVARIAN CYST REMOVAL         Social History:  Social History[1]    Family History:  Family History   Problem Relation Name Age of Onset    Alcohol abuse Father      Miscarriages / Stillbirths Mother      Hypertension Mother      Cancer Mother      Arthritis Mother      Pancreatic cancer Mother  83    Early death Brother      Early death Brother      Miscarriages / Stillbirths Sister      Asthma Sister      Asthma Daughter      Colon cancer Neg Hx      Diabetes Neg Hx      Stroke Neg Hx         Allergies and Medications: (updated and reviewed)  Review of patient's allergies indicates:   Allergen Reactions    Sulfa (sulfonamide antibiotics) Itching    Cephalexin Nausea And Vomiting     Current Medications[2]    Patient Care Team:  Tricia Ocasio MD as PCP - General (Internal Medicine)  Lauren Awad MD as Obstetrician (Obstetrics)  Alejandra Mcgrath MA as Care Coordinator         - The patient is given an After Visit Summary that lists all medications with directions, allergies, education, orders placed during this encounter and follow-up instructions.      - I have reviewed the patient's medical information including past medical, family, and social history sections  including the medications and allergies.      - We discussed the patient's current medications.     This note was created by combination of typed  and MModal dictation.  Transcription errors may be present.  If there are any questions, please contact me.     This note was generated with the assistance of ambient listening technology. Verbal consent was obtained by the patient and accompanying visitor(s) for the recording of patient appointment to facilitate this note. I attest to having reviewed and edited the generated note for accuracy, though some syntax or spelling errors may persist. Please contact the author of this note for any clarification.          Elvira Finney PA-C                      [1]   Social History  Socioeconomic History    Marital status:     Number of children: 1   Tobacco Use    Smoking status: Never     Passive exposure: Never    Smokeless tobacco: Never   Substance and Sexual Activity    Alcohol use: Yes     Alcohol/week: 1.0 standard drink of alcohol     Types: 1 Glasses of wine per week     Comment: socially    Drug use: Never    Sexual activity: Not Currently     Partners: Male     Comment:  since 2020 after 38 years marriage     Social Drivers of Health     Financial Resource Strain: Low Risk  (1/28/2025)    Overall Financial Resource Strain (CARDIA)     Difficulty of Paying Living Expenses: Not hard at all   Food Insecurity: No Food Insecurity (1/28/2025)    Hunger Vital Sign     Worried About Running Out of Food in the Last Year: Never true     Ran Out of Food in the Last Year: Never true   Transportation Needs: No Transportation Needs (11/11/2023)    PRAPARE - Transportation     Lack of Transportation (Medical): No     Lack of Transportation (Non-Medical): No   Physical Activity: Inactive (1/28/2025)    Exercise Vital Sign     Days of Exercise per Week: 0 days     Minutes of Exercise per Session: 0 min   Stress: Stress Concern Present (1/28/2025)     [facility] - Occupational Stress Questionnaire     Feeling of Stress : Very much   Housing Stability: Unknown (1/28/2025)    Housing Stability Vital Sign     Unable to Pay for Housing in the Last Year: No   [2]   Current Outpatient Medications   Medication Sig Dispense Refill    amlodipine-olmesartan (AMBER) 10-40 mg per tablet Take 1 tablet by mouth once daily. 90 tablet 3    aspirin (ECOTRIN) 81 MG EC tablet Take 1 tablet (81 mg total) by mouth once daily. 30 tablet 2    atorvastatin (LIPITOR) 40 MG tablet Take 1 tablet (40 mg total) by mouth once daily. 90 tablet 3    carvediloL (COREG) 25 MG tablet Take 1 tablet (25 mg total) by mouth 2 (two) times daily with meals. 180 tablet 3    furosemide (LASIX) 40 MG tablet Take 40 mg by mouth 2 (two) times daily.      mirtazapine (REMERON) 7.5 MG Tab Take 1 tablet (7.5 mg total) by mouth every evening. 90 tablet 0    sertraline (ZOLOFT) 50 MG tablet Take 1 tablet (50 mg total) by mouth once daily. 90 tablet 3    TRULICITY 0.75 mg/0.5 mL pen injector Inject 0.75 mg into the skin every 7 days.      cyproheptadine (PERIACTIN) 4 mg tablet Take 1 tablet (4 mg total) by mouth 3 (three) times daily as needed (Increase appetite). 30 tablet 0    zolpidem (AMBIEN) 10 mg Tab Take 1 tablet (10 mg total) by mouth nightly as needed (insomnia). Ambien 10 mg tablet  Take 1 tablet every day by oral route. 30 tablet 0     No current facility-administered medications for this visit.

## 2025-05-23 NOTE — PROGRESS NOTES
Health Maintenance Due   Topic     DEXA Scan      Colorectal Cancer Screening  Consult with PCP    RSV Vaccine (Age 60+ and Pregnant patients) (1 - Risk 60-74 years 1-dose series) Not given at this facility       Mammogram      Foot Exam  Consult with PCP    Hemoglobin A1c      Diabetic Eye Exam  Consult with PCP

## 2025-05-28 DIAGNOSIS — R63.0 LOSS OF APPETITE: ICD-10-CM

## 2025-05-28 DIAGNOSIS — F33.1 MODERATE EPISODE OF RECURRENT MAJOR DEPRESSIVE DISORDER: ICD-10-CM

## 2025-05-28 RX ORDER — MIRTAZAPINE 7.5 MG/1
7.5 TABLET, FILM COATED ORAL NIGHTLY
Qty: 90 TABLET | Refills: 2 | Status: SHIPPED | OUTPATIENT
Start: 2025-05-28

## 2025-05-28 NOTE — TELEPHONE ENCOUNTER
No care due was identified.  [facility] Embedded Care Due Messages. Reference number: [id number].   5/28/2025 8:59:21 AM CDT

## 2025-05-28 NOTE — TELEPHONE ENCOUNTER
Refill Routing Note   Medication(s) are not appropriate for processing by [facility] for the following reason(s):        Drug-disease interaction    [facility] action(s):  Defer      Medication Therapy Plan: Drug-Disease: mirtazapine and Ischemic stroke    Pharmacist review requested: Yes     Appointments  past 12m or future 3m with PCP    Date Provider   Last Visit   3/19/2025 Tricia Ocasio MD   Next Visit   6/20/2025 Tricia Ocasio MD   ED visits in past 90 days: 0        Note composed:10:48 AM 05/28/2025

## 2025-05-28 NOTE — TELEPHONE ENCOUNTER
Refill Decision Note   Machelle Morin  is requesting a refill authorization.  Brief Assessment and Rationale for Refill:  Approve     Medication Therapy Plan:         Pharmacist review requested: Yes   Extended chart review required: Yes   Comments:     Note composed:12:32 PM 05/28/2025

## 2025-06-10 ENCOUNTER — LAB VISIT (OUTPATIENT)
Dept: LAB | Facility: HOSPITAL | Age: 65
End: 2025-06-10
Attending: INTERNAL MEDICINE
Payer: MEDICARE

## 2025-06-10 DIAGNOSIS — E11.65 TYPE 2 DIABETES MELLITUS WITH HYPERGLYCEMIA, WITHOUT LONG-TERM CURRENT USE OF INSULIN: ICD-10-CM

## 2025-06-10 DIAGNOSIS — N17.9 AKI (ACUTE KIDNEY INJURY): ICD-10-CM

## 2025-06-10 DIAGNOSIS — E78.49 OTHER HYPERLIPIDEMIA: ICD-10-CM

## 2025-06-10 LAB
ABSOLUTE EOSINOPHIL (OHS): 0.56 K/UL
ABSOLUTE MONOCYTE (OHS): 0.26 K/UL (ref 0.3–1)
ABSOLUTE NEUTROPHIL COUNT (OHS): 3.56 K/UL (ref 1.8–7.7)
ALBUMIN SERPL BCP-MCNC: 3.3 G/DL (ref 3.5–5.2)
ALP SERPL-CCNC: 46 UNIT/L (ref 40–150)
ALT SERPL W/O P-5'-P-CCNC: 19 UNIT/L (ref 10–44)
ANION GAP (OHS): 4 MMOL/L (ref 8–16)
AST SERPL-CCNC: 24 UNIT/L (ref 11–45)
BASOPHILS # BLD AUTO: 0.03 K/UL
BASOPHILS NFR BLD AUTO: 0.5 %
BILIRUB SERPL-MCNC: 0.3 MG/DL (ref 0.1–1)
BUN SERPL-MCNC: 75 MG/DL (ref 8–23)
CALCIUM SERPL-MCNC: 8.3 MG/DL (ref 8.7–10.5)
CHLORIDE SERPL-SCNC: 113 MMOL/L (ref 95–110)
CHOLEST SERPL-MCNC: 165 MG/DL (ref 120–199)
CHOLEST/HDLC SERPL: 2.8 {RATIO} (ref 2–5)
CO2 SERPL-SCNC: 20 MMOL/L (ref 23–29)
CREAT SERPL-MCNC: 2.3 MG/DL (ref 0.5–1.4)
EAG (OHS): 148 MG/DL (ref 68–131)
ERYTHROCYTE [DISTWIDTH] IN BLOOD BY AUTOMATED COUNT: 13.6 % (ref 11.5–14.5)
GFR SERPLBLD CREATININE-BSD FMLA CKD-EPI: 23 ML/MIN/1.73/M2
GLUCOSE SERPL-MCNC: 121 MG/DL (ref 70–110)
HBA1C MFR BLD: 6.8 % (ref 4–5.6)
HCT VFR BLD AUTO: 28.2 % (ref 37–48.5)
HDLC SERPL-MCNC: 60 MG/DL (ref 40–75)
HDLC SERPL: 36.4 % (ref 20–50)
HGB BLD-MCNC: 8.5 GM/DL (ref 12–16)
IMM GRANULOCYTES # BLD AUTO: 0.01 K/UL (ref 0–0.04)
IMM GRANULOCYTES NFR BLD AUTO: 0.2 % (ref 0–0.5)
LDLC SERPL CALC-MCNC: 95 MG/DL (ref 63–159)
LYMPHOCYTES # BLD AUTO: 1.58 K/UL (ref 1–4.8)
MCH RBC QN AUTO: 30.2 PG (ref 27–31)
MCHC RBC AUTO-ENTMCNC: 30.1 G/DL (ref 32–36)
MCV RBC AUTO: 100 FL (ref 82–98)
NONHDLC SERPL-MCNC: 105 MG/DL
NUCLEATED RBC (/100WBC) (OHS): 0 /100 WBC
PLATELET # BLD AUTO: 114 K/UL (ref 150–450)
PMV BLD AUTO: 12.7 FL (ref 9.2–12.9)
POTASSIUM SERPL-SCNC: 6.2 MMOL/L (ref 3.5–5.1)
PROT SERPL-MCNC: 6.5 GM/DL (ref 6–8.4)
RBC # BLD AUTO: 2.81 M/UL (ref 4–5.4)
RELATIVE EOSINOPHIL (OHS): 9.3 %
RELATIVE LYMPHOCYTE (OHS): 26.3 % (ref 18–48)
RELATIVE MONOCYTE (OHS): 4.3 % (ref 4–15)
RELATIVE NEUTROPHIL (OHS): 59.4 % (ref 38–73)
SODIUM SERPL-SCNC: 137 MMOL/L (ref 136–145)
TRIGL SERPL-MCNC: 50 MG/DL (ref 30–150)
WBC # BLD AUTO: 6 K/UL (ref 3.9–12.7)

## 2025-06-10 PROCEDURE — 36415 COLL VENOUS BLD VENIPUNCTURE: CPT | Mod: PO

## 2025-06-10 PROCEDURE — 80061 LIPID PANEL: CPT

## 2025-06-10 PROCEDURE — 82040 ASSAY OF SERUM ALBUMIN: CPT

## 2025-06-10 PROCEDURE — 85025 COMPLETE CBC W/AUTO DIFF WBC: CPT

## 2025-06-10 PROCEDURE — 83036 HEMOGLOBIN GLYCOSYLATED A1C: CPT

## 2025-06-11 ENCOUNTER — RESULTS FOLLOW-UP (OUTPATIENT)
Dept: FAMILY MEDICINE | Facility: CLINIC | Age: 65
End: 2025-06-11

## 2025-06-19 ENCOUNTER — TELEPHONE (OUTPATIENT)
Dept: FAMILY MEDICINE | Facility: CLINIC | Age: 65
End: 2025-06-19
Payer: MEDICARE

## 2025-06-19 NOTE — TELEPHONE ENCOUNTER
Call returned to patient's sister, no voicemail setup. Pt medications will be filled during tomorrow's office visit.

## 2025-06-19 NOTE — TELEPHONE ENCOUNTER
Copied from CRM #[id number]. Topic: Medications - Medication Refill  >> Jun 19, 2025  1:48 PM Mae wrote:  Type: RX Refill Request    Who Called: pt's sister    Have you contacted your pharmacy: yes     Refill or New Rx:refill     RX Name and Strength: furosemide (LASIX) 40 MG tablet    How is the patient currently taking it? (ex. 1XDay):    Is this a 30 day or 90 day RX:    Preferred Pharmacy with phone number:   [id number] - [place] - [address] AT [facility] [address]  [address] [place] [address]  Phone: [phone] Fax: [phone]      Local or Mail Order:local     Ordering Provider:    Would the patient rather a call back or a response via [facility]? Call back     Best Call Back Number:[phone]      Additional Information:

## 2025-06-20 ENCOUNTER — OFFICE VISIT (OUTPATIENT)
Dept: FAMILY MEDICINE | Facility: CLINIC | Age: 65
End: 2025-06-20
Payer: MEDICARE

## 2025-06-20 ENCOUNTER — RESULTS FOLLOW-UP (OUTPATIENT)
Dept: FAMILY MEDICINE | Facility: CLINIC | Age: 65
End: 2025-06-20

## 2025-06-20 ENCOUNTER — LAB VISIT (OUTPATIENT)
Dept: LAB | Facility: HOSPITAL | Age: 65
End: 2025-06-20
Attending: INTERNAL MEDICINE
Payer: MEDICARE

## 2025-06-20 VITALS
DIASTOLIC BLOOD PRESSURE: 90 MMHG | HEART RATE: 77 BPM | OXYGEN SATURATION: 98 % | SYSTOLIC BLOOD PRESSURE: 160 MMHG | TEMPERATURE: 98 F | HEIGHT: 65 IN | BODY MASS INDEX: 27.63 KG/M2 | WEIGHT: 165.81 LBS | RESPIRATION RATE: 18 BRPM

## 2025-06-20 DIAGNOSIS — Z12.31 ENCOUNTER FOR SCREENING MAMMOGRAM FOR BREAST CANCER: ICD-10-CM

## 2025-06-20 DIAGNOSIS — I10 BENIGN ESSENTIAL HTN: ICD-10-CM

## 2025-06-20 DIAGNOSIS — N18.4 ANEMIA IN STAGE 4 CHRONIC KIDNEY DISEASE: ICD-10-CM

## 2025-06-20 DIAGNOSIS — N18.4 CKD (CHRONIC KIDNEY DISEASE) STAGE 4, GFR 15-29 ML/MIN: ICD-10-CM

## 2025-06-20 DIAGNOSIS — E87.5 HYPERKALEMIA: Primary | ICD-10-CM

## 2025-06-20 DIAGNOSIS — E11.65 TYPE 2 DIABETES MELLITUS WITH HYPERGLYCEMIA, WITHOUT LONG-TERM CURRENT USE OF INSULIN: Primary | ICD-10-CM

## 2025-06-20 DIAGNOSIS — E87.5 HYPERKALEMIA: ICD-10-CM

## 2025-06-20 DIAGNOSIS — R63.0 LOSS OF APPETITE: ICD-10-CM

## 2025-06-20 DIAGNOSIS — E11.65 TYPE 2 DIABETES MELLITUS WITH HYPERGLYCEMIA, WITHOUT LONG-TERM CURRENT USE OF INSULIN: ICD-10-CM

## 2025-06-20 DIAGNOSIS — D63.1 ANEMIA IN STAGE 4 CHRONIC KIDNEY DISEASE: ICD-10-CM

## 2025-06-20 DIAGNOSIS — Z78.0 POST-MENOPAUSAL: ICD-10-CM

## 2025-06-20 DIAGNOSIS — M79.89 LEG SWELLING: ICD-10-CM

## 2025-06-20 PROBLEM — E87.6 HYPOKALEMIA: Status: RESOLVED | Noted: 2024-04-17 | Resolved: 2025-06-20

## 2025-06-20 PROBLEM — Z09 FOLLOW-UP EXAM: Status: RESOLVED | Noted: 2024-12-09 | Resolved: 2025-06-20

## 2025-06-20 PROBLEM — R61 NIGHT SWEATS: Status: RESOLVED | Noted: 2025-02-19 | Resolved: 2025-06-20

## 2025-06-20 LAB
ANION GAP (OHS): 5 MMOL/L (ref 8–16)
BUN SERPL-MCNC: 48 MG/DL (ref 8–23)
CALCIUM SERPL-MCNC: 8.9 MG/DL (ref 8.7–10.5)
CHLORIDE SERPL-SCNC: 112 MMOL/L (ref 95–110)
CO2 SERPL-SCNC: 20 MMOL/L (ref 23–29)
CREAT SERPL-MCNC: 1.7 MG/DL (ref 0.5–1.4)
GFR SERPLBLD CREATININE-BSD FMLA CKD-EPI: 33 ML/MIN/1.73/M2
GLUCOSE SERPL-MCNC: 125 MG/DL (ref 70–110)
POTASSIUM SERPL-SCNC: 6.2 MMOL/L (ref 3.5–5.1)
SODIUM SERPL-SCNC: 137 MMOL/L (ref 136–145)

## 2025-06-20 PROCEDURE — 99999 PR PBB SHADOW E&M-EST. PATIENT-LVL V: CPT | Mod: PBBFAC,,, | Performed by: INTERNAL MEDICINE

## 2025-06-20 PROCEDURE — 80048 BASIC METABOLIC PNL TOTAL CA: CPT

## 2025-06-20 PROCEDURE — 36415 COLL VENOUS BLD VENIPUNCTURE: CPT | Mod: PO

## 2025-06-20 RX ORDER — FUROSEMIDE 20 MG/1
20 TABLET ORAL 2 TIMES DAILY PRN
Qty: 90 TABLET | Refills: 0 | Status: SHIPPED | OUTPATIENT
Start: 2025-06-20

## 2025-06-20 NOTE — ASSESSMENT & PLAN NOTE
- Noted improvement in leg swelling with increased activity.  - Decreased Furosemide dose, to be taken only if leg swelling occurs.  - Instructed patient to monitor for leg swelling and take Furosemide at the lower prescribed dose if fluid retention is noticed.  - Machelle to continue current walking regimen for exercise.

## 2025-06-20 NOTE — ASSESSMENT & PLAN NOTE
- Evaluated renal function and determined need for nephrology referral as kidney function continues to decline, though not yet near dialysis stage.  - Noted kidney function was already declining prior to current visit.  - Ordered CMP to reassess renal function and potassium levels.  - Educated patient on the connection between hypertension, diabetes, and CKD.

## 2025-06-20 NOTE — PROGRESS NOTES
Health Maintenance Due   Topic     DEXA Scan      Colorectal Cancer Screening  Consult PCP    RSV Vaccine (Age 60+ and Pregnant patients) (1 - Risk 60-74 years 1-dose series) Not offered at this clinic    Mammogram      Foot Exam  Consult PCP

## 2025-06-20 NOTE — PROGRESS NOTES
Chief Complaint: Follow-up (3 month follow up /Discuss medications Trulicity and Lasix) and Medication Refill      Machelle Morin  is a 65 y.o. year old patient who presents today for     History of Present Illness    CHIEF COMPLAINT:  Machelle presents today for follow up    CHRONIC KIDNEY DISEASE:  She has a history of diabetes and hypertension which have potentially impacted kidney function. She is not currently at a stage requiring dialysis.    DIABETES:  Her A1C has increased from 5.6 to 6.8, indicating worsening glycemic control. She previously discontinued Trulicity due to uncontrollable diarrhea.    MEDICATIONS:  She takes amlodipine and carvedilol at night for blood pressure management. Morning medications include aspirin 81mg and a multivitamin. She uses Lasix (furosemide) as needed for leg swelling when she notices fluid retention. She is currently taking cyproheptadine twice daily and reports associated weight gain.    EXERCISE:  She has initiated a morning walking routine outdoors despite the heat. Her walking frequency is limited due to anxiety about neighborhood dogs, specifically pitbulls, when passing certain houses. She remains motivated to stay active, understanding the importance of movement and resistance exercise.    LABS:  Recent labs showed elevated potassium levels requiring urgent follow-up and repeat testing. Her anemia has progressed with levels lower than previous baseline, attributed to reduced erythropoietin production. She denies any symptoms related to these laboratory abnormalities.      ROS:  General: -fever, -chills, -fatigue, -weight gain, -weight loss, +cold intolerance  Eyes: -vision changes, -redness, -discharge  ENT: -ear pain, -nasal congestion, -sore throat  Cardiovascular: -chest pain, -palpitations, -lower extremity edema  Respiratory: -cough, -shortness of breath  Gastrointestinal: -abdominal pain, -nausea, -vomiting, -diarrhea, -constipation, -blood in  stool  Genitourinary: -dysuria, -hematuria, -frequency  Musculoskeletal: -joint pain, -muscle pain  Skin: -rash, -lesion  Neurological: -headache, -dizziness, -numbness, -tingling  Psychiatric: -anxiety, -depression, -sleep difficulty         Past Medical History:   Diagnosis Date    Acute diastolic congestive heart failure 2024    Diabetes mellitus     Hypertension     Hypertensive emergency 2024       Past Surgical History:   Procedure Laterality Date     SECTION      ENDOMETRIAL ABLATION      OVARIAN CYST REMOVAL          Family History   Problem Relation Name Age of Onset    Alcohol abuse Father      Miscarriages / Stillbirths Mother      Hypertension Mother      Cancer Mother      Arthritis Mother      Pancreatic cancer Mother  83    Early death Brother      Early death Brother      Miscarriages / Stillbirths Sister      Asthma Sister      Asthma Daughter      Colon cancer Neg Hx      Diabetes Neg Hx      Stroke Neg Hx          Social History     Socioeconomic History    Marital status:     Number of children: 1   Tobacco Use    Smoking status: Never     Passive exposure: Never    Smokeless tobacco: Never   Substance and Sexual Activity    Alcohol use: Yes     Alcohol/week: 1.0 standard drink of alcohol     Types: 1 Glasses of wine per week     Comment: socially    Drug use: Never    Sexual activity: Not Currently     Partners: Male     Comment:  since 2020 after 38 years marriage     Social Drivers of Health     Financial Resource Strain: Low Risk  (2025)    Overall Financial Resource Strain (CARDIA)     Difficulty of Paying Living Expenses: Not hard at all   Food Insecurity: No Food Insecurity (2025)    Hunger Vital Sign     Worried About Running Out of Food in the Last Year: Never true     Ran Out of Food in the Last Year: Never true   Transportation Needs: No Transportation Needs (2023)    PRAPARE - Transportation     Lack of Transportation (Medical): No      Lack of Transportation (Non-Medical): No   Physical Activity: Inactive (1/28/2025)    Exercise Vital Sign     Days of Exercise per Week: 0 days     Minutes of Exercise per Session: 0 min   Stress: Stress Concern Present (1/28/2025)    [employer] - Occupational Stress Questionnaire     Feeling of Stress : Very much   Housing Stability: Unknown (1/28/2025)    Housing Stability Vital Sign     Unable to Pay for Housing in the Last Year: No       Current Medications[1]           Objective:      Vitals:    06/20/25 1105   BP: (!) 160/90   Pulse:    Resp:    Temp:        Physical Exam  Constitutional:       Appearance: Normal appearance.   HENT:      Head: Normocephalic and atraumatic.   Skin:     General: Skin is warm and dry.   Neurological:      General: No focal deficit present.      Mental Status: She is alert and oriented to person, place, and time.   Psychiatric:         Mood and Affect: Mood normal.         Behavior: Behavior normal.          Assessment:       1. Type 2 diabetes mellitus with hyperglycemia, without long-term current use of insulin    2. Post-menopausal    3. Encounter for screening mammogram for breast cancer    4. Hyperkalemia    5. Leg swelling    6. CKD (chronic kidney disease) stage 4, GFR 15-29 ml/min    7. Benign essential HTN    8. Anemia in stage 4 chronic kidney disease    9. Loss of appetite          Plan:   1. Type 2 diabetes mellitus with hyperglycemia, without long-term current use of insulin  Assessment & Plan:  - Assessed weight gain and A1C increase from 5.6 to 6.8.  - Discontinued Trulicity due to significant GI side effects, but considering restarting at lower dose (2.5 mg).  - Considering SGLT2 inhibitor (Jardiance) as potential treatment for both diabetes and CKD, pending confirmation of renal function safety.  - Informed patient about potential side effects of Jardiance, including rare UTIs.  - Ordered A1C test to be completed before next follow-up  visit in 3 months to reassess diabetes management.      2. Post-menopausal  -     DXA Bone Density Axial Skeleton 1 or more sites; Future; Expected date: 06/20/2025    3. Encounter for screening mammogram for breast cancer  -     Mammo Digital Screening Bilat w/ Jesus (XPD); Future; Expected date: 06/20/2025    4. Hyperkalemia  Assessment & Plan:  - Noted elevated potassium levels requiring close monitoring, which previously prompted a hospital call.  - Ordered CMP to reassess potassium levels.  - Will send message through patient portal by end of day with further instructions based on today's lab results.    Orders:  -     Basic Metabolic Panel; Future; Expected date: 06/20/2025    5. Leg swelling  Assessment & Plan:  - Noted improvement in leg swelling with increased activity.  - Decreased Furosemide dose, to be taken only if leg swelling occurs.  - Instructed patient to monitor for leg swelling and take Furosemide at the lower prescribed dose if fluid retention is noticed.  - Machelle to continue current walking regimen for exercise.     Orders:  -     furosemide (LASIX) 20 MG tablet; Take 1 tablet (20 mg total) by mouth 2 (two) times daily as needed (leg swelling).  Dispense: 90 tablet; Refill: 0    6. CKD (chronic kidney disease) stage 4, GFR 15-29 ml/min  Assessment & Plan:  - Evaluated renal function and determined need for nephrology referral as kidney function continues to decline, though not yet near dialysis stage.  - Noted kidney function was already declining prior to current visit.  - Ordered CMP to reassess renal function and potassium levels.  - Educated patient on the connection between hypertension, diabetes, and CKD.    Orders:  -     Ambulatory referral/consult to Nephrology; Future; Expected date: 06/27/2025    7. Benign essential HTN  Assessment & Plan:  - Assessed blood pressure management, noting high readings today despite previous low measurements.  - Continued Abbi daily at night and  Carvedilol, with flexibility to take once or twice daily based on patient preference.  - Instructed patient to continue checking and recording blood pressure at home.  - Scheduled follow up in 2-3 weeks for blood pressure check.      8. Anemia in stage 4 chronic kidney disease  Assessment & Plan:  - Noted anemia is worsening slightly.  - Explained the relationship between CKD and anemia, specifically how it affects erythropoietin production and blood levels.  - Ordered CBC to reassess hemoglobin levels.  - Determined no need for blood transfusion currently, but will continue to monitor.      9. Loss of appetite  Assessment & Plan:  Weight gain of 20lbs on remeron and periactin   Cont for now   Counseled on avoiding too much weight gain               Follow up in about 3 months (around 9/20/2025).    This note was generated with the assistance of ambient listening technology. Verbal consent was obtained by the patient and accompanying visitor(s) for the recording of patient appointment to facilitate this note. I attest to having reviewed and edited the generated note for accuracy, though some syntax or spelling errors may persist. Please contact the author of this note for any clarification.                [1]   Current Outpatient Medications:     amlodipine-olmesartan (AMBER) 10-40 mg per tablet, Take 1 tablet by mouth once daily., Disp: 90 tablet, Rfl: 3    aspirin (ECOTRIN) 81 MG EC tablet, Take 1 tablet (81 mg total) by mouth once daily., Disp: 30 tablet, Rfl: 2    atorvastatin (LIPITOR) 40 MG tablet, Take 1 tablet (40 mg total) by mouth once daily., Disp: 90 tablet, Rfl: 3    carvediloL (COREG) 25 MG tablet, Take 1 tablet (25 mg total) by mouth 2 (two) times daily with meals., Disp: 180 tablet, Rfl: 3    cyproheptadine (PERIACTIN) 4 mg tablet, Take 1 tablet (4 mg total) by mouth 3 (three) times daily as needed (Increase appetite)., Disp: 30 tablet, Rfl: 0    mirtazapine (REMERON) 7.5 MG Tab, Take 1 tablet (7.5 mg  total) by mouth every evening., Disp: 90 tablet, Rfl: 2    sertraline (ZOLOFT) 50 MG tablet, Take 1 tablet (50 mg total) by mouth once daily., Disp: 90 tablet, Rfl: 3    zolpidem (AMBIEN) 10 mg Tab, Take 1 tablet (10 mg total) by mouth nightly as needed (insomnia). Ambien 10 mg tablet  Take 1 tablet every day by oral route., Disp: 30 tablet, Rfl: 0    furosemide (LASIX) 20 MG tablet, Take 1 tablet (20 mg total) by mouth 2 (two) times daily as needed (leg swelling)., Disp: 90 tablet, Rfl: 0

## 2025-06-20 NOTE — ASSESSMENT & PLAN NOTE
Weight gain of 20lbs on remeron and periactin   Cont for now   Counseled on avoiding too much weight gain

## 2025-06-20 NOTE — ASSESSMENT & PLAN NOTE
- Noted anemia is worsening slightly.  - Explained the relationship between CKD and anemia, specifically how it affects erythropoietin production and blood levels.  - Ordered CBC to reassess hemoglobin levels.  - Determined no need for blood transfusion currently, but will continue to monitor.

## 2025-06-20 NOTE — ASSESSMENT & PLAN NOTE
- Noted elevated potassium levels requiring close monitoring, which previously prompted a hospital call.  - Ordered CMP to reassess potassium levels.  - Will send message through patient portal by end of day with further instructions based on today's lab results.

## 2025-06-20 NOTE — ASSESSMENT & PLAN NOTE
- Assessed weight gain and A1C increase from 5.6 to 6.8.  - Discontinued Trulicity due to significant GI side effects, but considering restarting at lower dose (2.5 mg).  - Considering SGLT2 inhibitor (Jardiance) as potential treatment for both diabetes and CKD, pending confirmation of renal function safety.  - Informed patient about potential side effects of Jardiance, including rare UTIs.  - Ordered A1C test to be completed before next follow-up visit in 3 months to reassess diabetes management.

## 2025-06-20 NOTE — ASSESSMENT & PLAN NOTE
- Assessed blood pressure management, noting high readings today despite previous low measurements.  - Continued Abbi daily at night and Carvedilol, with flexibility to take once or twice daily based on patient preference.  - Instructed patient to continue checking and recording blood pressure at home.  - Scheduled follow up in 2-3 weeks for blood pressure check.

## 2025-06-23 ENCOUNTER — TELEPHONE (OUTPATIENT)
Dept: ADMINISTRATIVE | Facility: CLINIC | Age: 65
End: 2025-06-23
Payer: MEDICARE

## 2025-06-23 NOTE — TELEPHONE ENCOUNTER
Called pt; informed pt's daughter I was calling to cancel pt's eawv appt that is scheduled for 6/30/25 due to pt's insurance being peoples health secure; pt's daughter confirmed pt does have [employer] health secure; pt's daughter verbalized understanding and informed someone from [facility] would reach out to pt to get appt scheduled; appt canceled

## 2025-06-26 ENCOUNTER — TELEPHONE (OUTPATIENT)
Dept: FAMILY MEDICINE | Facility: CLINIC | Age: 65
End: 2025-06-26
Payer: MEDICARE

## 2025-06-26 NOTE — TELEPHONE ENCOUNTER
Spoke to daughter who states she didn't call and doesn't have any questions; states her mom gets confused and probably called the office but she will explain everything to her        Copied from CRM #[id number]. Topic: Medications - Medication Question  >> Jun 26, 2025 11:20 AM oY wrote:  .Type: Patient Call Back    Who called:self     What is the request in detail: pt has questions about her medication changes     Can the clinic reply by [facility]? Call manohar    Would the patient rather a call back or a response via [facility]? Call back     Best call back number:.[phone]      Additional Information:

## 2025-07-18 ENCOUNTER — OFFICE VISIT (OUTPATIENT)
Dept: FAMILY MEDICINE | Facility: CLINIC | Age: 65
End: 2025-07-18
Payer: MEDICARE

## 2025-07-18 VITALS
HEART RATE: 79 BPM | BODY MASS INDEX: 26.52 KG/M2 | DIASTOLIC BLOOD PRESSURE: 80 MMHG | OXYGEN SATURATION: 98 % | RESPIRATION RATE: 18 BRPM | HEIGHT: 65 IN | WEIGHT: 159.19 LBS | TEMPERATURE: 100 F | SYSTOLIC BLOOD PRESSURE: 144 MMHG

## 2025-07-18 DIAGNOSIS — J02.9 ACUTE PHARYNGITIS, UNSPECIFIED ETIOLOGY: Primary | ICD-10-CM

## 2025-07-18 DIAGNOSIS — R09.81 SINUS CONGESTION: ICD-10-CM

## 2025-07-18 LAB
CTP QC/QA: YES
S PYO RRNA THROAT QL PROBE: NEGATIVE

## 2025-07-18 PROCEDURE — 99999 PR PBB SHADOW E&M-EST. PATIENT-LVL IV: CPT | Mod: PBBFAC,,,

## 2025-07-18 RX ORDER — TRIAMCINOLONE ACETONIDE 40 MG/ML
40 INJECTION, SUSPENSION INTRA-ARTICULAR; INTRAMUSCULAR
Status: COMPLETED | OUTPATIENT
Start: 2025-07-18 | End: 2025-07-18

## 2025-07-18 RX ADMIN — TRIAMCINOLONE ACETONIDE 40 MG: 40 INJECTION, SUSPENSION INTRA-ARTICULAR; INTRAMUSCULAR at 09:07

## 2025-07-18 NOTE — PROGRESS NOTES
Family Medicine     Patient name: Machelle Morin  MRN: [id number]  : 1960  PCP NAME: Tricia Ocasio MD    Subjective     History of Present Illness:  Patient ID: Machelle Morin is a 65 y.o. Black or  female presents to the clinic today. Chronic medical issues, if present, have been documented.   Active Problem List with Overview Notes    Diagnosis Date Noted    CKD (chronic kidney disease) stage 4, GFR 15-29 ml/min 2025    Hyperkalemia 2025    Anemia in stage 4 chronic kidney disease 2025    History of bone density study 2025    Age-related osteoporosis without current pathological fracture 2025    Encounter for screening mammogram for breast cancer 2025    Ischemic stroke 2025    Loss of appetite 2025    Type 2 diabetes mellitus with hyperglycemia, without long-term current use of insulin 2024    Mild cognitive impairment 2024    Thrombocytopenia, unspecified 2024    Nephrotic range proteinuria 2024    Moderate episode of recurrent major depressive disorder 02/15/2024    Leg swelling 2024    Benign essential HTN 2023    Other insomnia 2023    Other hyperlipidemia 2023       Chief Complaint  Chief Complaint   Patient presents with    Otalgia    Cough    Fever    Sore Throat       Presents with 1 day history of sore throat.  Her daughter, who accompanies her to this visit recently recovered from an episode of pharyngitis.  There is no fever.  Currently using NSAIDs.  Wants a steroid injection.      Medications   Medication List with Changes/Refills   Current Medications    AMLODIPINE-OLMESARTAN (AMBER) 10-40 MG PER TABLET    Take 1 tablet by mouth once daily.    ASPIRIN (ECOTRIN) 81 MG EC TABLET    Take 1 tablet (81 mg total) by mouth once daily.    ATORVASTATIN (LIPITOR) 40 MG TABLET    Take 1 tablet (40 mg total) by mouth once daily.    CARVEDILOL (COREG) 25 MG TABLET    Take 1  tablet (25 mg total) by mouth 2 (two) times daily with meals.    CYPROHEPTADINE (PERIACTIN) 4 MG TABLET    Take 1 tablet (4 mg total) by mouth 3 (three) times daily as needed (Increase appetite).    EMPAGLIFLOZIN (JARDIANCE) 25 MG TABLET    Take 1 tablet (25 mg total) by mouth once daily.    FUROSEMIDE (LASIX) 20 MG TABLET    Take 1 tablet (20 mg total) by mouth 2 (two) times daily as needed (leg swelling).    MIRTAZAPINE (REMERON) 7.5 MG TAB    Take 1 tablet (7.5 mg total) by mouth every evening.    SERTRALINE (ZOLOFT) 50 MG TABLET    Take 1 tablet (50 mg total) by mouth once daily.    ZOLPIDEM (AMBIEN) 10 MG TAB    Take 1 tablet (10 mg total) by mouth nightly as needed (insomnia). Ambien 10 mg tablet  Take 1 tablet every day by oral route.       Allergies  Review of patient's allergies indicates:   Allergen Reactions    Sulfa (sulfonamide antibiotics) Itching    Cephalexin Nausea And Vomiting     Past Medical history  Past Medical History:   Diagnosis Date    Acute diastolic congestive heart failure 2024    Diabetes mellitus     Hypertension     Hypertensive emergency 2024          Surgical History  Past Surgical History:   Procedure Laterality Date     SECTION      ENDOMETRIAL ABLATION      OVARIAN CYST REMOVAL       Family History  Family History   Problem Relation Name Age of Onset    Alcohol abuse Father      Miscarriages / Stillbirths Mother      Hypertension Mother      Cancer Mother      Arthritis Mother      Pancreatic cancer Mother  83    Early death Brother      Early death Brother      Miscarriages / Stillbirths Sister      Asthma Sister      Asthma Daughter      Colon cancer Neg Hx      Diabetes Neg Hx      Stroke Neg Hx       Social History  Social History[1]       Review of system     ROS  Negative except as mentioned above  Physical exam   Vital Signs  Vitals:    25 0856   BP: (!) 144/80   Pulse: 79   Resp: 18   Temp: 100.2 °F (37.9 °C)   TempSrc: Oral   SpO2: 98%   Weight:  "72.2 kg (159 lb 2.8 oz)   Height: 5' 5" (1.651 m)       Physical Exam  Constitutional:       Appearance: Normal appearance.   Cardiovascular:      Rate and Rhythm: Normal rate and regular rhythm.      Pulses: Normal pulses.      Heart sounds: Normal heart sounds.   Pulmonary:      Effort: Pulmonary effort is normal. No respiratory distress.      Breath sounds: Normal breath sounds. No wheezing.   Abdominal:      General: Bowel sounds are normal. There is no distension.      Palpations: Abdomen is soft.      Tenderness: There is no abdominal tenderness.   Musculoskeletal:      Right lower leg: No edema.      Left lower leg: No edema.   Skin:     General: Skin is warm.   Neurological:      Mental Status: She is alert and oriented to person, place, and time.           Wt Readings from Last 3 Encounters:   07/18/25 72.2 kg (159 lb 2.8 oz)   06/20/25 75.2 kg (165 lb 12.6 oz)   05/23/25 66 kg (145 lb 8.1 oz)          Body mass index is 26.49 kg/m².      Laboratory data and other diagnostic findings     Lab Results   Component Value Date    WBC 6.00 06/10/2025    HGB 8.5 (L) 06/10/2025    HCT 28.2 (L) 06/10/2025     (H) 06/10/2025     (L) 06/10/2025         Lab Results   Component Value Date    CREATININE 1.7 (H) 06/20/2025    BUN 48 (H) 06/20/2025     06/20/2025    K 6.2 (H) 06/20/2025     (H) 06/20/2025    CO2 20 (L) 06/20/2025         Assessment and plan     Problem List Items Addressed This Visit    None  Visit Diagnoses         Acute pharyngitis, unspecified etiology    -  Primary    Relevant Medications    triamcinolone acetonide injection 40 mg (Completed)    Other Relevant Orders    POCT Rapid Strep A (Completed)      Sinus congestion        Relevant Medications    triamcinolone acetonide injection 40 mg (Completed)            Acute pharyngitis, unspecified etiology  Strep a is negative.  Likely viral.  Continue conservative management and ensure hydration.  Steroid injection today.  -     " triamcinolone acetonide injection 40 mg  -     POCT Rapid Strep A    Sinus congestion  -     triamcinolone acetonide injection 40 mg                 Medications Administered this visit  Administrations This Visit       triamcinolone acetonide injection 40 mg       Admin Date  07/18/2025 Action  Given Dose  40 mg Route  Intramuscular Documented By  Renee Beryr LPN                     Future Appointments  Future Appointments   Date Time Provider Department Center   8/8/2025  8:20 AM [facility]1 [facility] [place]   8/8/2025  9:30 AM [facility] DEXA1 [facility] [place]   9/23/2025  8:40 AM Tricia Ocasio MD [facility] [place]         No follow-ups on file. and PRN.      Susi Baer MD    This office note was created by combination of typed  and MModal dictation.  Transcription errors may be present.  If there are any questions, please contact me.               [1]   Social History  Tobacco Use    Smoking status: Never     Passive exposure: Never    Smokeless tobacco: Never   Substance Use Topics    Alcohol use: Yes     Alcohol/week: 1.0 standard drink of alcohol     Types: 1 Glasses of wine per week     Comment: socially    Drug use: Never

## 2025-07-28 ENCOUNTER — PATIENT OUTREACH (OUTPATIENT)
Dept: ADMINISTRATIVE | Facility: HOSPITAL | Age: 65
End: 2025-07-28
Payer: MEDICARE

## 2025-07-28 ENCOUNTER — TELEPHONE (OUTPATIENT)
Dept: ADMINISTRATIVE | Facility: HOSPITAL | Age: 65
End: 2025-07-28
Payer: MEDICARE

## 2025-07-28 NOTE — TELEPHONE ENCOUNTER
Spoke with daughter notified to  mail  back cologuard. Daughter will call back or send through portal of remote blood pressure.

## 2025-07-28 NOTE — TELEPHONE ENCOUNTER
----- Message from Tricia Ocasio MD sent at 6/20/2025  5:08 PM CDT -----  F/up on BP and colonoscopy. Thanks!

## 2025-07-28 NOTE — PROGRESS NOTES
HM and immunization's reviewed and updated.  Remind to mail back cologuard if need replacement need to call [phone].   Dexa and mammogram scheduled for 8/8/2025.    Spoke with daughter notified of mailing back cologuard. Daughter will call back or send through portal of remote blood pressure.